# Patient Record
Sex: MALE | Race: WHITE | NOT HISPANIC OR LATINO | URBAN - METROPOLITAN AREA
[De-identification: names, ages, dates, MRNs, and addresses within clinical notes are randomized per-mention and may not be internally consistent; named-entity substitution may affect disease eponyms.]

---

## 2017-04-06 PROBLEM — Z00.00 ENCOUNTER FOR PREVENTIVE HEALTH EXAMINATION: Status: ACTIVE | Noted: 2017-04-06

## 2017-05-16 ENCOUNTER — EMERGENCY (EMERGENCY)
Facility: HOSPITAL | Age: 52
LOS: 0 days | Discharge: HOME | End: 2017-05-16
Attending: EMERGENCY MEDICINE | Admitting: EMERGENCY MEDICINE
Payer: MEDICARE

## 2017-05-16 VITALS
HEART RATE: 74 BPM | TEMPERATURE: 99 F | OXYGEN SATURATION: 100 % | DIASTOLIC BLOOD PRESSURE: 90 MMHG | RESPIRATION RATE: 18 BRPM | SYSTOLIC BLOOD PRESSURE: 130 MMHG

## 2017-05-16 VITALS — WEIGHT: 160.06 LBS | HEIGHT: 63 IN

## 2017-05-16 DIAGNOSIS — R22.1 LOCALIZED SWELLING, MASS AND LUMP, NECK: ICD-10-CM

## 2017-05-16 DIAGNOSIS — Q82.2 CONGENITAL CUTANEOUS MASTOCYTOSIS: ICD-10-CM

## 2017-05-16 DIAGNOSIS — R06.02 SHORTNESS OF BREATH: ICD-10-CM

## 2017-05-16 LAB
ALBUMIN SERPL ELPH-MCNC: 4.4 G/DL — SIGNIFICANT CHANGE UP (ref 3.3–5)
ALP SERPL-CCNC: 68 U/L — SIGNIFICANT CHANGE UP (ref 40–120)
ALT FLD-CCNC: 26 U/L — SIGNIFICANT CHANGE UP (ref 12–78)
ANION GAP SERPL CALC-SCNC: 9 MMOL/L — SIGNIFICANT CHANGE UP (ref 5–17)
APPEARANCE UR: CLEAR — SIGNIFICANT CHANGE UP
AST SERPL-CCNC: 22 U/L — SIGNIFICANT CHANGE UP (ref 15–37)
BACTERIA # UR AUTO: (no result)
BASOPHILS # BLD AUTO: 0.2 K/UL — SIGNIFICANT CHANGE UP (ref 0–0.2)
BASOPHILS NFR BLD AUTO: 1.7 % — SIGNIFICANT CHANGE UP (ref 0–2)
BILIRUB SERPL-MCNC: 0.3 MG/DL — SIGNIFICANT CHANGE UP (ref 0.2–1.2)
BILIRUB UR-MCNC: NEGATIVE — SIGNIFICANT CHANGE UP
BUN SERPL-MCNC: 11 MG/DL — SIGNIFICANT CHANGE UP (ref 7–23)
CALCIUM SERPL-MCNC: 9.3 MG/DL — SIGNIFICANT CHANGE UP (ref 8.5–10.1)
CHLORIDE SERPL-SCNC: 105 MMOL/L — SIGNIFICANT CHANGE UP (ref 96–108)
CO2 SERPL-SCNC: 26 MMOL/L — SIGNIFICANT CHANGE UP (ref 22–31)
COLOR SPEC: YELLOW — SIGNIFICANT CHANGE UP
CREAT SERPL-MCNC: 0.9 MG/DL — SIGNIFICANT CHANGE UP (ref 0.5–1.3)
DIFF PNL FLD: (no result)
EOSINOPHIL # BLD AUTO: 0.3 K/UL — SIGNIFICANT CHANGE UP (ref 0–0.5)
EOSINOPHIL NFR BLD AUTO: 3.2 % — SIGNIFICANT CHANGE UP (ref 0–6)
GLUCOSE SERPL-MCNC: 79 MG/DL — SIGNIFICANT CHANGE UP (ref 70–99)
GLUCOSE UR QL: NEGATIVE MG/DL — SIGNIFICANT CHANGE UP
HCT VFR BLD CALC: 41.3 % — SIGNIFICANT CHANGE UP (ref 39–50)
HGB BLD-MCNC: 14.1 G/DL — SIGNIFICANT CHANGE UP (ref 13–17)
KETONES UR-MCNC: NEGATIVE — SIGNIFICANT CHANGE UP
LEUKOCYTE ESTERASE UR-ACNC: (no result)
LYMPHOCYTES # BLD AUTO: 1.3 K/UL — SIGNIFICANT CHANGE UP (ref 1–3.3)
LYMPHOCYTES # BLD AUTO: 14 % — SIGNIFICANT CHANGE UP (ref 13–44)
MCHC RBC-ENTMCNC: 27.8 PG — SIGNIFICANT CHANGE UP (ref 27–34)
MCHC RBC-ENTMCNC: 34.1 GM/DL — SIGNIFICANT CHANGE UP (ref 32–36)
MCV RBC AUTO: 81.6 FL — SIGNIFICANT CHANGE UP (ref 80–100)
MONOCYTES # BLD AUTO: 0.6 K/UL — SIGNIFICANT CHANGE UP (ref 0–0.9)
MONOCYTES NFR BLD AUTO: 6.5 % — SIGNIFICANT CHANGE UP (ref 2–14)
NEUTROPHILS # BLD AUTO: 6.8 K/UL — SIGNIFICANT CHANGE UP (ref 1.8–7.4)
NEUTROPHILS NFR BLD AUTO: 74.6 % — SIGNIFICANT CHANGE UP (ref 43–77)
NITRITE UR-MCNC: NEGATIVE — SIGNIFICANT CHANGE UP
NT-PROBNP SERPL-SCNC: 35 PG/ML — SIGNIFICANT CHANGE UP (ref 0–125)
PH UR: 7 — SIGNIFICANT CHANGE UP (ref 5–8)
PLATELET # BLD AUTO: 284 K/UL — SIGNIFICANT CHANGE UP (ref 150–400)
POTASSIUM SERPL-MCNC: 4.2 MMOL/L — SIGNIFICANT CHANGE UP (ref 3.5–5.3)
POTASSIUM SERPL-SCNC: 4.2 MMOL/L — SIGNIFICANT CHANGE UP (ref 3.5–5.3)
PROT SERPL-MCNC: 7.8 GM/DL — SIGNIFICANT CHANGE UP (ref 6–8.3)
PROT UR-MCNC: NEGATIVE MG/DL — SIGNIFICANT CHANGE UP
RBC # BLD: 5.07 M/UL — SIGNIFICANT CHANGE UP (ref 4.2–5.8)
RBC # FLD: 11.8 % — SIGNIFICANT CHANGE UP (ref 10.3–14.5)
RBC CASTS # UR COMP ASSIST: (no result) /HPF (ref 0–4)
SODIUM SERPL-SCNC: 140 MMOL/L — SIGNIFICANT CHANGE UP (ref 135–145)
SP GR SPEC: 1.01 — SIGNIFICANT CHANGE UP (ref 1.01–1.02)
UROBILINOGEN FLD QL: NEGATIVE MG/DL — SIGNIFICANT CHANGE UP
WBC # BLD: 9.1 K/UL — SIGNIFICANT CHANGE UP (ref 3.8–10.5)
WBC # FLD AUTO: 9.1 K/UL — SIGNIFICANT CHANGE UP (ref 3.8–10.5)
WBC UR QL: SIGNIFICANT CHANGE UP

## 2017-05-16 PROCEDURE — 93010 ELECTROCARDIOGRAM REPORT: CPT

## 2017-05-16 PROCEDURE — 99284 EMERGENCY DEPT VISIT MOD MDM: CPT

## 2017-05-16 RX ORDER — FAMOTIDINE 10 MG/ML
1 INJECTION INTRAVENOUS
Qty: 10 | Refills: 0
Start: 2017-05-16 | End: 2017-05-21

## 2017-05-16 RX ORDER — FAMOTIDINE 10 MG/ML
20 INJECTION INTRAVENOUS ONCE
Qty: 0 | Refills: 0 | Status: COMPLETED | OUTPATIENT
Start: 2017-05-16 | End: 2017-05-16

## 2017-05-16 RX ORDER — DIPHENHYDRAMINE HCL 50 MG
1 CAPSULE ORAL
Qty: 21 | Refills: 0
Start: 2017-05-16 | End: 2017-05-23

## 2017-05-16 RX ORDER — DIPHENHYDRAMINE HCL 50 MG
50 CAPSULE ORAL ONCE
Qty: 0 | Refills: 0 | Status: COMPLETED | OUTPATIENT
Start: 2017-05-16 | End: 2017-05-16

## 2017-05-16 RX ADMIN — FAMOTIDINE 20 MILLIGRAM(S): 10 INJECTION INTRAVENOUS at 17:23

## 2017-05-16 RX ADMIN — Medication 50 MILLIGRAM(S): at 17:02

## 2017-05-16 RX ADMIN — Medication 125 MILLIGRAM(S): at 17:02

## 2017-05-16 NOTE — ED STATDOCS - CARE PLAN
Principal Discharge DX:	Neck swelling  Secondary Diagnosis:	Shortness of breath  Secondary Diagnosis:	Mastocytosis

## 2017-05-16 NOTE — ED STATDOCS - OBJECTIVE STATEMENT
50 yo M h/o mastocytosis presents for SOB x 2 days. Pt states his throat is closing. +Chest congestion, coughing up fluid in his lungs. Afebrile. He went to urgent care today and was prescribed a medication that didn't help. Pt uses epipen injections for emergencies, last used 1.5 years ago. PCP Dr. Pennington. No recent travel.

## 2017-05-16 NOTE — ED STATDOCS - ATTENDING CONTRIBUTION TO CARE
I, Jose Eastman MD,  performed the initial face to face bedside interview with this patient regarding history of present illness, review of symptoms and relevant past medical, social and family history.  I completed an independent physical examination.  I was the initial provider who evaluated this patient. I have signed out the follow up of any pending tests (i.e. labs, radiological studies) to the ACP.  I have communicated the patient’s plan of care and disposition with the ACP.  The history, relevant review of systems, past medical and surgical history, medical decision making, and physical examination was documented by the scribe in my presence and I attest to the accuracy of the documentation.

## 2017-05-16 NOTE — ED STATDOCS - PROGRESS NOTE DETAILS
52 yo male with a PMH of mastocytosis presents with sob x 2 days. States he gets this intermittent episodes that causes him to have fluid buildup in the lungs, coughing up fluid and swelling of his neck but this time it lasted longer than normal. Took benadryl and claritan without relief. Denies cp, aback pain, n/v/d, f/c/sweating. Will give meds, IVF and reeval -Noble moreno PA-C Reevaluated patient at bedside.  Patient feeling much improved and swelling has improved.  Discussed the results in ED and copies of all reports given.   An opportunity to ask questions was given.  Discussed the importance of prompt, close medical follow-up.  Patient will return with any changes, concerns or persistent / worsening symptoms.  Understanding of all instructions verbalized. Reevaluated patient at bedside.  Patient feeling much improved and swelling has improved. Wants to go home. Resting comfortably on RW chairs. Discussed the results in ED and copies of all reports given.   An opportunity to ask questions was given.  Discussed the importance of prompt, close medical follow-up.  Patient will return with any changes, concerns or persistent / worsening symptoms.  Understanding of all instructions verbalized.

## 2017-05-17 LAB
CULTURE RESULTS: NO GROWTH — SIGNIFICANT CHANGE UP
SPECIMEN SOURCE: SIGNIFICANT CHANGE UP

## 2017-10-28 ENCOUNTER — EMERGENCY (EMERGENCY)
Facility: HOSPITAL | Age: 52
LOS: 0 days | Discharge: HOME | End: 2017-10-28
Attending: EMERGENCY MEDICINE | Admitting: EMERGENCY MEDICINE
Payer: MEDICARE

## 2017-10-28 VITALS
SYSTOLIC BLOOD PRESSURE: 131 MMHG | DIASTOLIC BLOOD PRESSURE: 75 MMHG | HEART RATE: 84 BPM | TEMPERATURE: 98 F | OXYGEN SATURATION: 100 % | RESPIRATION RATE: 17 BRPM

## 2017-10-28 VITALS — WEIGHT: 179.9 LBS | HEIGHT: 65 IN

## 2017-10-28 DIAGNOSIS — R22.0 LOCALIZED SWELLING, MASS AND LUMP, HEAD: ICD-10-CM
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
## 2017-10-28 DIAGNOSIS — T78.3XXA ANGIONEUROTIC EDEMA, INITIAL ENCOUNTER: ICD-10-CM
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
## 2017-10-28 PROCEDURE — 93010 ELECTROCARDIOGRAM REPORT: CPT

## 2017-10-28 PROCEDURE — 99285 EMERGENCY DEPT VISIT HI MDM: CPT

## 2017-10-28 RX ORDER — SODIUM CHLORIDE 9 MG/ML
1000 INJECTION INTRAMUSCULAR; INTRAVENOUS; SUBCUTANEOUS ONCE
Qty: 0 | Refills: 0 | Status: COMPLETED | OUTPATIENT
Start: 2017-10-28 | End: 2017-10-28

## 2017-10-28 RX ORDER — FAMOTIDINE 10 MG/ML
20 INJECTION INTRAVENOUS ONCE
Qty: 0 | Refills: 0 | Status: COMPLETED | OUTPATIENT
Start: 2017-10-28 | End: 2017-10-28

## 2017-10-28 RX ORDER — ACETAMINOPHEN 500 MG
650 TABLET ORAL ONCE
Qty: 0 | Refills: 0 | Status: COMPLETED | OUTPATIENT
Start: 2017-10-28 | End: 2017-10-28

## 2017-10-28 RX ORDER — HYDROXYZINE HCL 10 MG
1 TABLET ORAL
Qty: 9 | Refills: 0
Start: 2017-10-28 | End: 2017-10-31

## 2017-10-28 RX ORDER — EPINEPHRINE 0.3 MG/.3ML
0.3 INJECTION INTRAMUSCULAR; SUBCUTANEOUS ONCE
Qty: 0 | Refills: 0 | Status: COMPLETED | OUTPATIENT
Start: 2017-10-28 | End: 2017-10-28

## 2017-10-28 RX ADMIN — Medication 125 MILLIGRAM(S): at 17:32

## 2017-10-28 RX ADMIN — FAMOTIDINE 20 MILLIGRAM(S): 10 INJECTION INTRAVENOUS at 17:32

## 2017-10-28 RX ADMIN — SODIUM CHLORIDE 1000 MILLILITER(S): 9 INJECTION INTRAMUSCULAR; INTRAVENOUS; SUBCUTANEOUS at 17:30

## 2017-10-28 RX ADMIN — EPINEPHRINE 0.3 MILLIGRAM(S): 0.3 INJECTION INTRAMUSCULAR; SUBCUTANEOUS at 17:32

## 2017-10-28 RX ADMIN — Medication 650 MILLIGRAM(S): at 18:51

## 2017-10-28 NOTE — ED PROVIDER NOTE - PROGRESS NOTE DETAILS
Reassessment shows resolution of post laryngeal swelling will send on steroids and Atarax follow up with his PCP. Reassessment shows resolution of post laryngeal swelling will send on steroids and Atarax follow up with his PCP.  Pt reports no further symptoms.

## 2017-10-28 NOTE — ED PROVIDER NOTE - MUSCULOSKELETAL, MLM
Spine appears normal, range of motion is not limited, no muscle or joint tenderness +left posterior pharynx swelling Spine appears normal, range of motion is not limited, no muscle or joint tenderness

## 2017-10-28 NOTE — ED PROVIDER NOTE - OBJECTIVE STATEMENT
53 y/o male hx of mastocytosis, allergy to horse hair presents to the ED c/o sudden onset of left posterior pharynx swelling about half hour ago, took ibuprofen, took Benadryl. Pt had cheese and lemonade before coming to the ED.    Xanax yesterday-anxiety   nonsmoker.   Denies any new medications. Does not see an Allergist.

## 2017-10-28 NOTE — ED ADULT NURSE REASSESSMENT NOTE - NS ED NURSE REASSESS COMMENT FT1
Care transferred from Mignon Escudero RN to myself. Pt resting comfortable, airway patent, normal breathing pattern with no difficulty. Skin is smooth, dry and intact. Pt states "my throat feels scratchy", speech is clear. AO x 3 oriented to baseline.  at the bedside, DC pending

## 2017-10-28 NOTE — ED PROVIDER NOTE - ENMT, MLM
Airway patent, Nasal mucosa clear. Mouth with normal mucosa. Throat has no vesicles, no oropharyngeal exudates and uvula is midline. Airway patent, Nasal mucosa clear. Mouth with normal mucosa. Throat has no vesicles, no oropharyngeal exudates and uvula is midline.  + left posterior pharyngeal swelling. No drooling or stridor. No tongue or lip swelling.

## 2017-10-28 NOTE — ED PROVIDER NOTE - NS ED SCRIBE STATEMENT
The writer sent a web page information to the [facility] re new sanguineous drain from pharyngostomy to low intermittent suctioning. The patient is refusing irrigation. The patient is concerned and has turned off the intermittent suctioning. The MD will see the patient.    Attending

## 2018-05-18 NOTE — ED STATDOCS - NS ED MD SCRIBE ATTENDING SCRIBE SECTIONS
17-May-2018 23:33
RESULTS/HISTORY OF PRESENT ILLNESS/PAST MEDICAL/SURGICAL/SOCIAL HISTORY/REVIEW OF SYSTEMS/PROGRESS NOTE/PHYSICAL EXAM/DISPOSITION

## 2019-10-12 ENCOUNTER — EMERGENCY (EMERGENCY)
Facility: HOSPITAL | Age: 54
LOS: 0 days | Discharge: HOME | End: 2019-10-12
Attending: EMERGENCY MEDICINE
Payer: MEDICARE

## 2019-10-12 VITALS
OXYGEN SATURATION: 100 % | SYSTOLIC BLOOD PRESSURE: 139 MMHG | RESPIRATION RATE: 18 BRPM | DIASTOLIC BLOOD PRESSURE: 91 MMHG | TEMPERATURE: 98 F | HEART RATE: 82 BPM

## 2019-10-12 VITALS — HEIGHT: 65 IN | WEIGHT: 169.98 LBS

## 2019-10-12 DIAGNOSIS — L03.116 CELLULITIS OF LEFT LOWER LIMB: ICD-10-CM

## 2019-10-12 DIAGNOSIS — L73.9 FOLLICULAR DISORDER, UNSPECIFIED: ICD-10-CM

## 2019-10-12 PROCEDURE — 99283 EMERGENCY DEPT VISIT LOW MDM: CPT

## 2019-10-12 PROCEDURE — 96372 THER/PROPH/DIAG INJ SC/IM: CPT

## 2019-10-12 PROCEDURE — 99283 EMERGENCY DEPT VISIT LOW MDM: CPT | Mod: 25

## 2019-10-12 RX ORDER — CEFTRIAXONE 500 MG/1
250 INJECTION, POWDER, FOR SOLUTION INTRAMUSCULAR; INTRAVENOUS ONCE
Refills: 0 | Status: COMPLETED | OUTPATIENT
Start: 2019-10-12 | End: 2019-10-12

## 2019-10-12 RX ADMIN — CEFTRIAXONE 250 MILLIGRAM(S): 500 INJECTION, POWDER, FOR SOLUTION INTRAMUSCULAR; INTRAVENOUS at 20:08

## 2019-10-12 NOTE — ED ADULT TRIAGE NOTE - CHIEF COMPLAINT QUOTE
Infection in left knee for 3 days. Went to urgent care and was given antibiotic with no improvement. symptoms worsened. patient was given a different antibiotic and had adverse reaction. swelling has worsened. unsure of the source of infection.

## 2019-10-12 NOTE — ED ADULT NURSE NOTE - NSIMPLEMENTINTERV_GEN_ALL_ED
Implemented All Universal Safety Interventions:  [place] to call system. Call bell, personal items and telephone within reach. Instruct patient to call for assistance. Room bathroom lighting operational. Non-slip footwear when patient is off stretcher. Physically safe environment: no spills, clutter or unnecessary equipment. Stretcher in lowest position, wheels locked, appropriate side rails in place.

## 2019-10-12 NOTE — ED STATDOCS - SKIN, MLM
skin normal color for race, warm, dry and intact. skin normal color for race, warm, dry and intact. left knee over tibial tubercle with tiny pustule without significant surrounding erythema, no erudition, no fluctuance, no streaking

## 2019-10-12 NOTE — ED STATDOCS - CLINICAL SUMMARY MEDICAL DECISION MAKING FREE TEXT BOX
PT with mild LE pustule, superficial infection, threw up doxy given by .  worried infection is spreading, however mild here to me today.  pt and family requesting "injection of antibiotics" to ensure it does not get worse.  will give ceftriaxone 250mg IM and should cont doxy and follow up with PMD.  return precautions discussed at length.

## 2019-10-12 NOTE — ED STATDOCS - PATIENT PORTAL LINK FT
You can access the FollowMyHealth Patient Portal offered by [facility] by registering at the following website: [email or web address]. By joining [facility]’s FollowMyHealth portal, you will also be able to view your health information using other applications (apps) compatible with our system.

## 2019-10-12 NOTE — ED STATDOCS - CARE PLAN
Principal Discharge DX:	Folliculitis Principal Discharge DX:	Folliculitis  Secondary Diagnosis:	Cellulitis of left lower extremity

## 2019-10-12 NOTE — ED ADULT NURSE NOTE - OBJECTIVE STATEMENT
53 y/o M presents c/o left knee pain. Pt treated outpatient with antibiotics. Pt returned for increased discomfort.

## 2019-10-13 PROBLEM — Q82.2 CONGENITAL CUTANEOUS MASTOCYTOSIS: Chronic | Status: ACTIVE | Noted: 2017-05-17

## 2021-09-18 ENCOUNTER — INPATIENT (INPATIENT)
Facility: HOSPITAL | Age: 56
LOS: 12 days | Discharge: HOME | DRG: 280 | End: 2021-10-01
Attending: FAMILY MEDICINE | Admitting: STUDENT IN AN ORGANIZED HEALTH CARE EDUCATION/TRAINING PROGRAM
Payer: MEDICARE

## 2021-09-18 VITALS
WEIGHT: 160.06 LBS | DIASTOLIC BLOOD PRESSURE: 48 MMHG | TEMPERATURE: 98 F | RESPIRATION RATE: 17 BRPM | HEIGHT: 65 IN | OXYGEN SATURATION: 97 % | HEART RATE: 61 BPM | SYSTOLIC BLOOD PRESSURE: 109 MMHG

## 2021-09-18 DIAGNOSIS — K85.20 ALCOHOL INDUCED ACUTE PANCREATITIS WITHOUT NECROSIS OR INFECTION: ICD-10-CM

## 2021-09-18 LAB
ADD ON TEST-SPECIMEN IN LAB: SIGNIFICANT CHANGE UP
ADD ON TEST-SPECIMEN IN LAB: SIGNIFICANT CHANGE UP
ALBUMIN SERPL ELPH-MCNC: 3.7 G/DL — SIGNIFICANT CHANGE UP (ref 3.3–5)
ALP SERPL-CCNC: 104 U/L — SIGNIFICANT CHANGE UP (ref 40–120)
ALT FLD-CCNC: 42 U/L — SIGNIFICANT CHANGE UP (ref 12–78)
ANION GAP SERPL CALC-SCNC: 10 MMOL/L — SIGNIFICANT CHANGE UP (ref 5–17)
APTT BLD: 29.1 SEC — SIGNIFICANT CHANGE UP (ref 27.5–35.5)
AST SERPL-CCNC: 60 U/L — HIGH (ref 15–37)
BASOPHILS # BLD AUTO: 0.02 K/UL — SIGNIFICANT CHANGE UP (ref 0–0.2)
BASOPHILS NFR BLD AUTO: 0.2 % — SIGNIFICANT CHANGE UP (ref 0–2)
BILIRUB SERPL-MCNC: 0.5 MG/DL — SIGNIFICANT CHANGE UP (ref 0.2–1.2)
BUN SERPL-MCNC: 8 MG/DL — SIGNIFICANT CHANGE UP (ref 7–23)
CALCIUM SERPL-MCNC: 8.8 MG/DL — SIGNIFICANT CHANGE UP (ref 8.5–10.1)
CHLORIDE SERPL-SCNC: 97 MMOL/L — SIGNIFICANT CHANGE UP (ref 96–108)
CO2 SERPL-SCNC: 24 MMOL/L — SIGNIFICANT CHANGE UP (ref 22–31)
CREAT SERPL-MCNC: 0.74 MG/DL — SIGNIFICANT CHANGE UP (ref 0.5–1.3)
EOSINOPHIL # BLD AUTO: 0 K/UL — SIGNIFICANT CHANGE UP (ref 0–0.5)
EOSINOPHIL NFR BLD AUTO: 0 % — SIGNIFICANT CHANGE UP (ref 0–6)
ETHANOL SERPL-MCNC: <10 MG/DL — SIGNIFICANT CHANGE UP (ref 0–10)
GLUCOSE SERPL-MCNC: 111 MG/DL — HIGH (ref 70–99)
HCT VFR BLD CALC: 40.2 % — SIGNIFICANT CHANGE UP (ref 39–50)
HGB BLD-MCNC: 13.5 G/DL — SIGNIFICANT CHANGE UP (ref 13–17)
IMM GRANULOCYTES NFR BLD AUTO: 0.5 % — SIGNIFICANT CHANGE UP (ref 0–1.5)
INR BLD: 1.14 RATIO — SIGNIFICANT CHANGE UP (ref 0.88–1.16)
LIDOCAIN IGE QN: 3319 U/L — HIGH (ref 73–393)
LYMPHOCYTES # BLD AUTO: 0.45 K/UL — LOW (ref 1–3.3)
LYMPHOCYTES # BLD AUTO: 5.4 % — LOW (ref 13–44)
MCHC RBC-ENTMCNC: 27.3 PG — SIGNIFICANT CHANGE UP (ref 27–34)
MCHC RBC-ENTMCNC: 33.6 GM/DL — SIGNIFICANT CHANGE UP (ref 32–36)
MCV RBC AUTO: 81.2 FL — SIGNIFICANT CHANGE UP (ref 80–100)
MONOCYTES # BLD AUTO: 0.36 K/UL — SIGNIFICANT CHANGE UP (ref 0–0.9)
MONOCYTES NFR BLD AUTO: 4.3 % — SIGNIFICANT CHANGE UP (ref 2–14)
NEUTROPHILS # BLD AUTO: 7.48 K/UL — HIGH (ref 1.8–7.4)
NEUTROPHILS NFR BLD AUTO: 89.6 % — HIGH (ref 43–77)
PLATELET # BLD AUTO: 214 K/UL — SIGNIFICANT CHANGE UP (ref 150–400)
POTASSIUM SERPL-MCNC: 4.3 MMOL/L — SIGNIFICANT CHANGE UP (ref 3.5–5.3)
POTASSIUM SERPL-SCNC: 4.3 MMOL/L — SIGNIFICANT CHANGE UP (ref 3.5–5.3)
PROT SERPL-MCNC: 7.8 GM/DL — SIGNIFICANT CHANGE UP (ref 6–8.3)
PROTHROM AB SERPL-ACNC: 13.2 SEC — SIGNIFICANT CHANGE UP (ref 10.6–13.6)
RBC # BLD: 4.95 M/UL — SIGNIFICANT CHANGE UP (ref 4.2–5.8)
RBC # FLD: 15.3 % — HIGH (ref 10.3–14.5)
SARS-COV-2 RNA SPEC QL NAA+PROBE: SIGNIFICANT CHANGE UP
SODIUM SERPL-SCNC: 131 MMOL/L — LOW (ref 135–145)
TROPONIN I SERPL-MCNC: 15.4 NG/ML — HIGH (ref 0.01–0.04)
TROPONIN I SERPL-MCNC: 4.61 NG/ML — HIGH (ref 0.01–0.04)
WBC # BLD: 8.35 K/UL — SIGNIFICANT CHANGE UP (ref 3.8–10.5)
WBC # FLD AUTO: 8.35 K/UL — SIGNIFICANT CHANGE UP (ref 3.8–10.5)

## 2021-09-18 PROCEDURE — 85025 COMPLETE CBC W/AUTO DIFF WBC: CPT

## 2021-09-18 PROCEDURE — 93005 ELECTROCARDIOGRAM TRACING: CPT

## 2021-09-18 PROCEDURE — 80076 HEPATIC FUNCTION PANEL: CPT

## 2021-09-18 PROCEDURE — 86022 PLATELET ANTIBODIES: CPT

## 2021-09-18 PROCEDURE — 86769 SARS-COV-2 COVID-19 ANTIBODY: CPT

## 2021-09-18 PROCEDURE — 85027 COMPLETE CBC AUTOMATED: CPT

## 2021-09-18 PROCEDURE — 80307 DRUG TEST PRSMV CHEM ANLYZR: CPT

## 2021-09-18 PROCEDURE — U0005: CPT

## 2021-09-18 PROCEDURE — 87086 URINE CULTURE/COLONY COUNT: CPT

## 2021-09-18 PROCEDURE — 85730 THROMBOPLASTIN TIME PARTIAL: CPT

## 2021-09-18 PROCEDURE — C1887: CPT

## 2021-09-18 PROCEDURE — 81001 URINALYSIS AUTO W/SCOPE: CPT

## 2021-09-18 PROCEDURE — 82962 GLUCOSE BLOOD TEST: CPT

## 2021-09-18 PROCEDURE — 97162 PT EVAL MOD COMPLEX 30 MIN: CPT | Mod: GP

## 2021-09-18 PROCEDURE — 93306 TTE W/DOPPLER COMPLETE: CPT

## 2021-09-18 PROCEDURE — 84484 ASSAY OF TROPONIN QUANT: CPT

## 2021-09-18 PROCEDURE — 36415 COLL VENOUS BLD VENIPUNCTURE: CPT

## 2021-09-18 PROCEDURE — 84100 ASSAY OF PHOSPHORUS: CPT

## 2021-09-18 PROCEDURE — 97116 GAIT TRAINING THERAPY: CPT | Mod: GP

## 2021-09-18 PROCEDURE — 74177 CT ABD & PELVIS W/CONTRAST: CPT

## 2021-09-18 PROCEDURE — 74177 CT ABD & PELVIS W/CONTRAST: CPT | Mod: 26,ME

## 2021-09-18 PROCEDURE — 99291 CRITICAL CARE FIRST HOUR: CPT

## 2021-09-18 PROCEDURE — 99223 1ST HOSP IP/OBS HIGH 75: CPT

## 2021-09-18 PROCEDURE — 86803 HEPATITIS C AB TEST: CPT

## 2021-09-18 PROCEDURE — G1004: CPT

## 2021-09-18 PROCEDURE — 93010 ELECTROCARDIOGRAM REPORT: CPT

## 2021-09-18 PROCEDURE — U0003: CPT

## 2021-09-18 PROCEDURE — 83735 ASSAY OF MAGNESIUM: CPT

## 2021-09-18 PROCEDURE — 83690 ASSAY OF LIPASE: CPT

## 2021-09-18 PROCEDURE — 80053 COMPREHEN METABOLIC PANEL: CPT

## 2021-09-18 PROCEDURE — 83036 HEMOGLOBIN GLYCOSYLATED A1C: CPT

## 2021-09-18 PROCEDURE — 84443 ASSAY THYROID STIM HORMONE: CPT

## 2021-09-18 PROCEDURE — 80048 BASIC METABOLIC PNL TOTAL CA: CPT

## 2021-09-18 PROCEDURE — 80061 LIPID PANEL: CPT

## 2021-09-18 PROCEDURE — C1894: CPT

## 2021-09-18 PROCEDURE — 71045 X-RAY EXAM CHEST 1 VIEW: CPT | Mod: 26

## 2021-09-18 PROCEDURE — C1769: CPT

## 2021-09-18 PROCEDURE — 85049 AUTOMATED PLATELET COUNT: CPT

## 2021-09-18 PROCEDURE — 97530 THERAPEUTIC ACTIVITIES: CPT | Mod: GP

## 2021-09-18 PROCEDURE — 93454 CORONARY ARTERY ANGIO S&I: CPT

## 2021-09-18 RX ORDER — CLOPIDOGREL BISULFATE 75 MG/1
75 TABLET, FILM COATED ORAL ONCE
Refills: 0 | Status: COMPLETED | OUTPATIENT
Start: 2021-09-18 | End: 2021-09-18

## 2021-09-18 RX ORDER — CLOPIDOGREL BISULFATE 75 MG/1
300 TABLET, FILM COATED ORAL ONCE
Refills: 0 | Status: COMPLETED | OUTPATIENT
Start: 2021-09-18 | End: 2021-09-18

## 2021-09-18 RX ORDER — HEPARIN SODIUM 5000 [USP'U]/ML
INJECTION INTRAVENOUS; SUBCUTANEOUS
Qty: 25000 | Refills: 0 | Status: DISCONTINUED | OUTPATIENT
Start: 2021-09-18 | End: 2021-09-20

## 2021-09-18 RX ORDER — ASPIRIN/CALCIUM CARB/MAGNESIUM 324 MG
324 TABLET ORAL ONCE
Refills: 0 | Status: COMPLETED | OUTPATIENT
Start: 2021-09-18 | End: 2021-09-18

## 2021-09-18 RX ORDER — LISINOPRIL 2.5 MG/1
5 TABLET ORAL DAILY
Refills: 0 | Status: DISCONTINUED | OUTPATIENT
Start: 2021-09-18 | End: 2021-09-20

## 2021-09-18 RX ORDER — HEPARIN SODIUM 5000 [USP'U]/ML
4100 INJECTION INTRAVENOUS; SUBCUTANEOUS ONCE
Refills: 0 | Status: COMPLETED | OUTPATIENT
Start: 2021-09-18 | End: 2021-09-18

## 2021-09-18 RX ORDER — ATORVASTATIN CALCIUM 80 MG/1
80 TABLET, FILM COATED ORAL AT BEDTIME
Refills: 0 | Status: DISCONTINUED | OUTPATIENT
Start: 2021-09-18 | End: 2021-10-01

## 2021-09-18 RX ORDER — MORPHINE SULFATE 50 MG/1
4 CAPSULE, EXTENDED RELEASE ORAL EVERY 4 HOURS
Refills: 0 | Status: DISCONTINUED | OUTPATIENT
Start: 2021-09-18 | End: 2021-09-25

## 2021-09-18 RX ORDER — ASPIRIN/CALCIUM CARB/MAGNESIUM 324 MG
81 TABLET ORAL DAILY
Refills: 0 | Status: DISCONTINUED | OUTPATIENT
Start: 2021-09-18 | End: 2021-10-01

## 2021-09-18 RX ORDER — METOPROLOL TARTRATE 50 MG
25 TABLET ORAL
Refills: 0 | Status: DISCONTINUED | OUTPATIENT
Start: 2021-09-18 | End: 2021-09-19

## 2021-09-18 RX ORDER — FAMOTIDINE 10 MG/ML
20 INJECTION INTRAVENOUS ONCE
Refills: 0 | Status: COMPLETED | OUTPATIENT
Start: 2021-09-18 | End: 2021-09-18

## 2021-09-18 RX ORDER — CLOPIDOGREL BISULFATE 75 MG/1
75 TABLET, FILM COATED ORAL DAILY
Refills: 0 | Status: DISCONTINUED | OUTPATIENT
Start: 2021-09-19 | End: 2021-09-19

## 2021-09-18 RX ORDER — ACETAMINOPHEN 500 MG
650 TABLET ORAL EVERY 6 HOURS
Refills: 0 | Status: DISCONTINUED | OUTPATIENT
Start: 2021-09-18 | End: 2021-10-01

## 2021-09-18 RX ORDER — SODIUM CHLORIDE 9 MG/ML
1000 INJECTION INTRAMUSCULAR; INTRAVENOUS; SUBCUTANEOUS ONCE
Refills: 0 | Status: COMPLETED | OUTPATIENT
Start: 2021-09-18 | End: 2021-09-18

## 2021-09-18 RX ORDER — ONDANSETRON 8 MG/1
4 TABLET, FILM COATED ORAL ONCE
Refills: 0 | Status: COMPLETED | OUTPATIENT
Start: 2021-09-18 | End: 2021-09-18

## 2021-09-18 RX ORDER — HEPARIN SODIUM 5000 [USP'U]/ML
4100 INJECTION INTRAVENOUS; SUBCUTANEOUS EVERY 6 HOURS
Refills: 0 | Status: DISCONTINUED | OUTPATIENT
Start: 2021-09-18 | End: 2021-09-20

## 2021-09-18 RX ORDER — SODIUM CHLORIDE 9 MG/ML
1000 INJECTION INTRAMUSCULAR; INTRAVENOUS; SUBCUTANEOUS
Refills: 0 | Status: DISCONTINUED | OUTPATIENT
Start: 2021-09-18 | End: 2021-09-25

## 2021-09-18 RX ORDER — MORPHINE SULFATE 50 MG/1
4 CAPSULE, EXTENDED RELEASE ORAL ONCE
Refills: 0 | Status: DISCONTINUED | OUTPATIENT
Start: 2021-09-18 | End: 2021-09-18

## 2021-09-18 RX ORDER — FOLIC ACID 0.8 MG
1 TABLET ORAL DAILY
Refills: 0 | Status: DISCONTINUED | OUTPATIENT
Start: 2021-09-18 | End: 2021-10-01

## 2021-09-18 RX ORDER — ONDANSETRON 8 MG/1
4 TABLET, FILM COATED ORAL EVERY 6 HOURS
Refills: 0 | Status: DISCONTINUED | OUTPATIENT
Start: 2021-09-18 | End: 2021-10-01

## 2021-09-18 RX ORDER — AZELASTINE 137 UG/1
2 SPRAY, METERED NASAL
Qty: 0 | Refills: 0 | DISCHARGE

## 2021-09-18 RX ORDER — LEVOCETIRIZINE DIHYDROCHLORIDE 0.5 MG/ML
1 SOLUTION ORAL
Qty: 0 | Refills: 0 | DISCHARGE

## 2021-09-18 RX ORDER — THIAMINE MONONITRATE (VIT B1) 100 MG
100 TABLET ORAL DAILY
Refills: 0 | Status: COMPLETED | OUTPATIENT
Start: 2021-09-18 | End: 2021-09-21

## 2021-09-18 RX ADMIN — CLOPIDOGREL BISULFATE 75 MILLIGRAM(S): 75 TABLET, FILM COATED ORAL at 21:26

## 2021-09-18 RX ADMIN — MORPHINE SULFATE 4 MILLIGRAM(S): 50 CAPSULE, EXTENDED RELEASE ORAL at 18:46

## 2021-09-18 RX ADMIN — Medication 2 MILLIGRAM(S): at 21:24

## 2021-09-18 RX ADMIN — Medication 324 MILLIGRAM(S): at 21:24

## 2021-09-18 RX ADMIN — FAMOTIDINE 20 MILLIGRAM(S): 10 INJECTION INTRAVENOUS at 18:46

## 2021-09-18 RX ADMIN — ONDANSETRON 4 MILLIGRAM(S): 8 TABLET, FILM COATED ORAL at 18:46

## 2021-09-18 RX ADMIN — MORPHINE SULFATE 4 MILLIGRAM(S): 50 CAPSULE, EXTENDED RELEASE ORAL at 21:25

## 2021-09-18 RX ADMIN — SODIUM CHLORIDE 1000 MILLILITER(S): 9 INJECTION INTRAMUSCULAR; INTRAVENOUS; SUBCUTANEOUS at 18:46

## 2021-09-18 RX ADMIN — ONDANSETRON 4 MILLIGRAM(S): 8 TABLET, FILM COATED ORAL at 21:24

## 2021-09-18 RX ADMIN — HEPARIN SODIUM 800 UNIT(S)/HR: 5000 INJECTION INTRAVENOUS; SUBCUTANEOUS at 21:20

## 2021-09-18 RX ADMIN — HEPARIN SODIUM 4100 UNIT(S): 5000 INJECTION INTRAVENOUS; SUBCUTANEOUS at 21:23

## 2021-09-18 NOTE — ED PROVIDER NOTE - CARE PLAN
Principal Discharge DX:	Alcoholic pancreatitis  Secondary Diagnosis:	NSTEMI (non-ST elevation myocardial infarction)   1

## 2021-09-18 NOTE — ED ADULT NURSE NOTE - OBJECTIVE STATEMENT
Pt c/o ABD pain x3 days worsening in intensity. +NV denies diarrhea. Denies chest pain, SOB, fever/chills. HX Pancreatitis

## 2021-09-18 NOTE — ED PROVIDER NOTE - NS ED ROS FT
Review of Systems:  	•	CONSTITUTIONAL: no fever  	•	SKIN: no rash  	•	RESPIRATORY: no shortness of breath  	•	CARDIAC: no chest pain  	•	GI:  +abdominal pain, +nausea, +vomiting, +diarrhea  	•	GENITO-URINARY:  no dysuria  	•	MUSCULOSKELETAL:  no back pain  	•	NEUROLOGIC: no weakness  	•	ALLERGY: no rhinorrhea  	•	PSYSCHIATRIC: appropriate concern about symptoms

## 2021-09-18 NOTE — ED PROVIDER NOTE - NS ED MD EM SELECTION
CLINICAL PHARMACY NOTE: MEDS TO [address] Select Patient?: No  Total # of Prescriptions Filled: 2   The following medications were delivered to the patient:  · Abilify 10 mg   · Wellbutrin er xl 150 mg    Total # of Interventions Completed: 2  Time Spent (min): 30    Additional Documentation: 30677 Critical Care - 30 to 74 minutes

## 2021-09-18 NOTE — ED PROVIDER NOTE - PROGRESS NOTE DETAILS
Kiara Dean   Discussed with Dr. English Kiara LIRA for Dr. Dean   Patient's troponin is elevated, however pt without any chest pain. EKG without any acute ischemic changes. Will page cardiology: Dr. English as symptoms likely due to demand ischemia. Kiara Dean   Discussed with Dr. Lemon, who recommends starting pt on low-dose Plavix and Heparin drip. laisha: Discussed need to admit with patient & discussed risk and benefits.  Patient agreed to admission.  Discussed case w/ admitting doctor - agreed to admit to their service. will place bridge orders. Accepting physician said:   DO NOT MOVE prior to inpatient team evaluation Kiara LIRA for Dr. Dean   Patient's troponin is elevated, however pt without any chest pain. EKG without any acute ischemic changes. Will page cardiology: Dr. English as symptoms possibly due to demand ischemia vs alcohol cardiomyopathy. pt denies any cocaine use

## 2021-09-18 NOTE — ED PROVIDER NOTE - CLINICAL SUMMARY MEDICAL DECISION MAKING FREE TEXT BOX
Upper abdominal pian with nausea, vomiting. Likely due to alcoholic pancreatitis. Upper abdominal pain with nausea, vomiting. Likely due to alcoholic pancreatitis.

## 2021-09-18 NOTE — H&P ADULT - ASSESSMENT
A/P:    1.  NSTEMI  -no active chest pain now  -No ST elevation  -start on heparin drip  -started on Aspirin, Plavix, Lipitor, Lisinopril, Metoprolol  -follow troponin and follow clinically  -follow echo  -follow cardiology consult    2.  Acute Pancreatitis   -will keep on liquid diet  -on pain meds and nausea medication  -follow CMP  -follow lipid profile  -advised to quit drinking alcohol    3.  Alcohol dependence  Alcohol withdrawl  -keep on CIWA protocol  -Ativan taper  -on folic acid, MVI, Thiamine  -fall and seizure precaution    4.  Heparin for DVT ppx    5.  Code status: Patient is in full code status.  A/P:    1.  NSTEMI  -no active chest pain now  -No ST elevation  -start on heparin drip  -started on Aspirin, Plavix, Lipitor, Lisinopril  -Metoprolol will be started if the Urine dryg screen is Neg for Cocaine   -follow troponin and follow clinically  -follow echo  -follow cardiology consult    2.  Acute Pancreatitis   -will keep NPO  -on pain meds and nausea medication  -follow CMP  -follow lipid profile  -advised to quit drinking alcohol    3.  Alcohol dependence  Alcohol withdrawl  -keep on CIWA protocol  -Ativan taper  -on folic acid, MVI, Thiamine  -fall and seizure precaution    4.  Heparin for DVT ppx    5.  Code status: Patient is in full code status.

## 2021-09-18 NOTE — H&P ADULT - HISTORY OF PRESENT ILLNESS
54 yo Male presented with upper abdominal pain, gradually worsening over the past week. Patient states he has also has associated nausea, vomiting and occasional loose stools. Abdominal pain is mainly in the epigastric area, no radiation of the pain. States symptoms seem similar to prior alcoholic pancreatitis years ago. No past abdominal surgical Hx. He is a +Chronic Drinker, had 3-4 drinks today. Occasional+Marijuana use also. Denies any chest pain, sob, dizziness or headache.

## 2021-09-18 NOTE — H&P ADULT - NSHPPHYSICALEXAM_GEN_ALL_CORE
Vital Signs Last 24 Hrs  T(C): 36.7 (18 Sep 2021 21:15), Max: 36.7 (18 Sep 2021 17:53)  T(F): 98.1 (18 Sep 2021 21:15), Max: 98.1 (18 Sep 2021 17:53)  HR: 70 (18 Sep 2021 21:15) (61 - 70)  BP: 154/81 (18 Sep 2021 21:15) (109/48 - 154/81)  RR: 17 (18 Sep 2021 21:15) (17 - 17)  SpO2: 97% (18 Sep 2021 21:15) (97% - 97%)

## 2021-09-18 NOTE — ED PROVIDER NOTE - OBJECTIVE STATEMENT
Pertinent HPI/PMH/PSH/FHx/SHx and Review of Systems contained within  HPI:  Patient p/w CC abdominal pain, gradually worsening over the past week. Pt states he has also had nausea, vomiting and diarrhea. States he still feels nauseas now. Symptoms seem similar to pancreatitis from years ago. No past abdominal surgical Hx. +Smoker (cigars). +Chronic Drinker, had 2 drinks today. +Marijuana use. PCP: Dr. Iam Pennington  PMH/PSH relevant for: Mastocytosis, Pancreatitis, Depression on ant, opioid dependence on ?Suboxone  ROS negative for: fever, Chest pain, SOB, dysuria    FamilyHx and SocialHx not otherwise contributory Pertinent HPI/PMH/PSH/FHx/SHx and Review of Systems contained within  HPI:  Patient p/w CC upper abdominal pain, gradually worsening over the past week. Pt states he has also had nausea, vomiting and diarrhea. States he still feels nauseas now. States symptoms seem similar to pancreatitis from years ago. No past abdominal surgical Hx. +Smoker (cigars). +Chronic Drinker, had 2 drinks today. +Marijuana use. PCP: Dr. Iam Pennington  PMH/PSH relevant for: Mastocytosis, Pancreatitis, Depression on ant, opioid dependence on ?Suboxone  ROS negative for: fever, Chest pain, SOB, dysuria    FamilyHx and SocialHx not otherwise contributory Pertinent HPI/PMH/PSH/FHx/SHx and Review of Systems contained within  HPI: Patient p/w CC upper abdominal pain, gradually worsening over the past week. Pt states he has also has associated nausea, vomiting and diarrhea. Pt states he still feels nauseas now. States symptoms seem similar to prior alcoholic pancreatitis years ago. No past abdominal surgical Hx. +Smoker (cigars). +Chronic Drinker, had 2 drinks today. +Marijuana use. PCP: Dr. Iam Pennington. Denies Hx of cocaine use. Denies chest pain, SOB.  PMH/PSH relevant for: Mastocytosis, Pancreatitis, Depression on ant, opioid dependence on ?Suboxone. Endorses smoking marijuana a few days ago.  ROS negative for: fever, Chest pain, SOB, dysuria    FamilyHx and SocialHx not otherwise contributory

## 2021-09-18 NOTE — H&P ADULT - NSICDXFAMHXNEG_GEN_ALL
asthma/atrial fibrillation/brain aneurysm/congestive heart failure/COPD/coronary disease/diabetes/hypertension

## 2021-09-18 NOTE — ED PROVIDER NOTE - PHYSICAL EXAMINATION
*GEN: Appears intoxicated.  *HEAD: Normocephalic, Atraumatic  *EYES/NOSE: b/l Pupils symmetric & Reactive to ligth, EOMI b/l  *THROAT: airway patent, moist mucous membranes  *NECK: Neck supple  *PULMONARY: No Respiratory distress, symmetric b/l chest rise  *CARDIAC: s1s2, regular rhythm   *ABDOMEN: +Upper abdominal TTP, soft, no guarding, no rebound, no masses   *BACK: no CVA tenderness, No midline vertebral tenderness to palpation   *EXTREMITIES: symmetric pulses, 2+ DP & radial pulses, no cyanosis, no edema   *SKIN: no rash, no bruising   *NEUROLOGIC: alert,  full active & passive ROM in all 4 extremities,   *PSYCH: appropriate concern about symptoms, pleasant

## 2021-09-19 LAB
A1C WITH ESTIMATED AVERAGE GLUCOSE RESULT: 6.2 % — HIGH (ref 4–5.6)
ADD ON TEST-SPECIMEN IN LAB: SIGNIFICANT CHANGE UP
ADD ON TEST-SPECIMEN IN LAB: SIGNIFICANT CHANGE UP
ALBUMIN SERPL ELPH-MCNC: 3.2 G/DL — LOW (ref 3.3–5)
ALP SERPL-CCNC: 97 U/L — SIGNIFICANT CHANGE UP (ref 40–120)
ALT FLD-CCNC: 49 U/L — SIGNIFICANT CHANGE UP (ref 12–78)
ANION GAP SERPL CALC-SCNC: 10 MMOL/L — SIGNIFICANT CHANGE UP (ref 5–17)
APPEARANCE UR: CLEAR — SIGNIFICANT CHANGE UP
APTT BLD: 33.7 SEC — SIGNIFICANT CHANGE UP (ref 27.5–35.5)
APTT BLD: 52.7 SEC — HIGH (ref 27.5–35.5)
APTT BLD: 68.3 SEC — HIGH (ref 27.5–35.5)
AST SERPL-CCNC: 205 U/L — HIGH (ref 15–37)
BILIRUB SERPL-MCNC: 0.6 MG/DL — SIGNIFICANT CHANGE UP (ref 0.2–1.2)
BILIRUB UR-MCNC: NEGATIVE — SIGNIFICANT CHANGE UP
BUN SERPL-MCNC: 9 MG/DL — SIGNIFICANT CHANGE UP (ref 7–23)
CALCIUM SERPL-MCNC: 8.4 MG/DL — LOW (ref 8.5–10.1)
CHLORIDE SERPL-SCNC: 98 MMOL/L — SIGNIFICANT CHANGE UP (ref 96–108)
CHOLEST SERPL-MCNC: 126 MG/DL — SIGNIFICANT CHANGE UP
CO2 SERPL-SCNC: 23 MMOL/L — SIGNIFICANT CHANGE UP (ref 22–31)
COLOR SPEC: YELLOW — SIGNIFICANT CHANGE UP
COVID-19 SPIKE DOMAIN AB INTERP: POSITIVE
COVID-19 SPIKE DOMAIN ANTIBODY RESULT: >250 U/ML — HIGH
CREAT SERPL-MCNC: 0.68 MG/DL — SIGNIFICANT CHANGE UP (ref 0.5–1.3)
DIFF PNL FLD: ABNORMAL
ESTIMATED AVERAGE GLUCOSE: 131 MG/DL — HIGH (ref 68–114)
GLUCOSE SERPL-MCNC: 80 MG/DL — SIGNIFICANT CHANGE UP (ref 70–99)
GLUCOSE UR QL: NEGATIVE MG/DL — SIGNIFICANT CHANGE UP
HCT VFR BLD CALC: 40.9 % — SIGNIFICANT CHANGE UP (ref 39–50)
HCT VFR BLD CALC: 42 % — SIGNIFICANT CHANGE UP (ref 39–50)
HCV AB S/CO SERPL IA: 0.13 S/CO — SIGNIFICANT CHANGE UP (ref 0–0.99)
HCV AB SERPL-IMP: SIGNIFICANT CHANGE UP
HDLC SERPL-MCNC: 54 MG/DL — SIGNIFICANT CHANGE UP
HGB BLD-MCNC: 13.5 G/DL — SIGNIFICANT CHANGE UP (ref 13–17)
HGB BLD-MCNC: 13.8 G/DL — SIGNIFICANT CHANGE UP (ref 13–17)
KETONES UR-MCNC: ABNORMAL
LEUKOCYTE ESTERASE UR-ACNC: NEGATIVE — SIGNIFICANT CHANGE UP
LIPID PNL WITH DIRECT LDL SERPL: 57 MG/DL — SIGNIFICANT CHANGE UP
MAGNESIUM SERPL-MCNC: 2.2 MG/DL — SIGNIFICANT CHANGE UP (ref 1.6–2.6)
MAGNESIUM SERPL-MCNC: 2.3 MG/DL — SIGNIFICANT CHANGE UP (ref 1.6–2.6)
MCHC RBC-ENTMCNC: 26.9 PG — LOW (ref 27–34)
MCHC RBC-ENTMCNC: 26.9 PG — LOW (ref 27–34)
MCHC RBC-ENTMCNC: 32.9 GM/DL — SIGNIFICANT CHANGE UP (ref 32–36)
MCHC RBC-ENTMCNC: 33 GM/DL — SIGNIFICANT CHANGE UP (ref 32–36)
MCV RBC AUTO: 81.5 FL — SIGNIFICANT CHANGE UP (ref 80–100)
MCV RBC AUTO: 81.9 FL — SIGNIFICANT CHANGE UP (ref 80–100)
NITRITE UR-MCNC: NEGATIVE — SIGNIFICANT CHANGE UP
NON HDL CHOLESTEROL: 72 MG/DL — SIGNIFICANT CHANGE UP
PCP SPEC-MCNC: SIGNIFICANT CHANGE UP
PH UR: 6 — SIGNIFICANT CHANGE UP (ref 5–8)
PHOSPHATE SERPL-MCNC: 3.7 MG/DL — SIGNIFICANT CHANGE UP (ref 2.5–4.5)
PLATELET # BLD AUTO: 175 K/UL — SIGNIFICANT CHANGE UP (ref 150–400)
PLATELET # BLD AUTO: 176 K/UL — SIGNIFICANT CHANGE UP (ref 150–400)
POTASSIUM SERPL-MCNC: 3.8 MMOL/L — SIGNIFICANT CHANGE UP (ref 3.5–5.3)
POTASSIUM SERPL-SCNC: 3.8 MMOL/L — SIGNIFICANT CHANGE UP (ref 3.5–5.3)
PROT SERPL-MCNC: 7 GM/DL — SIGNIFICANT CHANGE UP (ref 6–8.3)
PROT UR-MCNC: 30 MG/DL
RBC # BLD: 5.02 M/UL — SIGNIFICANT CHANGE UP (ref 4.2–5.8)
RBC # BLD: 5.13 M/UL — SIGNIFICANT CHANGE UP (ref 4.2–5.8)
RBC # FLD: 15.2 % — HIGH (ref 10.3–14.5)
RBC # FLD: 15.3 % — HIGH (ref 10.3–14.5)
SARS-COV-2 IGG+IGM SERPL QL IA: >250 U/ML — HIGH
SARS-COV-2 IGG+IGM SERPL QL IA: POSITIVE
SODIUM SERPL-SCNC: 131 MMOL/L — LOW (ref 135–145)
SP GR SPEC: 1.01 — SIGNIFICANT CHANGE UP (ref 1.01–1.02)
TRIGL SERPL-MCNC: 75 MG/DL — SIGNIFICANT CHANGE UP
TROPONIN I SERPL-MCNC: 36.7 NG/ML — HIGH (ref 0.01–0.04)
TROPONIN I SERPL-MCNC: 39.7 NG/ML — HIGH (ref 0.01–0.04)
TROPONIN I SERPL-MCNC: 43.9 NG/ML — HIGH (ref 0.01–0.04)
TSH SERPL-MCNC: 0.76 UU/ML — SIGNIFICANT CHANGE UP (ref 0.34–4.82)
UROBILINOGEN FLD QL: NEGATIVE MG/DL — SIGNIFICANT CHANGE UP
WBC # BLD: 14.36 K/UL — HIGH (ref 3.8–10.5)
WBC # BLD: 14.4 K/UL — HIGH (ref 3.8–10.5)
WBC # FLD AUTO: 14.36 K/UL — HIGH (ref 3.8–10.5)
WBC # FLD AUTO: 14.4 K/UL — HIGH (ref 3.8–10.5)

## 2021-09-19 PROCEDURE — 93010 ELECTROCARDIOGRAM REPORT: CPT

## 2021-09-19 PROCEDURE — 99233 SBSQ HOSP IP/OBS HIGH 50: CPT

## 2021-09-19 PROCEDURE — 93306 TTE W/DOPPLER COMPLETE: CPT | Mod: 26

## 2021-09-19 RX ORDER — CLOPIDOGREL BISULFATE 75 MG/1
75 TABLET, FILM COATED ORAL DAILY
Refills: 0 | Status: DISCONTINUED | OUTPATIENT
Start: 2021-09-20 | End: 2021-10-01

## 2021-09-19 RX ORDER — CLOPIDOGREL BISULFATE 75 MG/1
300 TABLET, FILM COATED ORAL ONCE
Refills: 0 | Status: COMPLETED | OUTPATIENT
Start: 2021-09-19 | End: 2021-09-19

## 2021-09-19 RX ADMIN — ONDANSETRON 4 MILLIGRAM(S): 8 TABLET, FILM COATED ORAL at 10:42

## 2021-09-19 RX ADMIN — HEPARIN SODIUM 1000 UNIT(S)/HR: 5000 INJECTION INTRAVENOUS; SUBCUTANEOUS at 12:35

## 2021-09-19 RX ADMIN — MORPHINE SULFATE 4 MILLIGRAM(S): 50 CAPSULE, EXTENDED RELEASE ORAL at 18:39

## 2021-09-19 RX ADMIN — Medication 2 MILLIGRAM(S): at 01:54

## 2021-09-19 RX ADMIN — Medication 2 MILLIGRAM(S): at 13:00

## 2021-09-19 RX ADMIN — ATORVASTATIN CALCIUM 80 MILLIGRAM(S): 80 TABLET, FILM COATED ORAL at 23:19

## 2021-09-19 RX ADMIN — Medication 1 MILLIGRAM(S): at 10:17

## 2021-09-19 RX ADMIN — HEPARIN SODIUM 4100 UNIT(S): 5000 INJECTION INTRAVENOUS; SUBCUTANEOUS at 12:44

## 2021-09-19 RX ADMIN — CLOPIDOGREL BISULFATE 300 MILLIGRAM(S): 75 TABLET, FILM COATED ORAL at 08:35

## 2021-09-19 RX ADMIN — Medication 1 TABLET(S): at 10:42

## 2021-09-19 RX ADMIN — HEPARIN SODIUM 1000 UNIT(S)/HR: 5000 INJECTION INTRAVENOUS; SUBCUTANEOUS at 19:17

## 2021-09-19 RX ADMIN — Medication 81 MILLIGRAM(S): at 10:17

## 2021-09-19 RX ADMIN — MORPHINE SULFATE 4 MILLIGRAM(S): 50 CAPSULE, EXTENDED RELEASE ORAL at 19:19

## 2021-09-19 RX ADMIN — SODIUM CHLORIDE 100 MILLILITER(S): 9 INJECTION INTRAMUSCULAR; INTRAVENOUS; SUBCUTANEOUS at 23:20

## 2021-09-19 RX ADMIN — SODIUM CHLORIDE 100 MILLILITER(S): 9 INJECTION INTRAMUSCULAR; INTRAVENOUS; SUBCUTANEOUS at 12:59

## 2021-09-19 RX ADMIN — Medication 2 MILLIGRAM(S): at 10:42

## 2021-09-19 RX ADMIN — Medication 1.5 MILLIGRAM(S): at 23:20

## 2021-09-19 RX ADMIN — Medication 2 MILLIGRAM(S): at 17:01

## 2021-09-19 RX ADMIN — HEPARIN SODIUM 800 UNIT(S)/HR: 5000 INJECTION INTRAVENOUS; SUBCUTANEOUS at 04:48

## 2021-09-19 RX ADMIN — Medication 100 MILLIGRAM(S): at 10:42

## 2021-09-19 RX ADMIN — SODIUM CHLORIDE 100 MILLILITER(S): 9 INJECTION INTRAMUSCULAR; INTRAVENOUS; SUBCUTANEOUS at 01:08

## 2021-09-19 RX ADMIN — CLOPIDOGREL BISULFATE 300 MILLIGRAM(S): 75 TABLET, FILM COATED ORAL at 01:08

## 2021-09-19 RX ADMIN — LISINOPRIL 5 MILLIGRAM(S): 2.5 TABLET ORAL at 10:18

## 2021-09-19 NOTE — CONSULT NOTE ADULT - SUBJECTIVE AND OBJECTIVE BOX
Patient is a 55y old  Male who presents with a chief complaint of Abdominal pain.       HPI:  54 yo Male presented with upper abdominal pain, gradually worsening over the past week. Patient states he has also has associated nausea, vomiting and occasional loose stools. Abdominal pain is mainly in the epigastric area, no radiation of the pain. States symptoms seem similar to prior alcoholic pancreatitis years ago. No past abdominal surgical Hx. He is a +Chronic Drinker, had 3-4 drinks today. Occasional+Marijuana use also. Denies any chest pain, sob, dizziness or headache.     Pt seen this am.  He denies any CP or SOB.         PAST MEDICAL & SURGICAL HISTORY:  Mastocytosis    Pancreatitis    Opioid dependence    No significant past surgical history        MEDICATIONS  (STANDING):  aspirin enteric coated 81 milliGRAM(s) Oral daily  atorvastatin 80 milliGRAM(s) Oral at bedtime  clopidogrel Tablet 75 milliGRAM(s) Oral daily  folic acid 1 milliGRAM(s) Oral daily  heparin  Infusion.  Unit(s)/Hr (8 mL/Hr) IV Continuous <Continuous>  lisinopril 5 milliGRAM(s) Oral daily  LORazepam     Tablet   Oral   LORazepam     Tablet 2 milliGRAM(s) Oral every 4 hours  LORazepam     Tablet 1.5 milliGRAM(s) Oral every 4 hours  multivitamin 1 Tablet(s) Oral daily  sodium chloride 0.9%. 1000 milliLiter(s) (100 mL/Hr) IV Continuous <Continuous>  thiamine 100 milliGRAM(s) Oral daily    MEDICATIONS  (PRN):  acetaminophen   Tablet .. 650 milliGRAM(s) Oral every 6 hours PRN Mild Pain (1 - 3)  heparin   Injectable 4100 Unit(s) IV Push every 6 hours PRN For aPTT less than 40  LORazepam   Injectable 2 milliGRAM(s) IV Push every 1 hour PRN Symptom-triggered: each CIWA -Ar score 8 or GREATER  morphine  - Injectable 4 milliGRAM(s) IV Push every 4 hours PRN Severe Pain (7 - 10)  ondansetron Injectable 4 milliGRAM(s) IV Push every 6 hours PRN Nausea and/or Vomiting      FAMILY HISTORY:  No pertinent family history in first degree relatives        SOCIAL HISTORY:    REVIEW OF SYSTEMS:  CONSTITUTIONAL:    No fatigue, malaise, lethargy.  No fever or chills.     RESPIRATORY:  No cough.  No wheeze.  No hemoptysis.  No shortness of breath.  CARDIOVASCULAR:  No chest pains.  No palpitations. No shortness of breath, No orthopnea or PND.  GASTROINTESTINAL:  c/o abdominal pain.  No nausea or vomiting.    GENITOURINARY:    No hematuria.    MUSCULOSKELETAL:  No musculoskeletal pain.  No joint swelling.  No arthritis.  NEUROLOGICAL:  No tingling or numbness or weakness.  PSYCHIATRIC:  No confusion  SKIN:  No rashes.          Vital Signs Last 24 Hrs  T(C): 36.7 (19 Sep 2021 06:40), Max: 36.7 (18 Sep 2021 17:53)  T(F): 98.1 (19 Sep 2021 06:40), Max: 98.1 (18 Sep 2021 17:53)  HR: 72 (19 Sep 2021 06:40) (61 - 80)  BP: 137/85 (19 Sep 2021 06:40) (109/48 - 154/81)  BP(mean): --  RR: 17 (19 Sep 2021 06:40) (17 - 17)  SpO2: 98% (19 Sep 2021 06:40) (97% - 98%)    PHYSICAL EXAM-    Constitutional: The patient appears to be normal, well developed, well nourished and alert and oriented to time, place and person. The patient does not appear acutely ill.     Head: Head is normocephalic and atraumatic.      Neck: No JVD.     Cardiovascular: Regular rate and rhythm without S3, S4. No murmurs or rubs are appreciated.      Respiratory: Breathsounds are normal. No rales. No wheezing.    Abdomen: Soft, nontender, nondistended with positive bowel sounds.      Extremity: No tenderness. No  pitting edema     Neurologic: The patient is alert and oriented.      Skin: No rash, no obvious lesions noted.      Psychiatric: The patient appears to be emotionally stable.      INTERPRETATION OF TELEMETRY:  SR, NSVT- one with 14 beats,.     ECG: Sinus rythm , normal axis, no ST T changes.     I&O's Detail      LABS:                        13.8   14.36 )-----------( 176      ( 19 Sep 2021 06:50 )             42.0     09-18    131<L>  |  97  |  8   ----------------------------<  111<H>  4.3   |  24  |  0.74    Ca    8.8      18 Sep 2021 18:27  Phos  3.7       Mg     2.2     18    TPro  7.8  /  Alb  3.7  /  TBili  0.5  /  DBili  x   /  AST  60<H>  /  ALT  42  /  AlkPhos  104  -18    CARDIAC MARKERS ( 19 Sep 2021 01:57 )  39.700 ng/mL / x     / x     / x     / x      CARDIAC MARKERS ( 18 Sep 2021 22:23 )  15.400 ng/mL / x     / x     / x     / x      CARDIAC MARKERS ( 18 Sep 2021 18:27 )  4.610 ng/mL / x     / 256 U/L / x     / x          PT/INR - ( 18 Sep 2021 18:27 )   PT: 13.2 sec;   INR: 1.14 ratio         PTT - ( 19 Sep 2021 03:34 )  PTT:52.7 sec  Urinalysis Basic - ( 19 Sep 2021 04:20 )    Color: Yellow / Appearance: Clear / S.010 / pH: x  Gluc: x / Ketone: Large  / Bili: Negative / Urobili: Negative mg/dL   Blood: x / Protein: 30 mg/dL / Nitrite: Negative   Leuk Esterase: Negative / RBC: 3-5 /HPF / WBC 0-2   Sq Epi: x / Non Sq Epi: x / Bacteria: Occasional      I&O's Summary    BNPSerum Pro-Brain Natriuretic Peptide: 132 pg/mL ( @ 18:27)    RADIOLOGY & ADDITIONAL STUDIES:  < from: CT Abdomen and Pelvis w/ IV Cont (21 @ 19:49) >  EXAM:  CT ABDOMEN AND PELVIS IC                            PROCEDURE DATE:  2021          INTERPRETATION:  CLINICAL INFORMATION: Upper abdominal pain. History of pancreatitis.    COMPARISON: CT abdomen/pelvis of 2015.    CONTRAST/COMPLICATIONS:  IV Contrast: Omnipaque 350  90 cc administered   10 cc discarded  Oral Contrast: Water  Complications: None reported at time of study completion    PROCEDURE:  CT of the Abdomen and Pelvis was performed.  Arterial and Portal Venous phases were acquired.  Sagittal and coronal reformats were performed.    FINDINGS:  LOWER CHEST: Within normal limits.    LIVER: Steatosis.  BILE DUCTS: Normal caliber.  GALLBLADDER: Within normal limits.  SPLEEN: Within normal limits.  PANCREAS: Peripancreaticinflammation. Grossly homogeneous parenchymal enhancement. No discrete pancreatic ductal dilatation. No drainable fluid collection.  ADRENALS: Within normal limits.  KIDNEYS/URETERS: Punctate nonobstructing right interpole renal calculus.    BLADDER:Within normal limits.  REPRODUCTIVE ORGANS: Prostate is mildly enlarged.    BOWEL: No bowel obstruction. Appendix is normal.  PERITONEUM: No ascites.  VESSELS: Minimal atherosclerotic calcifications of the abdominal aorta.. Patent portal, splenic, and superior mesenteric veins.  RETROPERITONEUM/LYMPH NODES: No lymphadenopathy.  ABDOMINAL WALL: Tiny fat-containing right inguinal hernia.  BONES: Degenerative changes.    IMPRESSION:  Acute interstitial edematous pancreatitis.    Hepatic steatosis.        --- End of Report ---            VIRAJ GREGORY MD; Attending Radiologist  This document has been electronically signed. Sep 18 2021  8:03PM    < end of copied text >

## 2021-09-19 NOTE — ED ADULT NURSE REASSESSMENT NOTE - NS ED NURSE REASSESS COMMENT FT1
recvd pt from night RN sleeping in bed with rails up, cardiac monitoring in place. Heparing infusing at 8ml/hr as ordered. Repeat PTT due at 1019. Call bell within reach.

## 2021-09-19 NOTE — CONSULT NOTE ADULT - ASSESSMENT
NSTEMI- will give plavix 300 mg today loading.  Yesterday received 75 mg.  continue ASA , heparin drip.  hold BB  Cocaine positive.  will plan on [facility] once acute pancreatitis resolved.  WIll check echo today.  Admit to CICU    NSVT- in the setting of NSEMI.  EP consult recommended.  Goal k of 4 and mag of 2.     Acute pancreatitis- Management per primary team.     Tobacco and cocaine abuse- Advised strict cessation.  Explained the risks of smoking to the patient at length including but not limited to massive MI,CVA and death.  Patient expressed full understanding of this.     alcohol abuse- Management per primary team.   Monitor for DT closely.     Other medical issues- Management per primary team.   Thank you for allowing me to participate in the care of this patient. Please feel free to contact me with any questions.

## 2021-09-19 NOTE — PROGRESS NOTE ADULT - SUBJECTIVE AND OBJECTIVE BOX
Reason for Admission: Abdominal pain  History of Present Illness:   56 yo Male presented with upper abdominal pain, gradually worsening over the past week. Patient states he has also has associated nausea, vomiting and occasional loose stools. Abdominal pain is mainly in the epigastric area, no radiation of the pain. States symptoms seem similar to prior alcoholic pancreatitis years ago. No past abdominal surgical Hx. He is a +Chronic Drinker, had 3-4 drinks today. Occasional+Marijuana use also. Denies any chest pain, sob, dizziness or headache.     REVIEW OF SYSTEMS:  General: NAD, hemodynamically stable, (-)  fever, (-) chills, (-) weakness  HEENT:  Eyes:  No visual loss, blurred vision, double vision or yellow sclerae. Ears, Nose, Throat:  No hearing loss, sneezing, congestion, runny nose or sore throat.  SKIN:  No rash or itching.  CARDIOVASCULAR:  No chest pain, chest pressure or chest discomfort. No palpitations or edema.  RESPIRATORY:  No shortness of breath, cough or sputum.  GASTROINTESTINAL:  No anorexia, nausea, vomiting or diarrhea. No abdominal pain or blood.  NEUROLOGICAL:  No headache, dizziness, syncope, paralysis, ataxia, numbness or tingling in the extremities. No change in bowel or bladder control.  MUSCULOSKELETAL:  No muscle, back pain, joint pain or stiffness.  HEMATOLOGIC:  No anemia, bleeding or bruising.  LYMPHATICS:  No enlarged nodes. No history of splenectomy.  ENDOCRINOLOGIC:  No reports of sweating, cold or heat intolerance. No polyuria or polydipsia.  ALLERGIES:  No history of asthma, hives, eczema or rhinitis.    Physical Exam:   GENERAL APPEARANCE:  NAD, hemodynamically stable  T(C): 36.7 (21 @ 08:34), Max: 37.2 (21 @ 07:41)  HR: 83 (21 @ 08:34) (61 - 87)  BP: 134/89 (21 @ 08:34) (109/48 - 154/81)  RR: 15 (21 @ 08:34) (15 - 19)  SpO2: 97% (21 @ 08:34) (97% - 98%)  Wt(kg): --  HEENT:  Head is normocephalic    Skin:  Warm and dry without any rash   NECK:  Supple without lymphadenopathy.   HEART:  Regular rate and rhythm. normal S1 and S2, No M/R/G  LUNGS:  Good ins/exp effort, no W/R/R/C  ABDOMEN:  Soft, nontender, nondistended with good bowel sounds heard  EXTREMITIES:  Without cyanosis, clubbing or edema.   NEUROLOGICAL:  Gross nonfocal       CBC Full  -  ( 19 Sep 2021 06:50 )  WBC Count : 14.36 K/uL  RBC Count : 5.13 M/uL  Hemoglobin : 13.8 g/dL  Hematocrit : 42.0 %  Platelet Count - Automated : 176 K/uL  Mean Cell Volume : 81.9 fl  Mean Cell Hemoglobin : 26.9 pg  Mean Cell Hemoglobin Concentration : 32.9 gm/dL  Auto Neutrophil # : x  Auto Lymphocyte # : x  Auto Monocyte # : x  Auto Eosinophil # : x  Auto Basophil # : x  Auto Neutrophil % : x  Auto Lymphocyte % : x  Auto Monocyte % : x  Auto Eosinophil % : x  Auto Basophil % : x    PT/INR - ( 18 Sep 2021 18:27 )   PT: 13.2 sec;   INR: 1.14 ratio         PTT - ( 19 Sep 2021 03:34 )  PTT:52.7 sec  Urinalysis Basic - ( 19 Sep 2021 04:20 )    Color: Yellow / Appearance: Clear / S.010 / pH: x  Gluc: x / Ketone: Large  / Bili: Negative / Urobili: Negative mg/dL   Blood: x / Protein: 30 mg/dL / Nitrite: Negative   Leuk Esterase: Negative / RBC: 3-5 /HPF / WBC 0-2   Sq Epi: x / Non Sq Epi: x / Bacteria: Occasional          131<L>  |  98  |  9   ----------------------------<  80  3.8   |  23  |  0.68    Ca    8.4<L>      19 Sep 2021 06:50  Phos  3.7       Mg     2.3         TPro  7.0  /  Alb  3.2<L>  /  TBili  0.6  /  DBili  x   /  AST  205<H>  /  ALT  49  /  AlkPhos  97        # NSTEMI  # NSVT  - Plavix loading 300 mg po x 1, then continue plavix 75 mg po qdaily  - DAPT / STATIN /  Heparin drip  - BB held in the setting of cocaine use  - troponins trending up -  will continue to trend  - [facility] as per cardiology    # Acute Pancreatitis   - CT scan: Acute interstitial edematous pancreatitis, Hepatic steatosis.  - on pain meds and nausea medication  - follow CMP  - follow lipid profile  - advised to quit drinking alcohol    # Alcohol dependence  # Opiod /  benzo /  THC dependance  - Alcohol withdrawl  - keep on CIWA protocol  - Ativan taper  - on folic acid, MVI, Thiamine  - fall and seizure precaution    # Heparin for DVT ppx    # Code status: Patient is in full code status.    Reason for Admission: Abdominal pain  History of Present Illness:   56 yo Male presented with upper abdominal pain, gradually worsening over the past week. Patient states he has also has associated nausea, vomiting and occasional loose stools. Abdominal pain is mainly in the epigastric area, no radiation of the pain. States symptoms seem similar to prior alcoholic pancreatitis years ago. No past abdominal surgical Hx. He is a +Chronic Drinker, had 3-4 drinks today. Occasional+Marijuana use also. Denies any chest pain, sob, dizziness or headache.     Patient seen and evaluated. very sleepy. Notes of band like pain around the epigastric region, similar to what he had years ago. Patient also notes of chest discomfort -  this was discussed with Dr. virk (plan to obtain EKG /  troponins). Patient hemodynamically stable. Had brief episode of NSVT yesterday -  I suspect this is most likely ischemia related.     REVIEW OF SYSTEMS:  General: NAD, hemodynamically stable   HEENT:  Eyes:  No visual loss, blurred vision, double vision or yellow sclerae. Ears, Nose, Throat:  No hearing loss, sneezing, congestion, runny nose or sore throat.  SKIN:  No rash or itching.  CARDIOVASCULAR:  very mild chest pain, but abdominal pain masks chest pain as per patient. Denies any Jaw pain, shoulder pain or back pain. not SOB /  not diaphoretic.   RESPIRATORY:  No shortness of breath, cough or sputum.  GASTROINTESTINAL:  Abdominal pain  NEUROLOGICAL:  No headache, dizziness, syncope, paralysis, ataxia, numbness or tingling in the extremities. No change in bowel or bladder control.  MUSCULOSKELETAL:  No muscle, back pain, joint pain or stiffness.  HEMATOLOGIC:  No anemia, bleeding or bruising.  LYMPHATICS:  No enlarged nodes. No history of splenectomy.  ENDOCRINOLOGIC:  No reports of sweating, cold or heat intolerance. No polyuria or polydipsia.  ALLERGIES:  No history of asthma, hives, eczema or rhinitis.    Physical Exam:   GENERAL APPEARANCE:  NAD, hemodynamically stable  T(C): 36.7 (21 @ 08:34), Max: 37.2 (21 @ 07:41)  HR: 83 (21 @ 08:34) (61 - 87)  BP: 134/89 (21 @ 08:34) (109/48 - 154/81)  RR: 15 (21 @ 08:34) (15 - )  SpO2: 97% (21 @ 08:34) (97% - 98%)  HEENT:  Head is normocephalic    Skin:  Warm and dry without any rash   NECK:  Supple without lymphadenopathy.   HEART:  Regular rate and rhythm. normal S1 and S2, No M/R/G  LUNGS:  Good ins/exp effort, no W/R/R/C  ABDOMEN:  Soft, nontender, nondistended with good bowel sounds heard  EXTREMITIES:  Without cyanosis, clubbing or edema.   NEUROLOGICAL:  Gross nonfocal     Labs:   CBC Full  -  ( 19 Sep 2021 06:50 )  WBC Count : 14.36 K/uL  RBC Count : 5.13 M/uL  Hemoglobin : 13.8 g/dL  Hematocrit : 42.0 %  Platelet Count - Automated : 176 K/uL  Mean Cell Volume : 81.9 fl  Mean Cell Hemoglobin : 26.9 pg  Mean Cell Hemoglobin Concentration : 32.9 gm/dL  Auto Neutrophil # : x  Auto Lymphocyte # : x  Auto Monocyte # : x  Auto Eosinophil # : x  Auto Basophil # : x  Auto Neutrophil % : x  Auto Lymphocyte % : x  Auto Monocyte % : x  Auto Eosinophil % : x  Auto Basophil % : x    PT/INR - ( 18 Sep 2021 18:27 )   PT: 13.2 sec;   INR: 1.14 ratio         PTT - ( 19 Sep 2021 03:34 )  PTT:52.7 sec  Urinalysis Basic - ( 19 Sep 2021 04:20 )    Color: Yellow / Appearance: Clear / S.010 / pH: x  Gluc: x / Ketone: Large  / Bili: Negative / Urobili: Negative mg/dL   Blood: x / Protein: 30 mg/dL / Nitrite: Negative   Leuk Esterase: Negative / RBC: 3-5 /HPF / WBC 0-2   Sq Epi: x / Non Sq Epi: x / Bacteria: Occasional          131<L>  |  98  |  9   ----------------------------<  80  3.8   |  23  |  0.68    Ca    8.4<L>      19 Sep 2021 06:50  Phos  3.7       Mg     2.3         TPro  7.0  /  Alb  3.2<L>  /  TBili  0.6  /  DBili  x   /  AST  205<H>  /  ALT  49  /  AlkPhos  97  -      # NSTEMI  # NSVT   - patient with mild chest pain -  Dr. Virk informed - troponins / EKG ordered, discussed with nursing  - Plavix loading 300 mg po x 1, then continue Plavix 75 mg po qdaily  - DAPT / STATIN /  Heparin drip  - troponin trending up -  will continue to trend till patient's troponin starts to trend down  - [facility] as per cardiology    # Acute Pancreatitis secondary to alcohol use  - Melvi criteria 0  - CT scan: Acute interstitial edematous pancreatitis, Hepatic steatosis.  - advised to quit drinking alcohol  - IV hydration  - at this time, I am not consulting GI. If the symptoms to worsen with worsening lab work will ask for GI eval    # Alcohol dependence  # Opiod /  benzo /  THC dependance  - Alcohol withdrawl  - keep on CIWA protocol  - Ativan taper  - on folic acid, MVI, Thiamine  - fall and seizure precaution    # Heparin for DVT ppx    # Code status: Patient is in full code status.    Reason for Admission: Abdominal pain  History of Present Illness:   56 yo Male presented with upper abdominal pain, gradually worsening over the past week. Patient states he has also has associated nausea, vomiting and occasional loose stools. Abdominal pain is mainly in the epigastric area, no radiation of the pain. States symptoms seem similar to prior alcoholic pancreatitis years ago. No past abdominal surgical Hx. He is a +Chronic Drinker, had 3-4 drinks today. Occasional+Marijuana use also. Denies any chest pain, sob, dizziness or headache.     Patient seen and evaluated. very sleepy. Notes of band like pain around the epigastric region, similar to what he had years ago. Patient also notes of chest discomfort -  this was discussed with Dr. virk (plan to obtain EKG /  troponins). Patient hemodynamically stable. Had brief episode of NSVT yesterday -  I suspect this is most likely ischemia related. I repeated the EKG in the ED -  which show new T wave inversions in II, III, AVF.     REVIEW OF SYSTEMS:  General: NAD, hemodynamically stable   HEENT:  Eyes:  No visual loss, blurred vision, double vision or yellow sclerae. Ears, Nose, Throat:  No hearing loss, sneezing, congestion, runny nose or sore throat.  SKIN:  No rash or itching.  CARDIOVASCULAR:  very mild chest pain, but abdominal pain masks chest pain as per patient. Denies any Jaw pain, shoulder pain or back pain. not SOB /  not diaphoretic.   RESPIRATORY:  No shortness of breath, cough or sputum.  GASTROINTESTINAL:  Abdominal pain  NEUROLOGICAL:  No headache, dizziness, syncope, paralysis, ataxia, numbness or tingling in the extremities. No change in bowel or bladder control.  MUSCULOSKELETAL:  No muscle, back pain, joint pain or stiffness.  HEMATOLOGIC:  No anemia, bleeding or bruising.  LYMPHATICS:  No enlarged nodes. No history of splenectomy.  ENDOCRINOLOGIC:  No reports of sweating, cold or heat intolerance. No polyuria or polydipsia.  ALLERGIES:  No history of asthma, hives, eczema or rhinitis.    Physical Exam:   GENERAL APPEARANCE:  NAD, hemodynamically stable  T(C): 36.7 (21 @ 08:34), Max: 37.2 (21 @ 07:41)  HR: 83 (21 @ 08:34) (61 - 87)  BP: 134/89 (21 @ 08:34) (109/48 - 154/81)  RR: 15 (21 @ 08:34) (15 - 19)  SpO2: 97% (21 @ 08:34) (97% - 98%)  HEENT:  Head is normocephalic    Skin:  Warm and dry without any rash   NECK:  Supple without lymphadenopathy.   HEART:  Regular rate and rhythm. normal S1 and S2, No M/R/G  LUNGS:  Good ins/exp effort, no W/R/R/C  ABDOMEN:  Soft, nontender, nondistended with good bowel sounds heard  EXTREMITIES:  Without cyanosis, clubbing or edema.   NEUROLOGICAL:  Gross nonfocal     Labs:   CBC Full  -  ( 19 Sep 2021 06:50 )  WBC Count : 14.36 K/uL  RBC Count : 5.13 M/uL  Hemoglobin : 13.8 g/dL  Hematocrit : 42.0 %  Platelet Count - Automated : 176 K/uL  Mean Cell Volume : 81.9 fl  Mean Cell Hemoglobin : 26.9 pg  Mean Cell Hemoglobin Concentration : 32.9 gm/dL  Auto Neutrophil # : x  Auto Lymphocyte # : x  Auto Monocyte # : x  Auto Eosinophil # : x  Auto Basophil # : x  Auto Neutrophil % : x  Auto Lymphocyte % : x  Auto Monocyte % : x  Auto Eosinophil % : x  Auto Basophil % : x    PT/INR - ( 18 Sep 2021 18:27 )   PT: 13.2 sec;   INR: 1.14 ratio         PTT - ( 19 Sep 2021 03:34 )  PTT:52.7 sec  Urinalysis Basic - ( 19 Sep 2021 04:20 )    Color: Yellow / Appearance: Clear / S.010 / pH: x  Gluc: x / Ketone: Large  / Bili: Negative / Urobili: Negative mg/dL   Blood: x / Protein: 30 mg/dL / Nitrite: Negative   Leuk Esterase: Negative / RBC: 3-5 /HPF / WBC 0-2   Sq Epi: x / Non Sq Epi: x / Bacteria: Occasional          131<L>  |  98  |  9   ----------------------------<  80  3.8   |  23  |  0.68    Ca    8.4<L>      19 Sep 2021 06:50  Phos  3.7       Mg     2.3     09-19    TPro  7.0  /  Alb  3.2<L>  /  TBili  0.6  /  DBili  x   /  AST  205<H>  /  ALT  49  /  AlkPhos  97        # NSTEMI  # NSVT   - patient with mild chest pain -  Dr. Virk informed - troponins / EKG ordered, discussed with nursing  - Plavix loading 300 mg po x 1, then continue Plavix 75 mg po qdaily  - DAPT / STATIN /  Heparin drip  - troponin trending up -  will continue to trend till patient's troponin starts to trend down  - [facility] as per cardiology    # Acute Pancreatitis secondary to alcohol use  - Melvi criteria 0  - CT scan: Acute interstitial edematous pancreatitis, Hepatic steatosis.  - advised to quit drinking alcohol  - IV hydration  - at this time, I am not consulting GI. If the symptoms to worsen with worsening lab work will ask for GI eval    # Alcohol dependence  # Opiod /  benzo /  THC dependance  - Alcohol withdrawl  - keep on CIWA protocol  - Ativan taper  - on folic acid, MVI, Thiamine  - fall and seizure precaution    # Heparin for DVT ppx    # Code status: Patient is in full code status.

## 2021-09-20 LAB
ALBUMIN SERPL ELPH-MCNC: 2.7 G/DL — LOW (ref 3.3–5)
ALP SERPL-CCNC: 78 U/L — SIGNIFICANT CHANGE UP (ref 40–120)
ALT FLD-CCNC: 54 U/L — SIGNIFICANT CHANGE UP (ref 12–78)
ANION GAP SERPL CALC-SCNC: 7 MMOL/L — SIGNIFICANT CHANGE UP (ref 5–17)
ANION GAP SERPL CALC-SCNC: 9 MMOL/L — SIGNIFICANT CHANGE UP (ref 5–17)
APTT BLD: 43.3 SEC — HIGH (ref 27.5–35.5)
APTT BLD: 45.6 SEC — HIGH (ref 27.5–35.5)
APTT BLD: 51.2 SEC — HIGH (ref 27.5–35.5)
AST SERPL-CCNC: 86 U/L — HIGH (ref 15–37)
BILIRUB DIRECT SERPL-MCNC: 0.2 MG/DL — SIGNIFICANT CHANGE UP (ref 0–0.2)
BILIRUB INDIRECT FLD-MCNC: 0.4 MG/DL — SIGNIFICANT CHANGE UP (ref 0.2–1)
BILIRUB SERPL-MCNC: 0.6 MG/DL — SIGNIFICANT CHANGE UP (ref 0.2–1.2)
BUN SERPL-MCNC: 10 MG/DL — SIGNIFICANT CHANGE UP (ref 7–23)
BUN SERPL-MCNC: 10 MG/DL — SIGNIFICANT CHANGE UP (ref 7–23)
CALCIUM SERPL-MCNC: 7.9 MG/DL — LOW (ref 8.5–10.1)
CALCIUM SERPL-MCNC: 8 MG/DL — LOW (ref 8.5–10.1)
CHLORIDE SERPL-SCNC: 102 MMOL/L — SIGNIFICANT CHANGE UP (ref 96–108)
CHLORIDE SERPL-SCNC: 105 MMOL/L — SIGNIFICANT CHANGE UP (ref 96–108)
CO2 SERPL-SCNC: 22 MMOL/L — SIGNIFICANT CHANGE UP (ref 22–31)
CO2 SERPL-SCNC: 23 MMOL/L — SIGNIFICANT CHANGE UP (ref 22–31)
CREAT SERPL-MCNC: 0.49 MG/DL — LOW (ref 0.5–1.3)
CREAT SERPL-MCNC: 0.54 MG/DL — SIGNIFICANT CHANGE UP (ref 0.5–1.3)
CULTURE RESULTS: SIGNIFICANT CHANGE UP
GLUCOSE SERPL-MCNC: 72 MG/DL — SIGNIFICANT CHANGE UP (ref 70–99)
GLUCOSE SERPL-MCNC: 83 MG/DL — SIGNIFICANT CHANGE UP (ref 70–99)
HCT VFR BLD CALC: 33.4 % — LOW (ref 39–50)
HCT VFR BLD CALC: 33.4 % — LOW (ref 39–50)
HEPARIN-PF4 AB RESULT: <0.6 U/ML — SIGNIFICANT CHANGE UP (ref 0–0.9)
HGB BLD-MCNC: 11.1 G/DL — LOW (ref 13–17)
HGB BLD-MCNC: 11.1 G/DL — LOW (ref 13–17)
MAGNESIUM SERPL-MCNC: 2.2 MG/DL — SIGNIFICANT CHANGE UP (ref 1.6–2.6)
MCHC RBC-ENTMCNC: 27.1 PG — SIGNIFICANT CHANGE UP (ref 27–34)
MCHC RBC-ENTMCNC: 27.2 PG — SIGNIFICANT CHANGE UP (ref 27–34)
MCHC RBC-ENTMCNC: 33.2 GM/DL — SIGNIFICANT CHANGE UP (ref 32–36)
MCHC RBC-ENTMCNC: 33.2 GM/DL — SIGNIFICANT CHANGE UP (ref 32–36)
MCV RBC AUTO: 81.5 FL — SIGNIFICANT CHANGE UP (ref 80–100)
MCV RBC AUTO: 81.9 FL — SIGNIFICANT CHANGE UP (ref 80–100)
PF4 HEPARIN CMPLX AB SER-ACNC: NEGATIVE — SIGNIFICANT CHANGE UP
PHOSPHATE SERPL-MCNC: 1.7 MG/DL — LOW (ref 2.5–4.5)
PLATELET # BLD AUTO: 112 K/UL — LOW (ref 150–400)
PLATELET # BLD AUTO: 119 K/UL — LOW (ref 150–400)
PLATELET # BLD AUTO: 119 K/UL — LOW (ref 150–400)
POTASSIUM SERPL-MCNC: 3.5 MMOL/L — SIGNIFICANT CHANGE UP (ref 3.5–5.3)
POTASSIUM SERPL-MCNC: 4.2 MMOL/L — SIGNIFICANT CHANGE UP (ref 3.5–5.3)
POTASSIUM SERPL-SCNC: 3.5 MMOL/L — SIGNIFICANT CHANGE UP (ref 3.5–5.3)
POTASSIUM SERPL-SCNC: 4.2 MMOL/L — SIGNIFICANT CHANGE UP (ref 3.5–5.3)
PROT SERPL-MCNC: 6.4 GM/DL — SIGNIFICANT CHANGE UP (ref 6–8.3)
RBC # BLD: 4.08 M/UL — LOW (ref 4.2–5.8)
RBC # BLD: 4.1 M/UL — LOW (ref 4.2–5.8)
RBC # FLD: 14.6 % — HIGH (ref 10.3–14.5)
RBC # FLD: 15.1 % — HIGH (ref 10.3–14.5)
SODIUM SERPL-SCNC: 132 MMOL/L — LOW (ref 135–145)
SODIUM SERPL-SCNC: 136 MMOL/L — SIGNIFICANT CHANGE UP (ref 135–145)
SPECIMEN SOURCE: SIGNIFICANT CHANGE UP
WBC # BLD: 10.8 K/UL — HIGH (ref 3.8–10.5)
WBC # BLD: 9.43 K/UL — SIGNIFICANT CHANGE UP (ref 3.8–10.5)
WBC # FLD AUTO: 10.8 K/UL — HIGH (ref 3.8–10.5)
WBC # FLD AUTO: 9.43 K/UL — SIGNIFICANT CHANGE UP (ref 3.8–10.5)

## 2021-09-20 PROCEDURE — 99233 SBSQ HOSP IP/OBS HIGH 50: CPT

## 2021-09-20 RX ORDER — POTASSIUM CHLORIDE 20 MEQ
40 PACKET (EA) ORAL EVERY 4 HOURS
Refills: 0 | Status: COMPLETED | OUTPATIENT
Start: 2021-09-20 | End: 2021-09-20

## 2021-09-20 RX ORDER — HEPARIN SODIUM 5000 [USP'U]/ML
INJECTION INTRAVENOUS; SUBCUTANEOUS
Qty: 25000 | Refills: 0 | Status: DISCONTINUED | OUTPATIENT
Start: 2021-09-20 | End: 2021-09-24

## 2021-09-20 RX ORDER — HEPARIN SODIUM 5000 [USP'U]/ML
4100 INJECTION INTRAVENOUS; SUBCUTANEOUS ONCE
Refills: 0 | Status: COMPLETED | OUTPATIENT
Start: 2021-09-20 | End: 2021-09-20

## 2021-09-20 RX ORDER — HEPARIN SODIUM 5000 [USP'U]/ML
4100 INJECTION INTRAVENOUS; SUBCUTANEOUS EVERY 6 HOURS
Refills: 0 | Status: DISCONTINUED | OUTPATIENT
Start: 2021-09-20 | End: 2021-09-27

## 2021-09-20 RX ORDER — FONDAPARINUX SODIUM 2.5 MG/.5ML
7.5 INJECTION, SOLUTION SUBCUTANEOUS DAILY
Refills: 0 | Status: DISCONTINUED | OUTPATIENT
Start: 2021-09-20 | End: 2021-09-20

## 2021-09-20 RX ADMIN — HEPARIN SODIUM 1150 UNIT(S)/HR: 5000 INJECTION INTRAVENOUS; SUBCUTANEOUS at 02:29

## 2021-09-20 RX ADMIN — ATORVASTATIN CALCIUM 80 MILLIGRAM(S): 80 TABLET, FILM COATED ORAL at 22:38

## 2021-09-20 RX ADMIN — SODIUM CHLORIDE 100 MILLILITER(S): 9 INJECTION INTRAMUSCULAR; INTRAVENOUS; SUBCUTANEOUS at 20:16

## 2021-09-20 RX ADMIN — Medication 1 MILLIGRAM(S): at 13:45

## 2021-09-20 RX ADMIN — Medication 2 MILLIGRAM(S): at 22:38

## 2021-09-20 RX ADMIN — Medication 40 MILLIEQUIVALENT(S): at 13:47

## 2021-09-20 RX ADMIN — HEPARIN SODIUM 800 UNIT(S)/HR: 5000 INJECTION INTRAVENOUS; SUBCUTANEOUS at 13:40

## 2021-09-20 RX ADMIN — HEPARIN SODIUM 950 UNIT(S)/HR: 5000 INJECTION INTRAVENOUS; SUBCUTANEOUS at 22:39

## 2021-09-20 RX ADMIN — MORPHINE SULFATE 4 MILLIGRAM(S): 50 CAPSULE, EXTENDED RELEASE ORAL at 00:38

## 2021-09-20 RX ADMIN — Medication 2 MILLIGRAM(S): at 17:34

## 2021-09-20 RX ADMIN — MORPHINE SULFATE 4 MILLIGRAM(S): 50 CAPSULE, EXTENDED RELEASE ORAL at 20:16

## 2021-09-20 RX ADMIN — Medication 1 MILLIGRAM(S): at 09:15

## 2021-09-20 RX ADMIN — SODIUM CHLORIDE 100 MILLILITER(S): 9 INJECTION INTRAMUSCULAR; INTRAVENOUS; SUBCUTANEOUS at 09:17

## 2021-09-20 RX ADMIN — Medication 1.5 MILLIGRAM(S): at 09:16

## 2021-09-20 RX ADMIN — MORPHINE SULFATE 4 MILLIGRAM(S): 50 CAPSULE, EXTENDED RELEASE ORAL at 06:16

## 2021-09-20 RX ADMIN — Medication 81 MILLIGRAM(S): at 09:15

## 2021-09-20 RX ADMIN — Medication 1.5 MILLIGRAM(S): at 02:30

## 2021-09-20 RX ADMIN — MORPHINE SULFATE 4 MILLIGRAM(S): 50 CAPSULE, EXTENDED RELEASE ORAL at 20:30

## 2021-09-20 RX ADMIN — Medication 1.5 MILLIGRAM(S): at 06:16

## 2021-09-20 RX ADMIN — MORPHINE SULFATE 4 MILLIGRAM(S): 50 CAPSULE, EXTENDED RELEASE ORAL at 01:08

## 2021-09-20 RX ADMIN — MORPHINE SULFATE 4 MILLIGRAM(S): 50 CAPSULE, EXTENDED RELEASE ORAL at 16:35

## 2021-09-20 RX ADMIN — Medication 100 MILLIGRAM(S): at 09:15

## 2021-09-20 RX ADMIN — MORPHINE SULFATE 4 MILLIGRAM(S): 50 CAPSULE, EXTENDED RELEASE ORAL at 16:20

## 2021-09-20 RX ADMIN — MORPHINE SULFATE 4 MILLIGRAM(S): 50 CAPSULE, EXTENDED RELEASE ORAL at 06:40

## 2021-09-20 RX ADMIN — HEPARIN SODIUM 4100 UNIT(S): 5000 INJECTION INTRAVENOUS; SUBCUTANEOUS at 14:44

## 2021-09-20 RX ADMIN — Medication 40 MILLIEQUIVALENT(S): at 09:15

## 2021-09-20 RX ADMIN — Medication 1 TABLET(S): at 09:15

## 2021-09-20 RX ADMIN — CLOPIDOGREL BISULFATE 75 MILLIGRAM(S): 75 TABLET, FILM COATED ORAL at 09:15

## 2021-09-20 NOTE — PROGRESS NOTE ADULT - SUBJECTIVE AND OBJECTIVE BOX
Patient is a 55y old  Male who presents with a chief complaint of Abdominal pain.       HPI:  54 yo Male presented with upper abdominal pain, gradually worsening over the past week. Patient states he has also has associated nausea, vomiting and occasional loose stools. Abdominal pain is mainly in the epigastric area, no radiation of the pain. States symptoms seem similar to prior alcoholic pancreatitis years ago. No past abdominal surgical Hx. He is a +Chronic Drinker, had 3-4 drinks today. Occasional+Marijuana use also. Denies any chest pain, sob, dizziness or headache.   -   Pt seen this am.  He denies any CP or SOB.   - pt seen this am.  He denies any symptoms.  Overnight no chest pain per staff.  No DT.       PAST MEDICAL & SURGICAL HISTORY:  Mastocytosis    Pancreatitis    Opioid dependence    No significant past surgical history        MEDICATIONS  (STANDING):  aspirin enteric coated 81 milliGRAM(s) Oral daily  atorvastatin 80 milliGRAM(s) Oral at bedtime  clopidogrel Tablet 75 milliGRAM(s) Oral daily  folic acid 1 milliGRAM(s) Oral daily  heparin  Infusion.  Unit(s)/Hr (8 mL/Hr) IV Continuous <Continuous>  lisinopril 5 milliGRAM(s) Oral daily  LORazepam     Tablet   Oral   LORazepam     Tablet 2 milliGRAM(s) Oral every 4 hours  LORazepam     Tablet 1.5 milliGRAM(s) Oral every 4 hours  multivitamin 1 Tablet(s) Oral daily  sodium chloride 0.9%. 1000 milliLiter(s) (100 mL/Hr) IV Continuous <Continuous>  thiamine 100 milliGRAM(s) Oral daily    MEDICATIONS  (PRN):  acetaminophen   Tablet .. 650 milliGRAM(s) Oral every 6 hours PRN Mild Pain (1 - 3)  heparin   Injectable 4100 Unit(s) IV Push every 6 hours PRN For aPTT less than 40  LORazepam   Injectable 2 milliGRAM(s) IV Push every 1 hour PRN Symptom-triggered: each CIWA -Ar score 8 or GREATER  morphine  - Injectable 4 milliGRAM(s) IV Push every 4 hours PRN Severe Pain (7 - 10)  ondansetron Injectable 4 milliGRAM(s) IV Push every 6 hours PRN Nausea and/or Vomiting      FAMILY HISTORY:  No pertinent family history in first degree relatives        SOCIAL HISTORY:    REVIEW OF SYSTEMS:  CONSTITUTIONAL:    No fatigue, malaise, lethargy.  No fever or chills.     RESPIRATORY:  No cough.  No wheeze.  No hemoptysis.  No shortness of breath.  CARDIOVASCULAR:  No chest pains.  No palpitations. No shortness of breath, No orthopnea or PND.  GASTROINTESTINAL:  c/o abdominal pain.  No nausea or vomiting.    GENITOURINARY:    No hematuria.    MUSCULOSKELETAL:  No musculoskeletal pain.  No joint swelling.  No arthritis.  NEUROLOGICAL:  No tingling or numbness or weakness.  PSYCHIATRIC:  No confusion  SKIN:  No rashes.        ICU Vital Signs Last 24 Hrs  T(C): 37.6 (20 Sep 2021 05:00), Max: 37.8 (19 Sep 2021 10:32)  T(F): 99.6 (20 Sep 2021 05:00), Max: 100.1 (19 Sep 2021 10:32)  HR: 85 (20 Sep 2021 07:00) (76 - 88)  BP: 104/63 (20 Sep 2021 07:00) (90/46 - 139/86)  BP(mean): 73 (20 Sep 2021 07:00) (54 - 104)  ABP: --  ABP(mean): --  RR: 16 (20 Sep 2021 07:00) (15 - 22)  SpO2: 96% (20 Sep 2021 07:00) (95% - 100%)    PHYSICAL EXAM-    Constitutional: no acute distress     Head: Head is normocephalic and atraumatic.      Neck: No JVD.     Cardiovascular: Regular rate and rhythm without S3, S4. No murmurs or rubs are appreciated.      Respiratory: Breath sounds are normal. No rales. No wheezing.    Abdomen: Soft, nontender, nondistended with positive bowel sounds.      Extremity: No tenderness. No  pitting edema     Neurologic: The patient is alert and oriented.      Skin: No rash, no obvious lesions noted.      Psychiatric: The patient appears to be emotionally stable.      INTERPRETATION OF TELEMETRY:  SR, NSVT- one with 14 beats at admission.     ECG: Sinus rythm , t wave inversion in inferior leads.     I&O's Detail      LABS:                        11.1   10.80 )-----------( 119      ( 20 Sep 2021 07:12 )             33.4     09-20    132<L>  |  102  |  10  ----------------------------<  83  3.5   |  23  |  0.49<L>    Ca    7.9<L>      20 Sep 2021 07:12  Phos  3.7       Mg     2.3         TPro  7.0  /  Alb  3.2<L>  /  TBili  0.6  /  DBili  x   /  AST  205<H>  /  ALT  49  /  AlkPhos  97      CARDIAC MARKERS ( 19 Sep 2021 10:30 )  36.700 ng/mL / x     / x     / x     / x      CARDIAC MARKERS ( 19 Sep 2021 06:50 )  43.900 ng/mL / x     / x     / x     / x      CARDIAC MARKERS ( 19 Sep 2021 01:57 )  39.700 ng/mL / x     / x     / x     / x      CARDIAC MARKERS ( 18 Sep 2021 22:23 )  15.400 ng/mL / x     / x     / x     / x      CARDIAC MARKERS ( 18 Sep 2021 18:27 )  4.610 ng/mL / x     / 256 U/L / x     / x          LIVER FUNCTIONS - ( 19 Sep 2021 06:50 )  Alb: 3.2 g/dL / Pro: 7.0 gm/dL / ALK PHOS: 97 U/L / ALT: 49 U/L / AST: 205 U/L / GGT: x           PT/INR - ( 18 Sep 2021 18:27 )   PT: 13.2 sec;   INR: 1.14 ratio         PTT - ( 20 Sep 2021 01:37 )  PTT:45.6 sec   Chol 126 LDL -- HDL 54 Trig 75        PT/INR - ( 18 Sep 2021 18:27 )   PT: 13.2 sec;   INR: 1.14 ratio         PTT - ( 19 Sep 2021 03:34 )  PTT:52.7 sec  Urinalysis Basic - ( 19 Sep 2021 04:20 )    Color: Yellow / Appearance: Clear / S.010 / pH: x  Gluc: x / Ketone: Large  / Bili: Negative / Urobili: Negative mg/dL   Blood: x / Protein: 30 mg/dL / Nitrite: Negative   Leuk Esterase: Negative / RBC: 3-5 /HPF / WBC 0-2   Sq Epi: x / Non Sq Epi: x / Bacteria: Occasional      I&O's Summary    BNPSerum Pro-Brain Natriuretic Peptide: 132 pg/mL ( @ 18:27)    RADIOLOGY & ADDITIONAL STUDIES:  < from: CT Abdomen and Pelvis w/ IV Cont (21 @ 19:49) >  EXAM:  CT ABDOMEN AND PELVIS IC                            PROCEDURE DATE:  2021          INTERPRETATION:  CLINICAL INFORMATION: Upper abdominal pain. History of pancreatitis.    COMPARISON: CT abdomen/pelvis of 2015.    CONTRAST/COMPLICATIONS:  IV Contrast: Omnipaque 350  90 cc administered   10 cc discarded  Oral Contrast: Water  Complications: None reported at time of study completion    PROCEDURE:  CT of the Abdomen and Pelvis was performed.  Arterial and Portal Venous phases were acquired.  Sagittal and coronal reformats were performed.    FINDINGS:  LOWER CHEST: Within normal limits.    LIVER: Steatosis.  BILE DUCTS: Normal caliber.  GALLBLADDER: Within normal limits.  SPLEEN: Within normal limits.  PANCREAS: Peripancreaticinflammation. Grossly homogeneous parenchymal enhancement. No discrete pancreatic ductal dilatation. No drainable fluid collection.  ADRENALS: Within normal limits.  KIDNEYS/URETERS: Punctate nonobstructing right interpole renal calculus.    BLADDER:Within normal limits.  REPRODUCTIVE ORGANS: Prostate is mildly enlarged.    BOWEL: No bowel obstruction. Appendix is normal.  PERITONEUM: No ascites.  VESSELS: Minimal atherosclerotic calcifications of the abdominal aorta.. Patent portal, splenic, and superior mesenteric veins.  RETROPERITONEUM/LYMPH NODES: No lymphadenopathy.  ABDOMINAL WALL: Tiny fat-containing right inguinal hernia.  BONES: Degenerative changes.    IMPRESSION:  Acute interstitial edematous pancreatitis.    Hepatic steatosis.        --- End of Report ---            VIRAJ GREGORY MD; Attending Radiologist  This document has been electronically signed. Sep 18 2021  8:03PM    < end of copied text >  < from: TTE Echo Complete w/o Contrast w/ Doppler (21 @ 13:03) >     Impression     Summary     The mitral valve leaflets appear thin and normal.   Trace to Mild mitral regurgitation is present.   EA reversal of the mitral inflow consistent with reduced compliance of the   left ventricle.   The aortic valve is trileaflet with thin pliable leaflets.   Normal appearing tricuspid valve structure.   Trace to Mild tricuspid valve regurgitation is present.   Pulmonic valve not well seen.   Trace pulmonic valvular regurgitation is present.   Normal appearing left atrium.   Estimated left ventricular ejection fraction is 50 %.   The left ventricle is normal in size and wall thickness.   Wall motion abnormalities are noted with preserved LV systolic function.   Normal appearing right atrium.   Normal appearing right ventricle structure and function.     Signature     ----------------------------------------------------------------   Electronically signed by Nikki Lancaster MD(Interpreting   physician) on 2021 01:57 PM    < end of copied text >

## 2021-09-20 NOTE — PROGRESS NOTE ADULT - ASSESSMENT
NSTEMI- He received plavix load.  continue ASA , heparin drip.  hold BB  Cocaine positive.  will plan on [facility] today.   Echo results as stated above.  LVEF about 50% with inferolateral hypokinesis of the LV noted.   Admit to CICU    NSVT- in the setting of NSTEMI.  EP consult recommended.  Goal k of 4 and mag of 2.     Acute pancreatitis- Management per primary team.     Tobacco and cocaine abuse- Advised strict cessation.  Explained the risks of smoking to the patient at length including but not limited to massive MI,CVA and death.  Patient expressed full understanding of this.     alcohol abuse- Management per primary team.   Monitor for DT closely.     Other medical issues- Management per primary team.   Thank you for allowing me to participate in the care of this patient. Please feel free to contact me with any questions.

## 2021-09-20 NOTE — PROGRESS NOTE ADULT - SUBJECTIVE AND OBJECTIVE BOX
56 yo Male presented with upper abdominal pain, gradually worsening over the past week. Patient states he has also has associated nausea, vomiting and occasional loose stools. Abdominal pain is mainly in the epigastric area, no radiation of the pain. States symptoms seem similar to prior alcoholic pancreatitis years ago. No past abdominal surgical Hx. He is a +Chronic Drinker, had 3-4 drinks today. Occasional+Marijuana use also. Denies any chest pain, sob, dizziness or headache.   Patient seen and evaluated. very sleepy. Notes of band like pain around the epigastric region, similar to what he had years ago. Patient also notes of chest discomfort -  this was discussed with Dr. Lemon (plan to obtain EKG /  troponins). Patient hemodynamically stable. Had brief episode of NSVT yesterday -  I suspect this is most likely ischemia related. repeated the EKG in the ED -  which show new T wave inversions in II, III, AVF.       9/20: this am UFH drip was dc due to sudden thrombocytopenia; HIT neg; pt is very somnolent    Vital Signs Last 24 Hrs  T(C): 37.4 (20 Sep 2021 08:00), Max: 37.7 (20 Sep 2021 00:00)  T(F): 99.3 (20 Sep 2021 08:00), Max: 99.9 (20 Sep 2021 00:00)  HR: 84 (20 Sep 2021 12:00) (78 - 88)  BP: 107/62 (20 Sep 2021 12:00) (90/46 - 125/76)  BP(mean): 72 (20 Sep 2021 12:00) (54 - 85)  RR: 19 (20 Sep 2021 12:00) (15 - 21)  SpO2: 96% (20 Sep 2021 12:00) (95% - 100%)      heent nc at perrla, dry oral muc NPO  NECK:  Supple without lymphadenopathy.   HEART:  Regular rate and rhythm. normal S1 and S2, No M/R/G  LUNGS:  Good ins/exp effort, no W/R/R/C  ABDOMEN:  Soft, mild epigastric tenderness nondistended with good bowel sounds heard  EXTREMITIES:  Without cyanosis, clubbing or edema.   NEUROLOGICAL:  Gross nonfocal   skin no rashes                            11.1   10.80 )-----------( 119      ( 20 Sep 2021 07:12 )             33.4   09-20    132<L>  |  102  |  10  ----------------------------<  83  3.5   |  23  |  0.49<L>    Ca    7.9<L>      20 Sep 2021 07:12  Phos  3.7     09-18  Mg     2.3     09-19    TPro  7.0  /  Alb  3.2<L>  /  TBili  0.6  /  DBili  x   /  AST  205<H>  /  ALT  49  /  AlkPhos  97  09-19          # NSTEMI  # NSVT 9/18 23/22  - patient with mild chest pain -  Dr. Lemon informed - troponins / EKG ordered, discussed with nursing  - Plavix loading 300 mg po x 1, then continue Plavix 75 mg po qdaily  - DAPT / STATIN /  Heparin drip  - LHC as per cardiology    # Acute Pancreatitis secondary to alcohol use  - CT scan: Acute interstitial edematous pancreatitis, Hepatic steatosis.  - advised to quit drinking alcohol  - IV hydration      # Alcohol dependence  # Opiod /  benzo /  THC dependance  - Alcohol withdrawl  - keep on CIWA protocol  - Ativan taper  - on folic acid, MVI, Thiamine  - fall and seizure precaution     54 yo Male presented with upper abdominal pain, gradually worsening over the past week. Patient states he has also has associated nausea, vomiting and occasional loose stools. Abdominal pain is mainly in the epigastric area, no radiation of the pain. States symptoms seem similar to prior alcoholic pancreatitis years ago. No past abdominal surgical Hx. He is a +Chronic Drinker, had 3-4 drinks today. Occasional+Marijuana use also. Denies any chest pain, sob, dizziness or headache.   Patient seen and evaluated. very sleepy. Notes of band like pain around the epigastric region, similar to what he had years ago. Patient also notes of chest discomfort -  this was discussed with Dr. Lemon (plan to obtain EKG /  troponins). Patient hemodynamically stable. Had brief episode of NSVT yesterday -  I suspect this is most likely ischemia related. repeated the EKG in the ED -  which show new T wave inversions in II, III, AVF.       9/20: this am UFH drip was dc due to sudden thrombocytopenia; HIT neg; pt is very somnolent    Vital Signs Last 24 Hrs  T(C): 37.4 (20 Sep 2021 08:00), Max: 37.7 (20 Sep 2021 00:00)  T(F): 99.3 (20 Sep 2021 08:00), Max: 99.9 (20 Sep 2021 00:00)  HR: 84 (20 Sep 2021 12:00) (78 - 88)  BP: 107/62 (20 Sep 2021 12:00) (90/46 - 125/76)  BP(mean): 72 (20 Sep 2021 12:00) (54 - 85)  RR: 19 (20 Sep 2021 12:00) (15 - 21)  SpO2: 96% (20 Sep 2021 12:00) (95% - 100%)      heent nc at perrla, dry oral muc NPO  NECK:  Supple without lymphadenopathy.   HEART:  Regular rate and rhythm. normal S1 and S2, No M/R/G  LUNGS:  Good ins/exp effort, no W/R/R/C  ABDOMEN:  Soft, mild epigastric tenderness nondistended with good bowel sounds heard  EXTREMITIES:  Without cyanosis, clubbing or edema.   NEUROLOGICAL:  Gross nonfocal   skin no rashes                            11.1   10.80 )-----------( 119      ( 20 Sep 2021 07:12 )             33.4   09-20    132<L>  |  102  |  10  ----------------------------<  83  3.5   |  23  |  0.49<L>    Ca    7.9<L>      20 Sep 2021 07:12  Phos  3.7     09-18  Mg     2.3     09-19    TPro  7.0  /  Alb  3.2<L>  /  TBili  0.6  /  DBili  x   /  AST  205<H>  /  ALT  49  /  AlkPhos  97  09-19          # NSTEMI  # NSVT 9/18 23/22  - patient with mild chest pain -  Dr. Lemon informed - troponins / EKG ordered, discussed with nursing  - Plavix loading 300 mg po x 1, then continue Plavix 75 mg po qdaily  - DAPT / STATIN /  Heparin drip  - LHC as per cardiology probably in AM    # Acute Pancreatitis secondary to alcohol use  - CT scan: Acute interstitial edematous pancreatitis, Hepatic steatosis.  - advised to quit drinking alcohol  - IV hydration      # Alcohol dependence  # Opiod /  benzo /  THC dependance  - Alcohol withdrawl  - keep on CIWA protocol dc stranding pt is too sedated  - Ativan prn  - on folic acid, MVI, Thiamine  - fall and seizure precaution     56 yo Male presented with upper abdominal pain, gradually worsening over the past week. Patient states he has also has associated nausea, vomiting and occasional loose stools. Abdominal pain is mainly in the epigastric area, no radiation of the pain. States symptoms seem similar to prior alcoholic pancreatitis years ago. No past abdominal surgical Hx. He is a +Chronic Drinker, had 3-4 drinks today. Occasional+Marijuana use also. Denies any chest pain, sob, dizziness or headache.   Patient seen and evaluated. very sleepy. Notes of band like pain around the epigastric region, similar to what he had years ago. Patient also notes of chest discomfort -  this was discussed with Dr. Lemon (plan to obtain EKG /  troponins). Patient hemodynamically stable. Had brief episode of NSVT yesterday -  I suspect this is most likely ischemia related. repeated the EKG in the ED -  which show new T wave inversions in II, III, AVF.       9/20: this am UFH drip was dc due to sudden thrombocytopenia; HIT neg; pt is very somnolent    Vital Signs Last 24 Hrs  T(C): 37.4 (20 Sep 2021 08:00), Max: 37.7 (20 Sep 2021 00:00)  T(F): 99.3 (20 Sep 2021 08:00), Max: 99.9 (20 Sep 2021 00:00)  HR: 84 (20 Sep 2021 12:00) (78 - 88)  BP: 107/62 (20 Sep 2021 12:00) (90/46 - 125/76)  BP(mean): 72 (20 Sep 2021 12:00) (54 - 85)  RR: 19 (20 Sep 2021 12:00) (15 - 21)  SpO2: 96% (20 Sep 2021 12:00) (95% - 100%)      heent nc at perrla, dry oral muc NPO  NECK:  Supple without lymphadenopathy.   HEART:  Regular rate and rhythm. normal S1 and S2, No M/R/G  LUNGS:  Good ins/exp effort, no W/R/R/C  ABDOMEN:  Soft, mild epigastric tenderness nondistended with good bowel sounds heard  EXTREMITIES:  Without cyanosis, clubbing or edema.   NEUROLOGICAL:  Gross nonfocal   skin no rashes                            11.1   10.80 )-----------( 119      ( 20 Sep 2021 07:12 )             33.4   09-20    132<L>  |  102  |  10  ----------------------------<  83  3.5   |  23  |  0.49<L>    Ca    7.9<L>      20 Sep 2021 07:12  Phos  3.7     09-18  Mg     2.3     09-19    TPro  7.0  /  Alb  3.2<L>  /  TBili  0.6  /  DBili  x   /  AST  205<H>  /  ALT  49  /  AlkPhos  97  09-19          # NSTEMI  # NSVT 9/18 23/22  - patient with mild chest pain -  Dr. Lemon informed - troponins / EKG ordered, discussed with nursing  - Plavix loading 300 mg po x 1, then continue Plavix 75 mg po qdaily  - DAPT / STATIN /  Heparin drip  - LHC as per cardiology probably in AM    # Acute Pancreatitis secondary to alcohol use  - CT scan: Acute interstitial edematous pancreatitis, Hepatic steatosis.  - advised to quit drinking alcohol  - IV hydration      # Alcohol dependence  # Opiod /  benzo /  THC dependance  - Alcohol withdrawl  - keep on CIWA protocol dc stranding pt is too sedated  - Ativan prn  - on folic acid, MVI, Thiamine  - fall and seizure precaution    Case d/w nancy Oseguera [phone], requesting Psych; pt is depressed

## 2021-09-20 NOTE — INPATIENT CERTIFICATION FOR MEDICARE PATIENTS - THE STATUS OF COMORBIDITIES.
2. The status of comorbities. (See ED/admit documents) Graft Donor Site Bandage (Optional-Leave Blank If You Don't Want In Note): Steri-strips and a pressure bandage were applied to the donor site.

## 2021-09-21 DIAGNOSIS — F19.94 OTHER PSYCHOACTIVE SUBSTANCE USE, UNSPECIFIED WITH PSYCHOACTIVE SUBSTANCE-INDUCED MOOD DISORDER: ICD-10-CM

## 2021-09-21 DIAGNOSIS — F10.10 ALCOHOL ABUSE, UNCOMPLICATED: ICD-10-CM

## 2021-09-21 DIAGNOSIS — F32.9 MAJOR DEPRESSIVE DISORDER, SINGLE EPISODE, UNSPECIFIED: ICD-10-CM

## 2021-09-21 LAB
ANION GAP SERPL CALC-SCNC: 11 MMOL/L — SIGNIFICANT CHANGE UP (ref 5–17)
APTT BLD: 38.5 SEC — HIGH (ref 27.5–35.5)
APTT BLD: 39.5 SEC — HIGH (ref 27.5–35.5)
APTT BLD: 57.1 SEC — HIGH (ref 27.5–35.5)
BUN SERPL-MCNC: 9 MG/DL — SIGNIFICANT CHANGE UP (ref 7–23)
CALCIUM SERPL-MCNC: 8.3 MG/DL — LOW (ref 8.5–10.1)
CHLORIDE SERPL-SCNC: 98 MMOL/L — SIGNIFICANT CHANGE UP (ref 96–108)
CO2 SERPL-SCNC: 21 MMOL/L — LOW (ref 22–31)
CREAT SERPL-MCNC: 0.47 MG/DL — LOW (ref 0.5–1.3)
GLUCOSE SERPL-MCNC: 64 MG/DL — LOW (ref 70–99)
HCT VFR BLD CALC: 33.1 % — LOW (ref 39–50)
HGB BLD-MCNC: 11.3 G/DL — LOW (ref 13–17)
MAGNESIUM SERPL-MCNC: 2 MG/DL — SIGNIFICANT CHANGE UP (ref 1.6–2.6)
MCHC RBC-ENTMCNC: 27.5 PG — SIGNIFICANT CHANGE UP (ref 27–34)
MCHC RBC-ENTMCNC: 34.1 GM/DL — SIGNIFICANT CHANGE UP (ref 32–36)
MCV RBC AUTO: 80.5 FL — SIGNIFICANT CHANGE UP (ref 80–100)
PHOSPHATE SERPL-MCNC: 1.9 MG/DL — LOW (ref 2.5–4.5)
PLATELET # BLD AUTO: 108 K/UL — LOW (ref 150–400)
POTASSIUM SERPL-MCNC: 3.8 MMOL/L — SIGNIFICANT CHANGE UP (ref 3.5–5.3)
POTASSIUM SERPL-SCNC: 3.8 MMOL/L — SIGNIFICANT CHANGE UP (ref 3.5–5.3)
RBC # BLD: 4.11 M/UL — LOW (ref 4.2–5.8)
RBC # FLD: 14.3 % — SIGNIFICANT CHANGE UP (ref 10.3–14.5)
SODIUM SERPL-SCNC: 130 MMOL/L — LOW (ref 135–145)
WBC # BLD: 10.92 K/UL — HIGH (ref 3.8–10.5)
WBC # FLD AUTO: 10.92 K/UL — HIGH (ref 3.8–10.5)

## 2021-09-21 PROCEDURE — 99233 SBSQ HOSP IP/OBS HIGH 50: CPT

## 2021-09-21 PROCEDURE — 90792 PSYCH DIAG EVAL W/MED SRVCS: CPT

## 2021-09-21 RX ORDER — NICOTINE POLACRILEX 2 MG
1 GUM BUCCAL DAILY
Refills: 0 | Status: DISCONTINUED | OUTPATIENT
Start: 2021-09-21 | End: 2021-10-01

## 2021-09-21 RX ADMIN — Medication 1 TABLET(S): at 09:26

## 2021-09-21 RX ADMIN — Medication 1 PATCH: at 21:20

## 2021-09-21 RX ADMIN — Medication 2 MILLIGRAM(S): at 12:38

## 2021-09-21 RX ADMIN — CLOPIDOGREL BISULFATE 75 MILLIGRAM(S): 75 TABLET, FILM COATED ORAL at 09:26

## 2021-09-21 RX ADMIN — Medication 2 MILLIGRAM(S): at 14:45

## 2021-09-21 RX ADMIN — Medication 2 MILLIGRAM(S): at 02:03

## 2021-09-21 RX ADMIN — MORPHINE SULFATE 4 MILLIGRAM(S): 50 CAPSULE, EXTENDED RELEASE ORAL at 13:14

## 2021-09-21 RX ADMIN — Medication 2 MILLIGRAM(S): at 09:26

## 2021-09-21 RX ADMIN — Medication 1 MILLIGRAM(S): at 20:02

## 2021-09-21 RX ADMIN — Medication 2 MILLIGRAM(S): at 22:40

## 2021-09-21 RX ADMIN — MORPHINE SULFATE 4 MILLIGRAM(S): 50 CAPSULE, EXTENDED RELEASE ORAL at 04:30

## 2021-09-21 RX ADMIN — Medication 1 PATCH: at 18:18

## 2021-09-21 RX ADMIN — Medication 2 MILLIGRAM(S): at 23:45

## 2021-09-21 RX ADMIN — Medication 1 MILLIGRAM(S): at 16:01

## 2021-09-21 RX ADMIN — HEPARIN SODIUM 1300 UNIT(S)/HR: 5000 INJECTION INTRAVENOUS; SUBCUTANEOUS at 12:33

## 2021-09-21 RX ADMIN — Medication 81 MILLIGRAM(S): at 09:26

## 2021-09-21 RX ADMIN — Medication 2 MILLIGRAM(S): at 13:43

## 2021-09-21 RX ADMIN — Medication 1.5 MILLIGRAM(S): at 18:15

## 2021-09-21 RX ADMIN — HEPARIN SODIUM 1300 UNIT(S)/HR: 5000 INJECTION INTRAVENOUS; SUBCUTANEOUS at 20:03

## 2021-09-21 RX ADMIN — SODIUM CHLORIDE 100 MILLILITER(S): 9 INJECTION INTRAMUSCULAR; INTRAVENOUS; SUBCUTANEOUS at 19:59

## 2021-09-21 RX ADMIN — MORPHINE SULFATE 4 MILLIGRAM(S): 50 CAPSULE, EXTENDED RELEASE ORAL at 00:45

## 2021-09-21 RX ADMIN — Medication 2 MILLIGRAM(S): at 18:00

## 2021-09-21 RX ADMIN — Medication 1 MILLIGRAM(S): at 09:26

## 2021-09-21 RX ADMIN — MORPHINE SULFATE 4 MILLIGRAM(S): 50 CAPSULE, EXTENDED RELEASE ORAL at 17:34

## 2021-09-21 RX ADMIN — HEPARIN SODIUM 1100 UNIT(S)/HR: 5000 INJECTION INTRAVENOUS; SUBCUTANEOUS at 05:05

## 2021-09-21 RX ADMIN — HEPARIN SODIUM 4100 UNIT(S): 5000 INJECTION INTRAVENOUS; SUBCUTANEOUS at 12:38

## 2021-09-21 RX ADMIN — Medication 1.5 MILLIGRAM(S): at 21:30

## 2021-09-21 RX ADMIN — Medication 2 MILLIGRAM(S): at 06:34

## 2021-09-21 RX ADMIN — Medication 1.5 MILLIGRAM(S): at 14:59

## 2021-09-21 RX ADMIN — Medication 2 MILLIGRAM(S): at 19:30

## 2021-09-21 RX ADMIN — Medication 2 MILLIGRAM(S): at 17:00

## 2021-09-21 RX ADMIN — MORPHINE SULFATE 4 MILLIGRAM(S): 50 CAPSULE, EXTENDED RELEASE ORAL at 21:37

## 2021-09-21 RX ADMIN — MORPHINE SULFATE 4 MILLIGRAM(S): 50 CAPSULE, EXTENDED RELEASE ORAL at 00:19

## 2021-09-21 RX ADMIN — MORPHINE SULFATE 4 MILLIGRAM(S): 50 CAPSULE, EXTENDED RELEASE ORAL at 04:31

## 2021-09-21 RX ADMIN — MORPHINE SULFATE 4 MILLIGRAM(S): 50 CAPSULE, EXTENDED RELEASE ORAL at 08:50

## 2021-09-21 RX ADMIN — Medication 2 MILLIGRAM(S): at 10:43

## 2021-09-21 RX ADMIN — ATORVASTATIN CALCIUM 80 MILLIGRAM(S): 80 TABLET, FILM COATED ORAL at 21:30

## 2021-09-21 NOTE — PROGRESS NOTE ADULT - SUBJECTIVE AND OBJECTIVE BOX
54 yo Male presented with upper abdominal pain, gradually worsening over the past week. Patient states he has also has associated nausea, vomiting and occasional loose stools. Abdominal pain is mainly in the epigastric area, no radiation of the pain. States symptoms seem similar to prior alcoholic pancreatitis years ago. No past abdominal surgical Hx. He is a +Chronic Drinker, had 3-4 drinks today. Occasional+Marijuana use also. Denies any chest pain, sob, dizziness or headache.   Patient seen and evaluated. very sleepy. Notes of band like pain around the epigastric region, similar to what he had years ago. Patient also notes of chest discomfort -  this was discussed with Dr. Lemon (plan to obtain EKG /  troponins). Patient hemodynamically stable. Had brief episode of NSVT yesterday -  I suspect this is most likely ischemia related. repeated the EKG in the ED -  which show new T wave inversions in II, III, AVF.       Stable no change in his status    Vital Signs Last 24 Hrs  T(C): 37.3 (21 Sep 2021 08:04), Max: 37.4 (20 Sep 2021 16:54)  T(F): 99.2 (21 Sep 2021 08:04), Max: 99.3 (20 Sep 2021 16:54)  HR: 87 (21 Sep 2021 06:00) (78 - 92)  BP: 118/72 (21 Sep 2021 06:00) (107/62 - 137/86)  BP(mean): 82 (21 Sep 2021 06:00) (72 - 100)  RR: 11 (21 Sep 2021 06:00) (11 - 19)  SpO2: 97% (21 Sep 2021 04:00) (95% - 99%)      heent nc at perrla, dry oral muc NPO  NECK:  Supple without lymphadenopathy.   HEART:  Regular rate and rhythm. normal S1 and S2, No M/R/G  LUNGS:  Good ins/exp effort, no W/R/R/C  ABDOMEN:  Soft, mild epigastric tenderness nondistended with good bowel sounds heard  EXTREMITIES:  Without cyanosis, clubbing or edema.   NEUROLOGICAL:  Gross nonfocal   skin no rashes                              11.1   9.43  )-----------( 112      ( 20 Sep 2021 20:57 )             33.4   09-21    130<L>  |  98  |  9   ----------------------------<  64<L>  3.8   |  21<L>  |  0.47<L>    Ca    8.3<L>      21 Sep 2021 08:06  Phos  1.9     09-21  Mg     2.0     09-21    TPro  6.4  /  Alb  2.7<L>  /  TBili  0.6  /  DBili  0.2  /  AST  86<H>  /  ALT  54  /  AlkPhos  78  09-20          # NSTEMI  # NSVT 9/18 23/22  - Plavix loading 300 mg po x 1, then continue Plavix 75 mg po qdaily  - DAPT / STATIN /  Heparin drip  - LHC as per cardiology probably in AM/ Wed    # Acute Pancreatitis secondary to alcohol use  - CT scan: Acute interstitial edematous pancreatitis, Hepatic steatosis.  - advised to quit drinking alcohol  - IV hydration  start liquids      # Alcohol dependence  # Opiod /  benzo /  THC dependance  - Alcohol withdrawal  - keep on CIWA protocol dc stranding pt is too sedated  - Ativan prn  - on folic acid, MVI, Thiamine  - fall and seizure precaution    Case d/w sister  Katrina Oseguera [phone], requesting Psych; pt is depressed

## 2021-09-21 NOTE — PROGRESS NOTE ADULT - ASSESSMENT
NSTEMI- He received plavix load.  continue dapt , heparin drip.  hold BB  Cocaine positive.  Echo results as stated above.  LVEF about 50% with inferolateral hypokinesis of the LV noted.   Interventional cardiology team deferred the cath yesterday secondary to drop in platelets.  No HIT. d/w Dr Espinosa   Will plan on [facility] tomorrow , Dr Kwok to follow.    NSVT- in the setting of NSTEMI.  Goal k of 4 and mag of 2.     Acute pancreatitis- Management per primary team.     Tobacco and cocaine abuse- Advised strict cessation.  Explained the risks of smoking to the patient at length including but not limited to massive MI,CVA and death.  Patient expressed full understanding of this.     alcohol abuse- Management per primary team.   Monitor for DT closely.     Other medical issues- Management per primary team.   Thank you for allowing me to participate in the care of this patient. Please feel free to contact me with any questions.

## 2021-09-21 NOTE — BEHAVIORAL HEALTH ASSESSMENT NOTE - NSBHCHARTREVIEWVS_PSY_A_CORE FT
Vital Signs Last 24 Hrs  T(C): 37.3 (21 Sep 2021 08:04), Max: 37.4 (20 Sep 2021 16:54)  T(F): 99.2 (21 Sep 2021 08:04), Max: 99.3 (20 Sep 2021 16:54)  HR: 87 (21 Sep 2021 06:00) (78 - 92)  BP: 118/72 (21 Sep 2021 06:00) (111/66 - 137/86)  BP(mean): 82 (21 Sep 2021 06:00) (77 - 100)  RR: 11 (21 Sep 2021 06:00) (11 - 17)  SpO2: 97% (21 Sep 2021 04:00) (95% - 99%)

## 2021-09-21 NOTE — PROGRESS NOTE ADULT - SUBJECTIVE AND OBJECTIVE BOX
Patient is a 55y old  Male who presents with a chief complaint of Abdominal pain.       HPI:  54 yo Male presented with upper abdominal pain, gradually worsening over the past week. Patient states he has also has associated nausea, vomiting and occasional loose stools. Abdominal pain is mainly in the epigastric area, no radiation of the pain. States symptoms seem similar to prior alcoholic pancreatitis years ago. No past abdominal surgical Hx. He is a +Chronic Drinker, had 3-4 drinks today. Occasional+Marijuana use also. Denies any chest pain, sob, dizziness or headache.   -   Pt seen this am.  He denies any CP or SOB.   - pt seen this am.  He denies any symptoms.  Overnight no chest pain per staff.  No DT.   - pt seen this am.     PAST MEDICAL & SURGICAL HISTORY:  Mastocytosis    Pancreatitis    Opioid dependence    No significant past surgical history        MEDICATIONS  (STANDING):  aspirin enteric coated 81 milliGRAM(s) Oral daily  atorvastatin 80 milliGRAM(s) Oral at bedtime  clopidogrel Tablet 75 milliGRAM(s) Oral daily  folic acid 1 milliGRAM(s) Oral daily  heparin  Infusion.  Unit(s)/Hr (8 mL/Hr) IV Continuous <Continuous>  lisinopril 5 milliGRAM(s) Oral daily  LORazepam     Tablet   Oral   LORazepam     Tablet 2 milliGRAM(s) Oral every 4 hours  LORazepam     Tablet 1.5 milliGRAM(s) Oral every 4 hours  multivitamin 1 Tablet(s) Oral daily  sodium chloride 0.9%. 1000 milliLiter(s) (100 mL/Hr) IV Continuous <Continuous>  thiamine 100 milliGRAM(s) Oral daily    MEDICATIONS  (PRN):  acetaminophen   Tablet .. 650 milliGRAM(s) Oral every 6 hours PRN Mild Pain (1 - 3)  heparin   Injectable 4100 Unit(s) IV Push every 6 hours PRN For aPTT less than 40  LORazepam   Injectable 2 milliGRAM(s) IV Push every 1 hour PRN Symptom-triggered: each CIWA -Ar score 8 or GREATER  morphine  - Injectable 4 milliGRAM(s) IV Push every 4 hours PRN Severe Pain (7 - 10)  ondansetron Injectable 4 milliGRAM(s) IV Push every 6 hours PRN Nausea and/or Vomiting      FAMILY HISTORY:  No pertinent family history in first degree relatives        SOCIAL HISTORY:    REVIEW OF SYSTEMS:  CONSTITUTIONAL:    No fatigue, malaise, lethargy.  No fever or chills.     RESPIRATORY:  No cough.  No wheeze.  No hemoptysis.  No shortness of breath.  CARDIOVASCULAR:  No chest pains.  No palpitations. No shortness of breath, No orthopnea or PND.  GASTROINTESTINAL:  c/o abdominal pain.  No nausea or vomiting.    GENITOURINARY:    No hematuria.    MUSCULOSKELETAL:  No musculoskeletal pain.  No joint swelling.  No arthritis.  NEUROLOGICAL:  No tingling or numbness or weakness.  PSYCHIATRIC:  No confusion  SKIN:  No rashes.        ICU Vital Signs Last 24 Hrs  T(C): 37.3 (21 Sep 2021 08:04), Max: 37.4 (20 Sep 2021 16:54)  T(F): 99.2 (21 Sep 2021 08:04), Max: 99.3 (20 Sep 2021 16:54)  HR: 87 (21 Sep 2021 06:00) (78 - 92)  BP: 118/72 (21 Sep 2021 06:00) (107/62 - 137/86)  BP(mean): 82 (21 Sep 2021 06:00) (72 - 100)  ABP: --  ABP(mean): --  RR: 11 (21 Sep 2021 06:00) (11 - 19)  SpO2: 97% (21 Sep 2021 04:00) (95% - 99%)      PHYSICAL EXAM-    Constitutional: no acute distress     Head: Head is normocephalic and atraumatic.      Neck: No JVD.     Cardiovascular: Regular rate and rhythm without S3, S4. No murmurs or rubs are appreciated.      Respiratory: Breath sounds are normal. No rales. No wheezing.    Abdomen: Soft, nontender, nondistended with positive bowel sounds.      Extremity: No tenderness. No  pitting edema     Neurologic: The patient is alert and oriented.      Skin: No rash, no obvious lesions noted.      Psychiatric: The patient appears to be emotionally stable.      INTERPRETATION OF TELEMETRY:  SR, NSVT- one with 14 beats at admission.     ECG: Sinus rythm , t wave inversion in inferior leads.     I&O's Detail      LABS:                                   11.1   9.43  )-----------( 112      ( 20 Sep 2021 20:57 )             33.4     09-21    130<L>  |  98  |  9   ----------------------------<  64<L>  3.8   |  21<L>  |  0.47<L>    Ca    8.3<L>      21 Sep 2021 08:06  Phos  1.9       Mg     2.0         TPro  6.4  /  Alb  2.7<L>  /  TBili  0.6  /  DBili  0.2  /  AST  86<H>  /  ALT  54  /  AlkPhos  78      CARDIAC MARKERS ( 19 Sep 2021 10:30 )  36.700 ng/mL / x     / x     / x     / x          LIVER FUNCTIONS - ( 20 Sep 2021 16:46 )  Alb: 2.7 g/dL / Pro: 6.4 gm/dL / ALK PHOS: 78 U/L / ALT: 54 U/L / AST: 86 U/L / GGT: x           PTT - ( 21 Sep 2021 04:18 )  PTT:39.5 sec   Chol 126 LDL -- HDL 54 Trig 75    CARDIAC MARKERS ( 18 Sep 2021 18:27 )  4.610 ng/mL / x     / 256 U/L / x     / x          LIVER FUNCTIONS - ( 19 Sep 2021 06:50 )  Alb: 3.2 g/dL / Pro: 7.0 gm/dL / ALK PHOS: 97 U/L / ALT: 49 U/L / AST: 205 U/L / GGT: x           PT/INR - ( 18 Sep 2021 18:27 )   PT: 13.2 sec;   INR: 1.14 ratio         PTT - ( 20 Sep 2021 01:37 )  PTT:45.6 sec   Chol 126 LDL -- HDL 54 Trig 75        PT/INR - ( 18 Sep 2021 18:27 )   PT: 13.2 sec;   INR: 1.14 ratio         PTT - ( 19 Sep 2021 03:34 )  PTT:52.7 sec  Urinalysis Basic - ( 19 Sep 2021 04:20 )    Color: Yellow / Appearance: Clear / S.010 / pH: x  Gluc: x / Ketone: Large  / Bili: Negative / Urobili: Negative mg/dL   Blood: x / Protein: 30 mg/dL / Nitrite: Negative   Leuk Esterase: Negative / RBC: 3-5 /HPF / WBC 0-2   Sq Epi: x / Non Sq Epi: x / Bacteria: Occasional      I&O's Summary    BNPSerum Pro-Brain Natriuretic Peptide: 132 pg/mL ( @ 18:27)    RADIOLOGY & ADDITIONAL STUDIES:  < from: CT Abdomen and Pelvis w/ IV Cont (21 @ 19:49) >  EXAM:  CT ABDOMEN AND PELVIS IC                            PROCEDURE DATE:  2021          INTERPRETATION:  CLINICAL INFORMATION: Upper abdominal pain. History of pancreatitis.    COMPARISON: CT abdomen/pelvis of 2015.    CONTRAST/COMPLICATIONS:  IV Contrast: Omnipaque 350  90 cc administered   10 cc discarded  Oral Contrast: Water  Complications: None reported at time of study completion    PROCEDURE:  CT of the Abdomen and Pelvis was performed.  Arterial and Portal Venous phases were acquired.  Sagittal and coronal reformats were performed.    FINDINGS:  LOWER CHEST: Within normal limits.    LIVER: Steatosis.  BILE DUCTS: Normal caliber.  GALLBLADDER: Within normal limits.  SPLEEN: Within normal limits.  PANCREAS: Peripancreaticinflammation. Grossly homogeneous parenchymal enhancement. No discrete pancreatic ductal dilatation. No drainable fluid collection.  ADRENALS: Within normal limits.  KIDNEYS/URETERS: Punctate nonobstructing right interpole renal calculus.    BLADDER:Within normal limits.  REPRODUCTIVE ORGANS: Prostate is mildly enlarged.    BOWEL: No bowel obstruction. Appendix is normal.  PERITONEUM: No ascites.  VESSELS: Minimal atherosclerotic calcifications of the abdominal aorta.. Patent portal, splenic, and superior mesenteric veins.  RETROPERITONEUM/LYMPH NODES: No lymphadenopathy.  ABDOMINAL WALL: Tiny fat-containing right inguinal hernia.  BONES: Degenerative changes.    IMPRESSION:  Acute interstitial edematous pancreatitis.    Hepatic steatosis.        --- End of Report ---            VIRAJ GREGORY MD; Attending Radiologist  This document has been electronically signed. Sep 18 2021  8:03PM    < end of copied text >  < from: TTE Echo Complete w/o Contrast w/ Doppler (21 @ 13:03) >     Impression     Summary     The mitral valve leaflets appear thin and normal.   Trace to Mild mitral regurgitation is present.   EA reversal of the mitral inflow consistent with reduced compliance of the   left ventricle.   The aortic valve is trileaflet with thin pliable leaflets.   Normal appearing tricuspid valve structure.   Trace to Mild tricuspid valve regurgitation is present.   Pulmonic valve not well seen.   Trace pulmonic valvular regurgitation is present.   Normal appearing left atrium.   Estimated left ventricular ejection fraction is 50 %.   The left ventricle is normal in size and wall thickness.   Wall motion abnormalities are noted with preserved LV systolic function.   Normal appearing right atrium.   Normal appearing right ventricle structure and function.     Signature     ----------------------------------------------------------------   Electronically signed by Nikki Lancaster MD(Interpreting   physician) on 2021 01:57 PM    < end of copied text >

## 2021-09-21 NOTE — BEHAVIORAL HEALTH ASSESSMENT NOTE - NSBHCHARTREVIEWINVESTIGATE_PSY_A_CORE FT
Ventricular Rate 79 BPM    Atrial Rate 79 BPM    P-R Interval 154 ms    QRS Duration 80 ms    Q-T Interval 414 ms    QTC Calculation(Bazett) 474 ms    P Axis 51 degrees    R Axis -26 degrees    T Axis -40 degrees    Diagnosis Line Normal sinus rhythm  T wave abnormality, consider inferior ischemia  Abnormal ECG

## 2021-09-21 NOTE — BEHAVIORAL HEALTH ASSESSMENT NOTE - NSBHCHARTREVIEWLAB_PSY_A_CORE FT
11.3   10.92 )-----------( 108      ( 21 Sep 2021 11:10 )             33.1   09-21    130<L>  |  98  |  9   ----------------------------<  64<L>  3.8   |  21<L>  |  0.47<L>    Ca    8.3<L>      21 Sep 2021 08:06  Phos  1.9     09-21  Mg     2.0     09-21    TPro  6.4  /  Alb  2.7<L>  /  TBili  0.6  /  DBili  0.2  /  AST  86<H>  /  ALT  54  /  AlkPhos  78  09-20

## 2021-09-21 NOTE — BEHAVIORAL HEALTH ASSESSMENT NOTE - RISK ASSESSMENT
Low acute risk: Pt is using EtOH and is depressed, but does not want to die and has no SI.   Increased long term risk: hx of SA 20 years ago, depression nad EtOH abuse Low Acute Suicide Risk

## 2021-09-21 NOTE — BEHAVIORAL HEALTH ASSESSMENT NOTE - SUMMARY
Pt is a 55 yowM with hx significant od depression and EtOH abuse, one SA 20 years ago with hospitalization in [facility] at hat time, and brief outpatient f/u.   Pt denies  being psych after that. He was admitted with upper abdominal pain, he has a hx of alcoholic pancreatitis years ago. No past abdominal surgical Hx. He is   + EtOH abuser, has 3-4 drinks QD. Occasional+Marijuana use also.  Pt states he is depressed and sometimes has insomnia, appetite is poor. He is NPO at this time. Energy is low. Pt denies current  hopelessness or SI. No HI, AH, VH, PI.    PT has plans for future and wants help. Would like to see  for EtOH once d/vincenzo.   Pt is wiling to try antidepressant.    Pt does not need inpatient psych level of care.   He is wiling to try antidepressant:  suggest Wellbutrin 75 mg in the AM.     Pt would benefit from rehab or alteratively counseling and meds management  in community  e/g,. [facility].

## 2021-09-21 NOTE — BEHAVIORAL HEALTH ASSESSMENT NOTE - HPI (INCLUDE ILLNESS QUALITY, SEVERITY, DURATION, TIMING, CONTEXT, MODIFYING FACTORS, ASSOCIATED SIGNS AND SYMPTOMS)
Pt is a 55 yowM with hx significant od depression and EtOH abuse, one SA 20 years ago with hospitalization in [facility] at hat time, and brief outpatient f/u.   Pt denies  being psych after that. He was admitted with upper abdominal pain, he has a hx of alcoholic pancreatitis years ago. No past abdominal surgical Hx. He is   + EtOH abuser, has 3-4 drinks QD. Occasional+Marijuana use also.  Pt states he is depressed and sometimes has insomnia, appetite is poor. He is NPO at this time. Energy is low. Pt denies current  hopelessness or SI. No HI, AH, VH, PI.    PT has plans for future and wants help. Would like to see  for EtOH once d/vincenzo.   Pt is wiling to try antidepressant.

## 2021-09-22 LAB
ANION GAP SERPL CALC-SCNC: 11 MMOL/L — SIGNIFICANT CHANGE UP (ref 5–17)
APTT BLD: 50.8 SEC — HIGH (ref 27.5–35.5)
APTT BLD: 57.8 SEC — HIGH (ref 27.5–35.5)
APTT BLD: 65.8 SEC — HIGH (ref 27.5–35.5)
BUN SERPL-MCNC: 6 MG/DL — LOW (ref 7–23)
CALCIUM SERPL-MCNC: 8.6 MG/DL — SIGNIFICANT CHANGE UP (ref 8.5–10.1)
CHLORIDE SERPL-SCNC: 97 MMOL/L — SIGNIFICANT CHANGE UP (ref 96–108)
CO2 SERPL-SCNC: 21 MMOL/L — LOW (ref 22–31)
CREAT SERPL-MCNC: 0.47 MG/DL — LOW (ref 0.5–1.3)
GLUCOSE SERPL-MCNC: 86 MG/DL — SIGNIFICANT CHANGE UP (ref 70–99)
HCT VFR BLD CALC: 32.2 % — LOW (ref 39–50)
HGB BLD-MCNC: 11 G/DL — LOW (ref 13–17)
MCHC RBC-ENTMCNC: 27.4 PG — SIGNIFICANT CHANGE UP (ref 27–34)
MCHC RBC-ENTMCNC: 34.2 GM/DL — SIGNIFICANT CHANGE UP (ref 32–36)
MCV RBC AUTO: 80.3 FL — SIGNIFICANT CHANGE UP (ref 80–100)
PLATELET # BLD AUTO: 120 K/UL — LOW (ref 150–400)
POTASSIUM SERPL-MCNC: 3.5 MMOL/L — SIGNIFICANT CHANGE UP (ref 3.5–5.3)
POTASSIUM SERPL-SCNC: 3.5 MMOL/L — SIGNIFICANT CHANGE UP (ref 3.5–5.3)
RBC # BLD: 4.01 M/UL — LOW (ref 4.2–5.8)
RBC # FLD: 14.5 % — SIGNIFICANT CHANGE UP (ref 10.3–14.5)
SODIUM SERPL-SCNC: 129 MMOL/L — LOW (ref 135–145)
WBC # BLD: 8.87 K/UL — SIGNIFICANT CHANGE UP (ref 3.8–10.5)
WBC # FLD AUTO: 8.87 K/UL — SIGNIFICANT CHANGE UP (ref 3.8–10.5)

## 2021-09-22 PROCEDURE — 99233 SBSQ HOSP IP/OBS HIGH 50: CPT

## 2021-09-22 PROCEDURE — 99231 SBSQ HOSP IP/OBS SF/LOW 25: CPT

## 2021-09-22 RX ORDER — BUPROPION HYDROCHLORIDE 150 MG/1
150 TABLET, EXTENDED RELEASE ORAL DAILY
Refills: 0 | Status: DISCONTINUED | OUTPATIENT
Start: 2021-09-22 | End: 2021-10-01

## 2021-09-22 RX ADMIN — Medication 1 PATCH: at 09:59

## 2021-09-22 RX ADMIN — Medication 1.5 MILLIGRAM(S): at 05:04

## 2021-09-22 RX ADMIN — Medication 2 MILLIGRAM(S): at 17:55

## 2021-09-22 RX ADMIN — BUPROPION HYDROCHLORIDE 150 MILLIGRAM(S): 150 TABLET, EXTENDED RELEASE ORAL at 13:41

## 2021-09-22 RX ADMIN — Medication 81 MILLIGRAM(S): at 10:00

## 2021-09-22 RX ADMIN — Medication 2 MILLIGRAM(S): at 22:35

## 2021-09-22 RX ADMIN — HEPARIN SODIUM 1450 UNIT(S)/HR: 5000 INJECTION INTRAVENOUS; SUBCUTANEOUS at 08:45

## 2021-09-22 RX ADMIN — Medication 1 MILLIGRAM(S): at 17:42

## 2021-09-22 RX ADMIN — MORPHINE SULFATE 4 MILLIGRAM(S): 50 CAPSULE, EXTENDED RELEASE ORAL at 20:30

## 2021-09-22 RX ADMIN — Medication 1 TABLET(S): at 09:59

## 2021-09-22 RX ADMIN — Medication 1.5 MILLIGRAM(S): at 02:02

## 2021-09-22 RX ADMIN — Medication 2 MILLIGRAM(S): at 04:58

## 2021-09-22 RX ADMIN — HEPARIN SODIUM 1450 UNIT(S)/HR: 5000 INJECTION INTRAVENOUS; SUBCUTANEOUS at 02:47

## 2021-09-22 RX ADMIN — Medication 1 PATCH: at 07:43

## 2021-09-22 RX ADMIN — Medication 1 MILLIGRAM(S): at 13:31

## 2021-09-22 RX ADMIN — Medication 1 MILLIGRAM(S): at 09:59

## 2021-09-22 RX ADMIN — Medication 1.5 MILLIGRAM(S): at 10:00

## 2021-09-22 RX ADMIN — HEPARIN SODIUM 1450 UNIT(S)/HR: 5000 INJECTION INTRAVENOUS; SUBCUTANEOUS at 19:56

## 2021-09-22 RX ADMIN — ATORVASTATIN CALCIUM 80 MILLIGRAM(S): 80 TABLET, FILM COATED ORAL at 21:56

## 2021-09-22 RX ADMIN — Medication 1 MILLIGRAM(S): at 21:56

## 2021-09-22 RX ADMIN — MORPHINE SULFATE 4 MILLIGRAM(S): 50 CAPSULE, EXTENDED RELEASE ORAL at 03:25

## 2021-09-22 RX ADMIN — MORPHINE SULFATE 4 MILLIGRAM(S): 50 CAPSULE, EXTENDED RELEASE ORAL at 19:51

## 2021-09-22 RX ADMIN — Medication 2 MILLIGRAM(S): at 02:52

## 2021-09-22 RX ADMIN — Medication 2 MILLIGRAM(S): at 21:20

## 2021-09-22 RX ADMIN — MORPHINE SULFATE 4 MILLIGRAM(S): 50 CAPSULE, EXTENDED RELEASE ORAL at 08:40

## 2021-09-22 RX ADMIN — CLOPIDOGREL BISULFATE 75 MILLIGRAM(S): 75 TABLET, FILM COATED ORAL at 10:00

## 2021-09-22 RX ADMIN — Medication 1 MILLIGRAM(S): at 21:40

## 2021-09-22 RX ADMIN — Medication 2 MILLIGRAM(S): at 00:45

## 2021-09-22 RX ADMIN — SODIUM CHLORIDE 100 MILLILITER(S): 9 INJECTION INTRAMUSCULAR; INTRAVENOUS; SUBCUTANEOUS at 13:35

## 2021-09-22 RX ADMIN — Medication 1 PATCH: at 19:58

## 2021-09-22 RX ADMIN — SODIUM CHLORIDE 100 MILLILITER(S): 9 INJECTION INTRAMUSCULAR; INTRAVENOUS; SUBCUTANEOUS at 02:52

## 2021-09-22 RX ADMIN — MORPHINE SULFATE 4 MILLIGRAM(S): 50 CAPSULE, EXTENDED RELEASE ORAL at 08:17

## 2021-09-22 NOTE — PROGRESS NOTE ADULT - ASSESSMENT
NSTEMI- He received plavix load.  continue dapt , heparin drip.  hold BB  Cocaine positive.  Echo results as stated above.  LVEF about 50% with inferolateral hypokinesis of the LV noted.   Interventional cardiology team deferred the cath yesterday secondary to drop in platelets.  No HIT. d/w Dr Espinosa   NSVT- in the setting of NSTEMI.  Goal k of 4 and mag of 2.   Acute pancreatitis- Management per primary team.   Secondary to DT will still hold [facility] for now    Other medical issues- Management per primary team.   Thank you for allowing me to participate in the care of this patient. Please feel free to contact me with any questions.

## 2021-09-22 NOTE — PROGRESS NOTE ADULT - SUBJECTIVE AND OBJECTIVE BOX
56 yo Male presented with upper abdominal pain, gradually worsening over the past week. Patient states he has also has associated nausea, vomiting and occasional loose stools. Abdominal pain is mainly in the epigastric area, no radiation of the pain. States symptoms seem similar to prior alcoholic pancreatitis years ago. No past abdominal surgical Hx. He is a +Chronic Drinker, had 3-4 drinks today. Occasional+Marijuana use also. Denies any chest pain, sob, dizziness or headache.   Patient seen and evaluated. very sleepy. Notes of band like pain around the epigastric region, similar to what he had years ago. Patient also notes of chest discomfort -  this was discussed with Dr. Lemon (plan to obtain EKG /  troponins). Patient hemodynamically stable. Had brief episode of NSVT yesterday -  I suspect this is most likely ischemia related. repeated the EKG in the ED -  which show new T wave inversions in II, III, AVF.       Stable no change in his status  c/w WD; on CIWA protocol    Vital Signs Last 24 Hrs  T(C): 37.4 (22 Sep 2021 07:00), Max: 37.4 (22 Sep 2021 07:00)  T(F): 99.3 (22 Sep 2021 07:00), Max: 99.3 (22 Sep 2021 07:00)  HR: 57 (22 Sep 2021 10:00) (57 - 104)  BP: 116/71 (22 Sep 2021 10:00) (92/61 - 149/94)  BP(mean): 82 (22 Sep 2021 10:00) (66 - 108)  RR: 21 (22 Sep 2021 10:00) (9 - 28)  SpO2: 97% (22 Sep 2021 10:00) (95% - 100%)      heent nc at perrla, dry oral muc NPO  NECK:  Supple without lymphadenopathy.   HEART:  Regular rate and rhythm. normal S1 and S2, No M/R/G  LUNGS:  Good ins/exp effort, no W/R/R/C  ABDOMEN:  Soft, mild epigastric tenderness nondistended with good bowel sounds heard  EXTREMITIES:  Without cyanosis, clubbing or edema.   NEUROLOGICAL:  Gross nonfocal   skin no rashes                              11.0   8.87  )-----------( 120      ( 22 Sep 2021 06:01 )             32.2   09-22    129<L>  |  97  |  6<L>  ----------------------------<  86  3.5   |  21<L>  |  0.47<L>    Ca    8.6      22 Sep 2021 06:01  Phos  1.9     09-21  Mg     2.0     09-21    TPro  6.4  /  Alb  2.7<L>  /  TBili  0.6  /  DBili  0.2  /  AST  86<H>  /  ALT  54  /  AlkPhos  78  09-20      MEDICATIONS  (STANDING):  aspirin enteric coated 81 milliGRAM(s) Oral daily  atorvastatin 80 milliGRAM(s) Oral at bedtime  buPROPion XL (24-Hour) Oral Tab/Cap - Peds 150 milliGRAM(s) Oral daily  clopidogrel Tablet 75 milliGRAM(s) Oral daily  folic acid 1 milliGRAM(s) Oral daily  heparin  Infusion.  Unit(s)/Hr (8 mL/Hr) IV Continuous <Continuous>  LORazepam     Tablet   Oral   LORazepam     Tablet 1 milliGRAM(s) Oral every 4 hours  multivitamin 1 Tablet(s) Oral daily  nicotine -  14 mG/24Hr(s) Patch 1 patch Transdermal daily  sodium chloride 0.9%. 1000 milliLiter(s) (100 mL/Hr) IV Continuous <Continuous>        # NSTEMI  # NSVT 9/18 23/22  - Plavix loading 300 mg po x 1, then continue Plavix 75 mg po qdaily  - DAPT / STATIN /  Heparin drip  - LHC when WD resolved    # Acute Pancreatitis secondary to alcohol use  - CT scan: Acute interstitial edematous pancreatitis, Hepatic steatosis.  - advised to quit drinking alcohol  - IV hydration  advance diet      # Alcohol dependence  # Opiod /  benzo /  THC dependance  - Alcohol withdrawal  - keep on CIWA protocol dc stranding pt is too sedated  - Ativan prn  - on folic acid, MVI, Thiamine  - fall and seizure precaution  Psych f/up    Case d/w sister  Katrina Oseguera [phone], requesting Psych; pt is depressed

## 2021-09-22 NOTE — PROGRESS NOTE ADULT - SUBJECTIVE AND OBJECTIVE BOX
Patient is a 55y old  Male who presents with a chief complaint of Abdominal pain.       HPI:  54 yo Male presented with upper abdominal pain, gradually worsening over the past week. Patient states he has also has associated nausea, vomiting and occasional loose stools. Abdominal pain is mainly in the epigastric area, no radiation of the pain. States symptoms seem similar to prior alcoholic pancreatitis years ago. No past abdominal surgical Hx. He is a +Chronic Drinker, had 3-4 drinks today. Occasional+Marijuana use also. Denies any chest pain, sob, dizziness or headache.   -   Pt seen this am.  He denies any CP or SOB.   - pt seen this am.  He denies any symptoms.  Overnight no chest pain per staff.  No DT.   - pt seen this am.      Examined at [facility], on DT 30 mg of Ativan given, hyponatremic    PAST MEDICAL & SURGICAL HISTORY:  Mastocytosis    Pancreatitis    Opioid dependence    No significant past surgical history        MEDICATIONS  (STANDING):  aspirin enteric coated 81 milliGRAM(s) Oral daily  atorvastatin 80 milliGRAM(s) Oral at bedtime  clopidogrel Tablet 75 milliGRAM(s) Oral daily  folic acid 1 milliGRAM(s) Oral daily  heparin  Infusion.  Unit(s)/Hr (8 mL/Hr) IV Continuous <Continuous>  lisinopril 5 milliGRAM(s) Oral daily  LORazepam     Tablet   Oral   LORazepam     Tablet 2 milliGRAM(s) Oral every 4 hours  LORazepam     Tablet 1.5 milliGRAM(s) Oral every 4 hours  multivitamin 1 Tablet(s) Oral daily  sodium chloride 0.9%. 1000 milliLiter(s) (100 mL/Hr) IV Continuous <Continuous>  thiamine 100 milliGRAM(s) Oral daily    MEDICATIONS  (PRN):  acetaminophen   Tablet .. 650 milliGRAM(s) Oral every 6 hours PRN Mild Pain (1 - 3)  heparin   Injectable 4100 Unit(s) IV Push every 6 hours PRN For aPTT less than 40  LORazepam   Injectable 2 milliGRAM(s) IV Push every 1 hour PRN Symptom-triggered: each CIWA -Ar score 8 or GREATER  morphine  - Injectable 4 milliGRAM(s) IV Push every 4 hours PRN Severe Pain (7 - 10)  ondansetron Injectable 4 milliGRAM(s) IV Push every 6 hours PRN Nausea and/or Vomiting      FAMILY HISTORY:  No pertinent family history in first degree relatives        SOCIAL HISTORY:    REVIEW OF SYSTEMS:  CONSTITUTIONAL:    No fatigue, malaise, lethargy.  No fever or chills.     RESPIRATORY:  No cough.  No wheeze.  No hemoptysis.  No shortness of breath.  CARDIOVASCULAR:  No chest pains.  No palpitations. No shortness of breath, No orthopnea or PND.  GASTROINTESTINAL:  c/o abdominal pain.  No nausea or vomiting.    GENITOURINARY:    No hematuria.    MUSCULOSKELETAL:  No musculoskeletal pain.  No joint swelling.  No arthritis.  NEUROLOGICAL:  No tingling or numbness or weakness.  PSYCHIATRIC:  No confusion  SKIN:  No rashes.        ICU Vital Signs Last 24 Hrs  T(C): 37.3 (21 Sep 2021 08:04), Max: 37.4 (20 Sep 2021 16:54)  T(F): 99.2 (21 Sep 2021 08:04), Max: 99.3 (20 Sep 2021 16:54)  HR: 87 (21 Sep 2021 06:00) (78 - 92)  BP: 118/72 (21 Sep 2021 06:00) (107/62 - 137/86)  BP(mean): 82 (21 Sep 2021 06:00) (72 - 100)  ABP: --  ABP(mean): --  RR: 11 (21 Sep 2021 06:00) (11 - 19)  SpO2: 97% (21 Sep 2021 04:00) (95% - 99%)      PHYSICAL EXAM-    Constitutional: no acute distress     Head: Head is normocephalic and atraumatic.      Neck: No JVD.     Cardiovascular: Regular rate and rhythm without S3, S4. No murmurs or rubs are appreciated.      Respiratory: Breath sounds are normal. No rales. No wheezing.    Abdomen: Soft, nontender, nondistended with positive bowel sounds.      Extremity: No tenderness. No  pitting edema     Neurologic: The patient is alert and oriented.      Skin: No rash, no obvious lesions noted.      Psychiatric: The patient appears to be emotionally stable.      INTERPRETATION OF TELEMETRY:  SR, NSVT- one with 14 beats at admission.     ECG: Sinus rythm , t wave inversion in inferior leads.     I&O's Detail      LABS:                                   11.1   9.43  )-----------( 112      ( 20 Sep 2021 20:57 )             33.4     09-21    130<L>  |  98  |  9   ----------------------------<  64<L>  3.8   |  21<L>  |  0.47<L>    Ca    8.3<L>      21 Sep 2021 08:06  Phos  1.9       Mg     2.0         TPro  6.4  /  Alb  2.7<L>  /  TBili  0.6  /  DBili  0.2  /  AST  86<H>  /  ALT  54  /  AlkPhos  78      CARDIAC MARKERS ( 19 Sep 2021 10:30 )  36.700 ng/mL / x     / x     / x     / x          LIVER FUNCTIONS - ( 20 Sep 2021 16:46 )  Alb: 2.7 g/dL / Pro: 6.4 gm/dL / ALK PHOS: 78 U/L / ALT: 54 U/L / AST: 86 U/L / GGT: x           PTT - ( 21 Sep 2021 04:18 )  PTT:39.5 sec   Chol 126 LDL -- HDL 54 Trig 75    CARDIAC MARKERS ( 18 Sep 2021 18:27 )  4.610 ng/mL / x     / 256 U/L / x     / x          LIVER FUNCTIONS - ( 19 Sep 2021 06:50 )  Alb: 3.2 g/dL / Pro: 7.0 gm/dL / ALK PHOS: 97 U/L / ALT: 49 U/L / AST: 205 U/L / GGT: x           PT/INR - ( 18 Sep 2021 18:27 )   PT: 13.2 sec;   INR: 1.14 ratio         PTT - ( 20 Sep 2021 01:37 )  PTT:45.6 sec   Chol 126 LDL -- HDL 54 Trig 75        PT/INR - ( 18 Sep 2021 18:27 )   PT: 13.2 sec;   INR: 1.14 ratio         PTT - ( 19 Sep 2021 03:34 )  PTT:52.7 sec  Urinalysis Basic - ( 19 Sep 2021 04:20 )    Color: Yellow / Appearance: Clear / S.010 / pH: x  Gluc: x / Ketone: Large  / Bili: Negative / Urobili: Negative mg/dL   Blood: x / Protein: 30 mg/dL / Nitrite: Negative   Leuk Esterase: Negative / RBC: 3-5 /HPF / WBC 0-2   Sq Epi: x / Non Sq Epi: x / Bacteria: Occasional      I&O's Summary    BNPSerum Pro-Brain Natriuretic Peptide: 132 pg/mL ( @ 18:27)    RADIOLOGY & ADDITIONAL STUDIES:  < from: CT Abdomen and Pelvis w/ IV Cont (21 @ 19:49) >  EXAM:  CT ABDOMEN AND PELVIS IC                            PROCEDURE DATE:  2021          INTERPRETATION:  CLINICAL INFORMATION: Upper abdominal pain. History of pancreatitis.    COMPARISON: CT abdomen/pelvis of 2015.    CONTRAST/COMPLICATIONS:  IV Contrast: Omnipaque 350  90 cc administered   10 cc discarded  Oral Contrast: Water  Complications: None reported at time of study completion    PROCEDURE:  CT of the Abdomen and Pelvis was performed.  Arterial and Portal Venous phases were acquired.  Sagittal and coronal reformats were performed.    FINDINGS:  LOWER CHEST: Within normal limits.    LIVER: Steatosis.  BILE DUCTS: Normal caliber.  GALLBLADDER: Within normal limits.  SPLEEN: Within normal limits.  PANCREAS: Peripancreaticinflammation. Grossly homogeneous parenchymal enhancement. No discrete pancreatic ductal dilatation. No drainable fluid collection.  ADRENALS: Within normal limits.  KIDNEYS/URETERS: Punctate nonobstructing right interpole renal calculus.    BLADDER:Within normal limits.  REPRODUCTIVE ORGANS: Prostate is mildly enlarged.    BOWEL: No bowel obstruction. Appendix is normal.  PERITONEUM: No ascites.  VESSELS: Minimal atherosclerotic calcifications of the abdominal aorta.. Patent portal, splenic, and superior mesenteric veins.  RETROPERITONEUM/LYMPH NODES: No lymphadenopathy.  ABDOMINAL WALL: Tiny fat-containing right inguinal hernia.  BONES: Degenerative changes.    IMPRESSION:  Acute interstitial edematous pancreatitis.    Hepatic steatosis.        --- End of Report ---            VIRAJ GREGORY MD; Attending Radiologist  This document has been electronically signed. Sep 18 2021  8:03PM    < end of copied text >  < from: TTE Echo Complete w/o Contrast w/ Doppler (21 @ 13:03) >     Impression     Summary     The mitral valve leaflets appear thin and normal.   Trace to Mild mitral regurgitation is present.   EA reversal of the mitral inflow consistent with reduced compliance of the   left ventricle.   The aortic valve is trileaflet with thin pliable leaflets.   Normal appearing tricuspid valve structure.   Trace to Mild tricuspid valve regurgitation is present.   Pulmonic valve not well seen.   Trace pulmonic valvular regurgitation is present.   Normal appearing left atrium.   Estimated left ventricular ejection fraction is 50 %.   The left ventricle is normal in size and wall thickness.   Wall motion abnormalities are noted with preserved LV systolic function.   Normal appearing right atrium.   Normal appearing right ventricle structure and function.     Signature     ----------------------------------------------------------------   Electronically signed by Nikki Lancaster MD(Interpreting   physician) on 2021 01:57 PM    < end of copied text >

## 2021-09-23 LAB
APTT BLD: 62.5 SEC — HIGH (ref 27.5–35.5)
HCT VFR BLD CALC: 29 % — LOW (ref 39–50)
HGB BLD-MCNC: 9.8 G/DL — LOW (ref 13–17)
MCHC RBC-ENTMCNC: 27.4 PG — SIGNIFICANT CHANGE UP (ref 27–34)
MCHC RBC-ENTMCNC: 33.8 GM/DL — SIGNIFICANT CHANGE UP (ref 32–36)
MCV RBC AUTO: 81 FL — SIGNIFICANT CHANGE UP (ref 80–100)
PLATELET # BLD AUTO: 151 K/UL — SIGNIFICANT CHANGE UP (ref 150–400)
RBC # BLD: 3.58 M/UL — LOW (ref 4.2–5.8)
RBC # FLD: 14.7 % — HIGH (ref 10.3–14.5)
WBC # BLD: 8.19 K/UL — SIGNIFICANT CHANGE UP (ref 3.8–10.5)
WBC # FLD AUTO: 8.19 K/UL — SIGNIFICANT CHANGE UP (ref 3.8–10.5)

## 2021-09-23 PROCEDURE — 99232 SBSQ HOSP IP/OBS MODERATE 35: CPT

## 2021-09-23 RX ADMIN — MORPHINE SULFATE 4 MILLIGRAM(S): 50 CAPSULE, EXTENDED RELEASE ORAL at 07:30

## 2021-09-23 RX ADMIN — MORPHINE SULFATE 4 MILLIGRAM(S): 50 CAPSULE, EXTENDED RELEASE ORAL at 20:04

## 2021-09-23 RX ADMIN — MORPHINE SULFATE 4 MILLIGRAM(S): 50 CAPSULE, EXTENDED RELEASE ORAL at 11:07

## 2021-09-23 RX ADMIN — SODIUM CHLORIDE 100 MILLILITER(S): 9 INJECTION INTRAMUSCULAR; INTRAVENOUS; SUBCUTANEOUS at 09:50

## 2021-09-23 RX ADMIN — Medication 1 MILLIGRAM(S): at 09:48

## 2021-09-23 RX ADMIN — Medication 1 PATCH: at 09:15

## 2021-09-23 RX ADMIN — Medication 2 MILLIGRAM(S): at 05:30

## 2021-09-23 RX ADMIN — Medication 81 MILLIGRAM(S): at 09:48

## 2021-09-23 RX ADMIN — MORPHINE SULFATE 4 MILLIGRAM(S): 50 CAPSULE, EXTENDED RELEASE ORAL at 20:34

## 2021-09-23 RX ADMIN — Medication 1 PATCH: at 19:30

## 2021-09-23 RX ADMIN — ATORVASTATIN CALCIUM 80 MILLIGRAM(S): 80 TABLET, FILM COATED ORAL at 21:24

## 2021-09-23 RX ADMIN — SODIUM CHLORIDE 100 MILLILITER(S): 9 INJECTION INTRAMUSCULAR; INTRAVENOUS; SUBCUTANEOUS at 20:32

## 2021-09-23 RX ADMIN — MORPHINE SULFATE 4 MILLIGRAM(S): 50 CAPSULE, EXTENDED RELEASE ORAL at 11:20

## 2021-09-23 RX ADMIN — Medication 0.5 MILLIGRAM(S): at 21:24

## 2021-09-23 RX ADMIN — MORPHINE SULFATE 4 MILLIGRAM(S): 50 CAPSULE, EXTENDED RELEASE ORAL at 06:49

## 2021-09-23 RX ADMIN — Medication 1 PATCH: at 09:50

## 2021-09-23 RX ADMIN — Medication 1 MILLIGRAM(S): at 01:30

## 2021-09-23 RX ADMIN — Medication 1 TABLET(S): at 09:48

## 2021-09-23 RX ADMIN — Medication 1 PATCH: at 07:00

## 2021-09-23 RX ADMIN — HEPARIN SODIUM 1450 UNIT(S)/HR: 5000 INJECTION INTRAVENOUS; SUBCUTANEOUS at 09:47

## 2021-09-23 RX ADMIN — Medication 0.5 MILLIGRAM(S): at 13:24

## 2021-09-23 RX ADMIN — BUPROPION HYDROCHLORIDE 150 MILLIGRAM(S): 150 TABLET, EXTENDED RELEASE ORAL at 09:50

## 2021-09-23 RX ADMIN — Medication 0.5 MILLIGRAM(S): at 17:47

## 2021-09-23 RX ADMIN — Medication 2 MILLIGRAM(S): at 01:30

## 2021-09-23 RX ADMIN — MORPHINE SULFATE 4 MILLIGRAM(S): 50 CAPSULE, EXTENDED RELEASE ORAL at 15:16

## 2021-09-23 RX ADMIN — Medication 1 MILLIGRAM(S): at 05:36

## 2021-09-23 RX ADMIN — CLOPIDOGREL BISULFATE 75 MILLIGRAM(S): 75 TABLET, FILM COATED ORAL at 09:48

## 2021-09-23 NOTE — DIETITIAN INITIAL EVALUATION ADULT. - PERTINENT LABORATORY DATA
09-22    129<L>  |  97  |  6<L>  ----------------------------<  86  3.5   |  21<L>  |  0.47<L>    Ca    8.6      22 Sep 2021 06:01

## 2021-09-23 NOTE — DIETITIAN INITIAL EVALUATION ADULT. - PERTINENT MEDS FT
MEDICATIONS  (STANDING):  aspirin enteric coated 81 milliGRAM(s) Oral daily  atorvastatin 80 milliGRAM(s) Oral at bedtime  buPROPion XL (24-Hour) Oral Tab/Cap - Peds 150 milliGRAM(s) Oral daily  clopidogrel Tablet 75 milliGRAM(s) Oral daily  folic acid 1 milliGRAM(s) Oral daily  heparin  Infusion.  Unit(s)/Hr (8 mL/Hr) IV Continuous <Continuous>  LORazepam     Tablet   Oral   LORazepam     Tablet 0.5 milliGRAM(s) Oral every 4 hours  multivitamin 1 Tablet(s) Oral daily  nicotine -  14 mG/24Hr(s) Patch 1 patch Transdermal daily  sodium chloride 0.9%. 1000 milliLiter(s) (100 mL/Hr) IV Continuous <Continuous>    MEDICATIONS  (PRN):  acetaminophen   Tablet .. 650 milliGRAM(s) Oral every 6 hours PRN Mild Pain (1 - 3)  heparin   Injectable 4100 Unit(s) IV Push every 6 hours PRN For aPTT less than 40  LORazepam   Injectable 1 milliGRAM(s) IV Push every 2 hours PRN if he wants to leave AMA  LORazepam   Injectable 2 milliGRAM(s) IV Push every 1 hour PRN Symptom-triggered: each CIWA -Ar score 8 or GREATER  morphine  - Injectable 4 milliGRAM(s) IV Push every 4 hours PRN Severe Pain (7 - 10)  ondansetron Injectable 4 milliGRAM(s) IV Push every 6 hours PRN Nausea and/or Vomiting
None

## 2021-09-23 NOTE — PROGRESS NOTE ADULT - ASSESSMENT
NSTEMI- He received plavix load.  continue dapt.   hold BB  Cocaine positive.  Echo results as stated above.  LVEF about 50% with inferolateral hypokinesis of the LV noted.   Interventional cardiology team deferred the cath yesterday secondary to drop in platelets.  No HIT.  LHC when DT resolves.     NSVT- in the setting of NSTEMI.  Goal k of 4 and mag of 2.     Acute pancreatitis- Management per primary team.     Tobacco and cocaine abuse- Advised strict cessation.  Explained the risks of smoking to the patient at length including but not limited to massive MI,CVA and death.  Patient expressed full understanding of this.     alcohol abuse- Management per primary team.   Monitor for DT closely.     Other medical issues- Management per primary team.   Thank you for allowing me to participate in the care of this patient. Please feel free to contact me with any questions.

## 2021-09-23 NOTE — PROGRESS NOTE ADULT - SUBJECTIVE AND OBJECTIVE BOX
54 yo Male presented with upper abdominal pain, gradually worsening over the past week. Patient states he has also has associated nausea, vomiting and occasional loose stools. Abdominal pain is mainly in the epigastric area, no radiation of the pain. States symptoms seem similar to prior alcoholic pancreatitis years ago. No past abdominal surgical Hx. He is a +Chronic Drinker, had 3-4 drinks today. Occasional+Marijuana use also. Denies any chest pain, sob, dizziness or headache.   Patient seen and evaluated. very sleepy. Notes of band like pain around the epigastric region, similar to what he had years ago. Patient also notes of chest discomfort -  this was discussed with Dr. Lemon (plan to obtain EKG /  troponins). Patient hemodynamically stable. Had brief episode of NSVT yesterday -  I suspect this is most likely ischemia related. repeated the EKG in the ED -  which show new T wave inversions in II, III, AVF.       Stable no change in his status  c/w WD; on CIWA protocol    Vital Signs Last 24 Hrs  T(C): 37.4 (23 Sep 2021 06:03), Max: 37.4 (23 Sep 2021 06:03)  T(F): 99.3 (23 Sep 2021 06:03), Max: 99.3 (23 Sep 2021 06:03)  HR: 69 (23 Sep 2021 10:00) (63 - 98)  BP: 107/64 (23 Sep 2021 10:00) (86/51 - 117/89)  BP(mean): 74 (23 Sep 2021 10:00) (57 - 95)  RR: 16 (23 Sep 2021 10:00) (14 - 24)  SpO2: 98% (23 Sep 2021 10:00) (95% - 100%)    heent nc at perrla, dry oral muc NPO  NECK:  Supple without lymphadenopathy.   HEART:  Regular rate and rhythm. normal S1 and S2, No M/R/G  LUNGS:  Good ins/exp effort, no W/R/R/C  ABDOMEN:  Soft, mild epigastric tenderness nondistended with good bowel sounds heard  EXTREMITIES:  Without cyanosis, clubbing or edema.   NEUROLOGICAL:  Gross nonfocal   skin no rashes                              11.0   8.87  )-----------( 120      ( 22 Sep 2021 06:01 )             32.2   09-22    129<L>  |  97  |  6<L>  ----------------------------<  86  3.5   |  21<L>  |  0.47<L>    Ca    8.6      22 Sep 2021 06:01  Phos  1.9     09-21  Mg     2.0     09-21    TPro  6.4  /  Alb  2.7<L>  /  TBili  0.6  /  DBili  0.2  /  AST  86<H>  /  ALT  54  /  AlkPhos  78  09-20      MEDICATIONS  (STANDING):  aspirin enteric coated 81 milliGRAM(s) Oral daily  atorvastatin 80 milliGRAM(s) Oral at bedtime  buPROPion XL (24-Hour) Oral Tab/Cap - Peds 150 milliGRAM(s) Oral daily  clopidogrel Tablet 75 milliGRAM(s) Oral daily  folic acid 1 milliGRAM(s) Oral daily  heparin  Infusion.  Unit(s)/Hr (8 mL/Hr) IV Continuous <Continuous>  LORazepam     Tablet   Oral   LORazepam     Tablet 1 milliGRAM(s) Oral every 4 hours  multivitamin 1 Tablet(s) Oral daily  nicotine -  14 mG/24Hr(s) Patch 1 patch Transdermal daily  sodium chloride 0.9%. 1000 milliLiter(s) (100 mL/Hr) IV Continuous <Continuous>        # NSTEMI  # NSVT 9/18 23/22  - Plavix loading 300 mg po x 1, then continue Plavix 75 mg po qdaily  - DAPT / STATIN /  Heparin drip  - LHC when WD resolved    # Acute Pancreatitis secondary to alcohol use  - CT scan: Acute interstitial edematous pancreatitis, Hepatic steatosis.  - advised to quit drinking alcohol  - IV hydration  advance diet      # Alcohol dependence  # Opiod /  benzo /  THC dependance  - Alcohol withdrawal  - keep on CIWA protocol dc stranding pt is too sedated  - Ativan prn  - on folic acid, MVI, Thiamine  - fall and seizure precaution  Psych f/up    Case d/w sister  Katrina Oseguera [phone], requesting Psych; pt is depressed

## 2021-09-23 NOTE — DIETITIAN NUTRITION RISK NOTIFICATION - ADDITIONAL COMMENTS/DIETITIAN RECOMMENDATIONS
1. Advance diet to regular ASAP to optimize nutrient intake, 2. Add Ensure Enlive BID, 3. Continue MVI w/ minerals, folic acid, and thiamine, 4. Monitor bowel movements, if no BM for >3 days, consider implementing bowel regimen. RD will continue to monitor PO intake, labs, hydration, and wt prn. 1. Advance diet to regular ASAP to optimize nutrient intake; if pt still with poor PO intake s/p cardiac cath, suggest considering initiation of nutrition support, 2. Add Ensure Enlive BID, 3. Continue MVI w/ minerals, folic acid, and thiamine, 4. Monitor bowel movements, if no BM for >3 days, consider implementing bowel regimen. RD will continue to monitor PO intake, labs, hydration, and wt prn.

## 2021-09-23 NOTE — PROGRESS NOTE ADULT - SUBJECTIVE AND OBJECTIVE BOX
Patient is a 55y old  Male who presents with a chief complaint of Abdominal pain.       HPI:  54 yo Male presented with upper abdominal pain, gradually worsening over the past week. Patient states he has also has associated nausea, vomiting and occasional loose stools. Abdominal pain is mainly in the epigastric area, no radiation of the pain. States symptoms seem similar to prior alcoholic pancreatitis years ago. No past abdominal surgical Hx. He is a +Chronic Drinker, had 3-4 drinks today. Occasional+Marijuana use also. Denies any chest pain, sob, dizziness or headache.   -   Pt seen this am.  He denies any CP or SOB.   - pt seen this am.  He denies any symptoms.  Overnight no chest pain per staff.  No DT.   - pt seen this am.     - pt seen this am, sleeping.  no issues overnight per staff.    PAST MEDICAL & SURGICAL HISTORY:  Mastocytosis    Pancreatitis    Opioid dependence    No significant past surgical history        MEDICATIONS  (STANDING):  aspirin enteric coated 81 milliGRAM(s) Oral daily  atorvastatin 80 milliGRAM(s) Oral at bedtime  clopidogrel Tablet 75 milliGRAM(s) Oral daily  folic acid 1 milliGRAM(s) Oral daily  heparin  Infusion.  Unit(s)/Hr (8 mL/Hr) IV Continuous <Continuous>  lisinopril 5 milliGRAM(s) Oral daily  LORazepam     Tablet   Oral   LORazepam     Tablet 2 milliGRAM(s) Oral every 4 hours  LORazepam     Tablet 1.5 milliGRAM(s) Oral every 4 hours  multivitamin 1 Tablet(s) Oral daily  sodium chloride 0.9%. 1000 milliLiter(s) (100 mL/Hr) IV Continuous <Continuous>  thiamine 100 milliGRAM(s) Oral daily    MEDICATIONS  (PRN):  acetaminophen   Tablet .. 650 milliGRAM(s) Oral every 6 hours PRN Mild Pain (1 - 3)  heparin   Injectable 4100 Unit(s) IV Push every 6 hours PRN For aPTT less than 40  LORazepam   Injectable 2 milliGRAM(s) IV Push every 1 hour PRN Symptom-triggered: each CIWA -Ar score 8 or GREATER  morphine  - Injectable 4 milliGRAM(s) IV Push every 4 hours PRN Severe Pain (7 - 10)  ondansetron Injectable 4 milliGRAM(s) IV Push every 6 hours PRN Nausea and/or Vomiting      FAMILY HISTORY:  No pertinent family history in first degree relatives        SOCIAL HISTORY: cocaine abuse , tobacco abuse     REVIEW OF SYSTEMS:  CONSTITUTIONAL:    No fatigue, malaise, lethargy.  No fever or chills.     RESPIRATORY:  No cough.  No wheeze.  No hemoptysis.  No shortness of breath.  CARDIOVASCULAR:  No chest pains.  No palpitations. No shortness of breath, No orthopnea or PND.  GASTROINTESTINAL:  no abdominal pain.  No nausea or vomiting.    GENITOURINARY:    No hematuria.    MUSCULOSKELETAL:  No musculoskeletal pain.  No joint swelling.  No arthritis.  NEUROLOGICAL:  No tingling or numbness or weakness.  PSYCHIATRIC:  No confusion  SKIN:  No rashes.        ICU Vital Signs Last 24 Hrs  T(C): 37.4 (23 Sep 2021 06:03), Max: 37.4 (23 Sep 2021 06:03)  T(F): 99.3 (23 Sep 2021 06:03), Max: 99.3 (23 Sep 2021 06:03)  HR: 72 (23 Sep 2021 06:00) (57 - 102)  BP: 96/55 (23 Sep 2021 06:00) (86/51 - 119/91)  BP(mean): 64 (23 Sep 2021 06:00) (57 - 98)  ABP: --  ABP(mean): --  RR: 18 (23 Sep 2021 06:00) (14 - 24)  SpO2: 100% (23 Sep 2021 06:00) (95% - 100%)        PHYSICAL EXAM-    Constitutional: no acute distress     Head: Head is normocephalic and atraumatic.      Neck: No JVD.     Cardiovascular: Regular rate and rhythm without S3, S4. No murmurs or rubs are appreciated.      Respiratory: Breath sounds are normal. No rales. No wheezing.    Abdomen: Soft, nontender, nondistended with positive bowel sounds.      Extremity: No tenderness. No  pitting edema     Neurologic: The patient is alert and oriented.                          9.8    8.19  )-----------( 151      ( 23 Sep 2021 05:01 )             29.0     09-22    129<L>  |  97  |  6<L>  ----------------------------<  86  3.5   |  21<L>  |  0.47<L>    Ca    8.6      22 Sep 2021 06:01  Phos  1.9     -  Mg     2.0     09-21            PTT - ( 23 Sep 2021 05:01 )  PTT:62.5 sec  - Chol 126 LDL -- HDL 54 Trig 75    Skin: No rash, no obvious lesions noted.      Psychiatric: The patient appears to be emotionally stable.      INTERPRETATION OF TELEMETRY:  SR, NSVT- one with 14 beats at admission.     ECG: Sinus rythm , t wave inversion in inferior leads.     I&O's Detail      LABS:                 LIVER FUNCTIONS - ( 19 Sep 2021 06:50 )  Alb: 3.2 g/dL / Pro: 7.0 gm/dL / ALK PHOS: 97 U/L / ALT: 49 U/L / AST: 205 U/L / GGT: x           PT/INR - ( 18 Sep 2021 18:27 )   PT: 13.2 sec;   INR: 1.14 ratio         PTT - ( 20 Sep 2021 01:37 )  PTT:45.6 sec   Chol 126 LDL -- HDL 54 Trig 75        PT/INR - ( 18 Sep 2021 18:27 )   PT: 13.2 sec;   INR: 1.14 ratio         PTT - ( 19 Sep 2021 03:34 )  PTT:52.7 sec  Urinalysis Basic - ( 19 Sep 2021 04:20 )    Color: Yellow / Appearance: Clear / S.010 / pH: x  Gluc: x / Ketone: Large  / Bili: Negative / Urobili: Negative mg/dL   Blood: x / Protein: 30 mg/dL / Nitrite: Negative   Leuk Esterase: Negative / RBC: 3-5 /HPF / WBC 0-2   Sq Epi: x / Non Sq Epi: x / Bacteria: Occasional      I&O's Summary    BNPSerum Pro-Brain Natriuretic Peptide: 132 pg/mL ( @ 18:27)    RADIOLOGY & ADDITIONAL STUDIES:  < from: CT Abdomen and Pelvis w/ IV Cont (21 @ 19:49) >  EXAM:  CT ABDOMEN AND PELVIS IC                            PROCEDURE DATE:  2021          INTERPRETATION:  CLINICAL INFORMATION: Upper abdominal pain. History of pancreatitis.    COMPARISON: CT abdomen/pelvis of 2015.    CONTRAST/COMPLICATIONS:  IV Contrast: Omnipaque 350  90 cc administered   10 cc discarded  Oral Contrast: Water  Complications: None reported at time of study completion    PROCEDURE:  CT of the Abdomen and Pelvis was performed.  Arterial and Portal Venous phases were acquired.  Sagittal and coronal reformats were performed.    FINDINGS:  LOWER CHEST: Within normal limits.    LIVER: Steatosis.  BILE DUCTS: Normal caliber.  GALLBLADDER: Within normal limits.  SPLEEN: Within normal limits.  PANCREAS: Peripancreaticinflammation. Grossly homogeneous parenchymal enhancement. No discrete pancreatic ductal dilatation. No drainable fluid collection.  ADRENALS: Within normal limits.  KIDNEYS/URETERS: Punctate nonobstructing right interpole renal calculus.    BLADDER:Within normal limits.  REPRODUCTIVE ORGANS: Prostate is mildly enlarged.    BOWEL: No bowel obstruction. Appendix is normal.  PERITONEUM: No ascites.  VESSELS: Minimal atherosclerotic calcifications of the abdominal aorta.. Patent portal, splenic, and superior mesenteric veins.  RETROPERITONEUM/LYMPH NODES: No lymphadenopathy.  ABDOMINAL WALL: Tiny fat-containing right inguinal hernia.  BONES: Degenerative changes.    IMPRESSION:  Acute interstitial edematous pancreatitis.    Hepatic steatosis.        --- End of Report ---            VIRAJ GREGORY MD; Attending Radiologist  This document has been electronically signed. Sep 18 2021  8:03PM    < end of copied text >  < from: TTE Echo Complete w/o Contrast w/ Doppler (21 @ 13:03) >     Impression     Summary     The mitral valve leaflets appear thin and normal.   Trace to Mild mitral regurgitation is present.   EA reversal of the mitral inflow consistent with reduced compliance of the   left ventricle.   The aortic valve is trileaflet with thin pliable leaflets.   Normal appearing tricuspid valve structure.   Trace to Mild tricuspid valve regurgitation is present.   Pulmonic valve not well seen.   Trace pulmonic valvular regurgitation is present.   Normal appearing left atrium.   Estimated left ventricular ejection fraction is 50 %.   The left ventricle is normal in size and wall thickness.   Wall motion abnormalities are noted with preserved LV systolic function.   Normal appearing right atrium.   Normal appearing right ventricle structure and function.     Signature     ----------------------------------------------------------------   Electronically signed by Nikki Lancaster MD(Interpreting   physician) on 2021 01:57 PM    < end of copied text >

## 2021-09-23 NOTE — DIETITIAN INITIAL EVALUATION ADULT. - ADD RECOMMEND
1. Advance diet to regular ASAP, 2. Add Ensure Enlive BID, 3. Continue MVI w/ minerals, folic acid, and thiamine due to alcohol consumption, 4. Monitor bowel movements, if no BM for >3 days, consider implementing bowel regimen. RD will continue to monitor PO intake, labs, hydration, and wt prn. 1. Advance diet to regular ASAP; if pt still with poor PO intake s/p cardiac cath, suggest considering initiation of nutrition support, 2. Add Ensure Enlive BID, 3. Continue MVI w/ minerals, folic acid, and thiamine due to alcohol consumption, 4. Monitor bowel movements, if no BM for >3 days, consider implementing bowel regimen. RD will continue to monitor PO intake, labs, hydration, and wt prn.

## 2021-09-23 NOTE — DIETITIAN INITIAL EVALUATION ADULT. - MALNUTRITION
Pt meets criteria for moderate malnutrition in context of acute illness Pt meets criteria for moderate malnutrition in context of acute illness r/t poor appetite 2/2 alcohol-induced acute pancreatitis AEB <50% energy intake for 5 days, mild muscle/ fat wasting

## 2021-09-23 NOTE — PATIENT PROFILE ADULT - HISTORY OF COVID-19 VACCINATION
I received a call last evening stating that the patient had a temperature of 100.3.  She denies any URI symptoms, cath site intact, and has no open wounds.  I will put in an order for her temperature to be rechecked this morning.   Yes

## 2021-09-23 NOTE — DIETITIAN INITIAL EVALUATION ADULT. - OTHER INFO
56 yo Male presented with upper abdominal pain, gradually worsening over the past week. He also has associated nausea, vomiting, and occasional loose stools upon admission. Abdominal pain is mainly in the epigastric area, no radiation of the pain. States symptoms seem similar to prior alcoholic pancreatitis years ago. No past abdominal surgical Hx. He is a +Chronic Drinker, had 3-4 drinks today. Occasional +Marijuana use.    Unable to obtain hx from pt 2/2 lethargy. Has not been eating meals since admission as per pt and nursing staff. Currently on full liquid diet, awaiting cardiac cath. Did not consume any breakfast today observed by RD. No bowel movements documented - pt experiencing constipation. Bed scale wt obtained 9/23 by RD= 150 lbs. No changes in wt since admission. 56 yo Male presented with upper abdominal pain, gradually worsening over the past week. He also has associated nausea, vomiting, and occasional loose stools upon admission. Abdominal pain is mainly in the epigastric area, no radiation of the pain. States symptoms seem similar to prior alcoholic pancreatitis years ago. No past abdominal surgical Hx. He is a +Chronic Drinker, had 3-4 drinks today. Occasional +Marijuana use.    Unable to obtain hx from pt 2/2 lethargy. Has not been eating meals since admission x 5 days as per pt and nursing staff. Currently on full liquid diet, awaiting cardiac cath. Did not consume any breakfast today observed by RD. No bowel movements documented - pt experiencing constipation. Bed scale wt obtained 9/23 by RD= 150 lbs. No changes in wt since admission.

## 2021-09-23 NOTE — DIETITIAN NUTRITION RISK NOTIFICATION - ETIOLOGY-BASIS
INPATIENT DEATH NOTE  Mónica Dean [de-identified] y o  female MRN: [id number]  Unit/Bed#: -01 Encounter: [id number]    Date, Time and Cause of Death    Date of Death:  20  Time of Death:  10:00 AM  Preliminary Cause of Death:  Ischemic bowel disease ([facility] [address] )  Entered by:  luz Cutler[SS1 1]     Attribution     SS1 1 Terri Mcguire, [address] 20 10:09           Patient's Information  Pronounced by: Patrice Jay  Did the patient's death occur in the ED?: No  Did the patient's death occur in the OR?: No  Did the patient's death occur less than 10 days post-op?: No  Did the patient's death occur within 24 hours of admission?: Yes  Was code status DNR at the time of death?: Yes    PHYSICAL EXAM:  Unresponsive to noxious stimuli, Spontaneous respirations absent, Breath sounds absent, Carotid pulse absent, Heart sounds absent, Pupillary light reflex absent and Corneal blink reflex absent    Medical Examiner notification criteria:  Patient  within 24 hours of arrival to hospital   Medical Examiner's office notified?:  Yes   Medical Examiner accepted case?:  No  Name of Medical Examiner: lesa Hobbs    Family Notification  Was the family notified?: Yes  Date Notified: 20  Time Notified: 1000  Notified by: Patrice Jay  Name of Family Notified of Death: arnulfo  and daughter   Relationship to Patient: Spouse, Daughter  Family Notification Route:  At bedside  Was the family told to contact a  home?: Yes  Name of  Home[de-identified] eber nieves    Autopsy Options:  Post-mortem examination declined by next of kin    Primary Service Attending Physician notified?:  yes - Attending:  Tobin Hartley, DO    Physician/Resident responsible for completing Discharge Summary:  Patrice Jay
Acute illness or injury

## 2021-09-24 LAB
ANION GAP SERPL CALC-SCNC: 8 MMOL/L — SIGNIFICANT CHANGE UP (ref 5–17)
APTT BLD: 71.7 SEC — HIGH (ref 27.5–35.5)
BUN SERPL-MCNC: 3 MG/DL — LOW (ref 7–23)
CALCIUM SERPL-MCNC: 8.5 MG/DL — SIGNIFICANT CHANGE UP (ref 8.5–10.1)
CHLORIDE SERPL-SCNC: 102 MMOL/L — SIGNIFICANT CHANGE UP (ref 96–108)
CO2 SERPL-SCNC: 24 MMOL/L — SIGNIFICANT CHANGE UP (ref 22–31)
CREAT SERPL-MCNC: 0.49 MG/DL — LOW (ref 0.5–1.3)
GLUCOSE SERPL-MCNC: 78 MG/DL — SIGNIFICANT CHANGE UP (ref 70–99)
HCT VFR BLD CALC: 32.1 % — LOW (ref 39–50)
HGB BLD-MCNC: 10.6 G/DL — LOW (ref 13–17)
MCHC RBC-ENTMCNC: 27 PG — SIGNIFICANT CHANGE UP (ref 27–34)
MCHC RBC-ENTMCNC: 33 GM/DL — SIGNIFICANT CHANGE UP (ref 32–36)
MCV RBC AUTO: 81.9 FL — SIGNIFICANT CHANGE UP (ref 80–100)
PLATELET # BLD AUTO: 208 K/UL — SIGNIFICANT CHANGE UP (ref 150–400)
POTASSIUM SERPL-MCNC: 3.7 MMOL/L — SIGNIFICANT CHANGE UP (ref 3.5–5.3)
POTASSIUM SERPL-SCNC: 3.7 MMOL/L — SIGNIFICANT CHANGE UP (ref 3.5–5.3)
RBC # BLD: 3.92 M/UL — LOW (ref 4.2–5.8)
RBC # FLD: 14.7 % — HIGH (ref 10.3–14.5)
SODIUM SERPL-SCNC: 134 MMOL/L — LOW (ref 135–145)
WBC # BLD: 9.01 K/UL — SIGNIFICANT CHANGE UP (ref 3.8–10.5)
WBC # FLD AUTO: 9.01 K/UL — SIGNIFICANT CHANGE UP (ref 3.8–10.5)

## 2021-09-24 PROCEDURE — 99232 SBSQ HOSP IP/OBS MODERATE 35: CPT

## 2021-09-24 RX ADMIN — MORPHINE SULFATE 4 MILLIGRAM(S): 50 CAPSULE, EXTENDED RELEASE ORAL at 08:13

## 2021-09-24 RX ADMIN — Medication 1 MILLIGRAM(S): at 11:45

## 2021-09-24 RX ADMIN — Medication 0.5 MILLIGRAM(S): at 11:45

## 2021-09-24 RX ADMIN — MORPHINE SULFATE 4 MILLIGRAM(S): 50 CAPSULE, EXTENDED RELEASE ORAL at 16:30

## 2021-09-24 RX ADMIN — MORPHINE SULFATE 4 MILLIGRAM(S): 50 CAPSULE, EXTENDED RELEASE ORAL at 00:28

## 2021-09-24 RX ADMIN — BUPROPION HYDROCHLORIDE 150 MILLIGRAM(S): 150 TABLET, EXTENDED RELEASE ORAL at 11:45

## 2021-09-24 RX ADMIN — MORPHINE SULFATE 4 MILLIGRAM(S): 50 CAPSULE, EXTENDED RELEASE ORAL at 04:33

## 2021-09-24 RX ADMIN — ATORVASTATIN CALCIUM 80 MILLIGRAM(S): 80 TABLET, FILM COATED ORAL at 21:03

## 2021-09-24 RX ADMIN — Medication 1 PATCH: at 11:45

## 2021-09-24 RX ADMIN — MORPHINE SULFATE 4 MILLIGRAM(S): 50 CAPSULE, EXTENDED RELEASE ORAL at 12:17

## 2021-09-24 RX ADMIN — Medication 0.5 MILLIGRAM(S): at 01:51

## 2021-09-24 RX ADMIN — MORPHINE SULFATE 4 MILLIGRAM(S): 50 CAPSULE, EXTENDED RELEASE ORAL at 12:40

## 2021-09-24 RX ADMIN — MORPHINE SULFATE 4 MILLIGRAM(S): 50 CAPSULE, EXTENDED RELEASE ORAL at 04:18

## 2021-09-24 RX ADMIN — MORPHINE SULFATE 4 MILLIGRAM(S): 50 CAPSULE, EXTENDED RELEASE ORAL at 20:30

## 2021-09-24 RX ADMIN — MORPHINE SULFATE 4 MILLIGRAM(S): 50 CAPSULE, EXTENDED RELEASE ORAL at 21:00

## 2021-09-24 RX ADMIN — Medication 0.5 MILLIGRAM(S): at 05:35

## 2021-09-24 RX ADMIN — Medication 0.5 MILLIGRAM(S): at 21:09

## 2021-09-24 RX ADMIN — MORPHINE SULFATE 4 MILLIGRAM(S): 50 CAPSULE, EXTENDED RELEASE ORAL at 08:30

## 2021-09-24 RX ADMIN — Medication 1 PATCH: at 08:54

## 2021-09-24 RX ADMIN — CLOPIDOGREL BISULFATE 75 MILLIGRAM(S): 75 TABLET, FILM COATED ORAL at 11:45

## 2021-09-24 RX ADMIN — MORPHINE SULFATE 4 MILLIGRAM(S): 50 CAPSULE, EXTENDED RELEASE ORAL at 00:13

## 2021-09-24 RX ADMIN — HEPARIN SODIUM 1450 UNIT(S)/HR: 5000 INJECTION INTRAVENOUS; SUBCUTANEOUS at 08:13

## 2021-09-24 RX ADMIN — Medication 81 MILLIGRAM(S): at 11:45

## 2021-09-24 RX ADMIN — Medication 1 TABLET(S): at 11:45

## 2021-09-24 RX ADMIN — Medication 1 PATCH: at 19:30

## 2021-09-24 NOTE — PROGRESS NOTE ADULT - ASSESSMENT
NSTEMI- He received plavix load.  continue dapt.   hold BB  Cocaine positive.  Echo results as stated above.  LVEF about 50% with inferolateral hypokinesis of the LV noted.   D/w Dr Kwok.  Pt proceeding with [facility] today.  NPO now.     NSVT- in the setting of NSTEMI.  no recurrence.  Goal k of 4 and mag of 2.     Acute pancreatitis- Management per primary team.     Tobacco and cocaine abuse- Advised strict cessation.  Explained the risks of smoking to the patient at length including but not limited to massive MI,CVA and death.  Patient expressed full understanding of this.     alcohol abuse- Management per primary team.   Monitor for DT closely.     Other medical issues- Management per primary team.   Thank you for allowing me to participate in the care of this patient. Please feel free to contact me with any questions.

## 2021-09-24 NOTE — PROGRESS NOTE ADULT - SUBJECTIVE AND OBJECTIVE BOX
Patient is a 55y old  Male who presents with a chief complaint of Abdominal pain.       HPI:  56 yo Male presented with upper abdominal pain, gradually worsening over the past week. Patient states he has also has associated nausea, vomiting and occasional loose stools. Abdominal pain is mainly in the epigastric area, no radiation of the pain. States symptoms seem similar to prior alcoholic pancreatitis years ago. No past abdominal surgical Hx. He is a +Chronic Drinker, had 3-4 drinks today. Occasional+Marijuana use also. Denies any chest pain, sob, dizziness or headache.   9/19-   Pt seen this am.  He denies any CP or SOB.   9/20- pt seen this am.  He denies any symptoms.  Overnight no chest pain per staff.  No DT.   9/21- pt seen this am.     9/23- pt seen this am, sleeping.  no issues overnight per staff.    9/24- pt seen this am.  He is c/o CP and asking for morphine- not clear how much of his history is reliable.    PAST MEDICAL & SURGICAL HISTORY:  Mastocytosis    Pancreatitis    Opioid dependence    No significant past surgical history        MEDICATIONS  (STANDING):  aspirin enteric coated 81 milliGRAM(s) Oral daily  atorvastatin 80 milliGRAM(s) Oral at bedtime  clopidogrel Tablet 75 milliGRAM(s) Oral daily  folic acid 1 milliGRAM(s) Oral daily  heparin  Infusion.  Unit(s)/Hr (8 mL/Hr) IV Continuous <Continuous>  lisinopril 5 milliGRAM(s) Oral daily  LORazepam     Tablet   Oral   LORazepam     Tablet 2 milliGRAM(s) Oral every 4 hours  LORazepam     Tablet 1.5 milliGRAM(s) Oral every 4 hours  multivitamin 1 Tablet(s) Oral daily  sodium chloride 0.9%. 1000 milliLiter(s) (100 mL/Hr) IV Continuous <Continuous>  thiamine 100 milliGRAM(s) Oral daily    MEDICATIONS  (PRN):  acetaminophen   Tablet .. 650 milliGRAM(s) Oral every 6 hours PRN Mild Pain (1 - 3)  heparin   Injectable 4100 Unit(s) IV Push every 6 hours PRN For aPTT less than 40  LORazepam   Injectable 2 milliGRAM(s) IV Push every 1 hour PRN Symptom-triggered: each CIWA -Ar score 8 or GREATER  morphine  - Injectable 4 milliGRAM(s) IV Push every 4 hours PRN Severe Pain (7 - 10)  ondansetron Injectable 4 milliGRAM(s) IV Push every 6 hours PRN Nausea and/or Vomiting      FAMILY HISTORY:  No pertinent family history in first degree relatives        SOCIAL HISTORY: cocaine abuse , tobacco abuse     REVIEW OF SYSTEMS:  CONSTITUTIONAL:    No fatigue, malaise, lethargy.  No fever or chills.     RESPIRATORY:  No cough.  No wheeze.  No hemoptysis.  No shortness of breath.  CARDIOVASCULAR:  No chest pains.  No palpitations. No shortness of breath, No orthopnea or PND.  GASTROINTESTINAL:  no abdominal pain.  No nausea or vomiting.    GENITOURINARY:    No hematuria.    MUSCULOSKELETAL:  No musculoskeletal pain.  No joint swelling.  No arthritis.  NEUROLOGICAL:  No tingling or numbness or weakness.  PSYCHIATRIC:  No confusion  SKIN:  No rashes.        ICU Vital Signs Last 24 Hrs  T(C): 36.8 (24 Sep 2021 06:40), Max: 36.9 (23 Sep 2021 20:52)  T(F): 98.3 (24 Sep 2021 06:40), Max: 98.5 (23 Sep 2021 20:52)  HR: 73 (24 Sep 2021 08:00) (64 - 82)  BP: 107/56 (24 Sep 2021 08:00) (97/75 - 130/74)  BP(mean): 68 (24 Sep 2021 08:00) (66 - 91)  ABP: --  ABP(mean): --  RR: 22 (24 Sep 2021 08:00) (13 - 22)  SpO2: 97% (24 Sep 2021 06:00) (95% - 100%)          PHYSICAL EXAM-    Constitutional: no acute distress     Head: Head is normocephalic and atraumatic.      Neck: No JVD.     Cardiovascular: Regular rate and rhythm without S3, S4. No murmurs or rubs are appreciated.      Respiratory: Breath sounds are normal. No rales. No wheezing.    Abdomen: Soft, nontender, nondistended with positive bowel sounds.      Extremity: No tenderness. No  pitting edema     Neurologic: The patient is alert and oriented.               Skin: No rash, no obvious lesions noted.      Psychiatric: The patient appears to be emotionally stable.      INTERPRETATION OF TELEMETRY:  SR, NSVT- one with 14 beats at admission.     ECG: Sinus rythm , t wave inversion in inferior leads.     I&O's Detail      LABS:                                   10.6   9.01  )-----------( 208      ( 24 Sep 2021 06:53 )             32.1     09-24    134<L>  |  102  |  3<L>  ----------------------------<  78  3.7   |  24  |  0.49<L>    Ca    8.5      24 Sep 2021 06:53            PTT - ( 24 Sep 2021 06:53 )  PTT:71.7 sec  09-19 Chol 126 LDL -- HDL 54 Trig 75        I&O's Summary    BNPSerum Pro-Brain Natriuretic Peptide: 132 pg/mL (09-18 @ 18:27)    RADIOLOGY & ADDITIONAL STUDIES:  < from: CT Abdomen and Pelvis w/ IV Cont (09.18.21 @ 19:49) >  EXAM:  CT ABDOMEN AND PELVIS IC                            PROCEDURE DATE:  09/18/2021          INTERPRETATION:  CLINICAL INFORMATION: Upper abdominal pain. History of pancreatitis.    COMPARISON: CT abdomen/pelvis of 7/25/2015.    CONTRAST/COMPLICATIONS:  IV Contrast: Omnipaque 350  90 cc administered   10 cc discarded  Oral Contrast: Water  Complications: None reported at time of study completion    PROCEDURE:  CT of the Abdomen and Pelvis was performed.  Arterial and Portal Venous phases were acquired.  Sagittal and coronal reformats were performed.    FINDINGS:  LOWER CHEST: Within normal limits.    LIVER: Steatosis.  BILE DUCTS: Normal caliber.  GALLBLADDER: Within normal limits.  SPLEEN: Within normal limits.  PANCREAS: Peripancreaticinflammation. Grossly homogeneous parenchymal enhancement. No discrete pancreatic ductal dilatation. No drainable fluid collection.  ADRENALS: Within normal limits.  KIDNEYS/URETERS: Punctate nonobstructing right interpole renal calculus.    BLADDER:Within normal limits.  REPRODUCTIVE ORGANS: Prostate is mildly enlarged.    BOWEL: No bowel obstruction. Appendix is normal.  PERITONEUM: No ascites.  VESSELS: Minimal atherosclerotic calcifications of the abdominal aorta.. Patent portal, splenic, and superior mesenteric veins.  RETROPERITONEUM/LYMPH NODES: No lymphadenopathy.  ABDOMINAL WALL: Tiny fat-containing right inguinal hernia.  BONES: Degenerative changes.    IMPRESSION:  Acute interstitial edematous pancreatitis.    Hepatic steatosis.        --- End of Report ---            VIRAJ GREGORY MD; Attending Radiologist  This document has been electronically signed. Sep 18 2021  8:03PM    < end of copied text >  < from: TTE Echo Complete w/o Contrast w/ Doppler (09.19.21 @ 13:03) >     Impression     Summary     The mitral valve leaflets appear thin and normal.   Trace to Mild mitral regurgitation is present.   EA reversal of the mitral inflow consistent with reduced compliance of the   left ventricle.   The aortic valve is trileaflet with thin pliable leaflets.   Normal appearing tricuspid valve structure.   Trace to Mild tricuspid valve regurgitation is present.   Pulmonic valve not well seen.   Trace pulmonic valvular regurgitation is present.   Normal appearing left atrium.   Estimated left ventricular ejection fraction is 50 %.   The left ventricle is normal in size and wall thickness.   Wall motion abnormalities are noted with preserved LV systolic function.   Normal appearing right atrium.   Normal appearing right ventricle structure and function.     Signature     ----------------------------------------------------------------   Electronically signed by Nikki Lancaster MD(Interpreting   physician) on 09/19/2021 01:57 PM    < end of copied text >

## 2021-09-24 NOTE — PACU DISCHARGE NOTE - COMMENTS
Report given to Cassi LOZOYA in CCU. Patient A&Ox4, speech clear. V/S/S. Access site intact, no bleeding or hematoma. Patient placed on portable monitor, and transported with RN.

## 2021-09-24 NOTE — PROGRESS NOTE ADULT - SUBJECTIVE AND OBJECTIVE BOX
56 yo Male presented with upper abdominal pain, gradually worsening over the past week. Patient states he has also has associated nausea, vomiting and occasional loose stools. Abdominal pain is mainly in the epigastric area, no radiation of the pain. States symptoms seem similar to prior alcoholic pancreatitis years ago. No past abdominal surgical Hx. He is a +Chronic Drinker, had 3-4 drinks today. Occasional+Marijuana use also. Denies any chest pain, sob, dizziness or headache.   Patient seen and evaluated. very sleepy. Notes of band like pain around the epigastric region, similar to what he had years ago. Patient also notes of chest discomfort -  this was discussed with Dr. Lemon (plan to obtain EKG /  troponins). Patient hemodynamically stable. Had brief episode of NSVT yesterday -  I suspect this is most likely ischemia related. repeated the EKG in the ED -  which show new T wave inversions in II, III, AVF.       Stable no change in his status  c/w WD; on CIWA protocol    Vital Signs Last 24 Hrs  T(C): 37.4 (23 Sep 2021 06:03), Max: 37.4 (23 Sep 2021 06:03)  T(F): 99.3 (23 Sep 2021 06:03), Max: 99.3 (23 Sep 2021 06:03)  HR: 69 (23 Sep 2021 10:00) (63 - 98)  BP: 107/64 (23 Sep 2021 10:00) (86/51 - 117/89)  BP(mean): 74 (23 Sep 2021 10:00) (57 - 95)  RR: 16 (23 Sep 2021 10:00) (14 - 24)  SpO2: 98% (23 Sep 2021 10:00) (95% - 100%)    heent nc at perrla, dry oral muc NPO  NECK:  Supple without lymphadenopathy.   HEART:  Regular rate and rhythm. normal S1 and S2, No M/R/G  LUNGS:  Good ins/exp effort, no W/R/R/C  ABDOMEN:  Soft, mild epigastric tenderness nondistended with good bowel sounds heard  EXTREMITIES:  Without cyanosis, clubbing or edema.   NEUROLOGICAL:  Gross nonfocal   skin no rashes                              11.0   8.87  )-----------( 120      ( 22 Sep 2021 06:01 )             32.2   09-22    129<L>  |  97  |  6<L>  ----------------------------<  86  3.5   |  21<L>  |  0.47<L>    Ca    8.6      22 Sep 2021 06:01  Phos  1.9     09-21  Mg     2.0     09-21    TPro  6.4  /  Alb  2.7<L>  /  TBili  0.6  /  DBili  0.2  /  AST  86<H>  /  ALT  54  /  AlkPhos  78  09-20      MEDICATIONS  (STANDING):  aspirin enteric coated 81 milliGRAM(s) Oral daily  atorvastatin 80 milliGRAM(s) Oral at bedtime  buPROPion XL (24-Hour) Oral Tab/Cap - Peds 150 milliGRAM(s) Oral daily  clopidogrel Tablet 75 milliGRAM(s) Oral daily  folic acid 1 milliGRAM(s) Oral daily  heparin  Infusion.  Unit(s)/Hr (8 mL/Hr) IV Continuous <Continuous>  LORazepam     Tablet   Oral   LORazepam     Tablet 1 milliGRAM(s) Oral every 4 hours  multivitamin 1 Tablet(s) Oral daily  nicotine -  14 mG/24Hr(s) Patch 1 patch Transdermal daily  sodium chloride 0.9%. 1000 milliLiter(s) (100 mL/Hr) IV Continuous <Continuous>        # NSTEMI  # NSVT 9/18 23/22  - Plavix loading 300 mg po x 1, then continue Plavix 75 mg po qdaily  - DAPT / STATIN /  Heparin drip  - C today and dc UFH drip    # Acute Pancreatitis secondary to alcohol use  - CT scan: Acute interstitial edematous pancreatitis, Hepatic steatosis.  - advised to quit drinking alcohol  - IV hydration  advance diet/ resolved      # Alcohol dependence  # Opiod /  benzo /  THC dependance  - Alcohol withdrawal  - keep on CIWA protocol dc stranding pt is too sedated  - Ativan prn  - on folic acid, MVI, Thiamine  - fall and seizure precaution  Psych f/up; c/w 1:1 pt not safe for dc    Case d/w sister  Katrina Oseguera [phone], requesting Pulm consult due to the fact that pt uses inhaled cocaine

## 2021-09-25 DIAGNOSIS — F14.10 COCAINE ABUSE, UNCOMPLICATED: ICD-10-CM

## 2021-09-25 DIAGNOSIS — I47.2 VENTRICULAR TACHYCARDIA: ICD-10-CM

## 2021-09-25 DIAGNOSIS — I47.1 SUPRAVENTRICULAR TACHYCARDIA: ICD-10-CM

## 2021-09-25 DIAGNOSIS — I21.4 NON-ST ELEVATION (NSTEMI) MYOCARDIAL INFARCTION: ICD-10-CM

## 2021-09-25 DIAGNOSIS — K85.90 ACUTE PANCREATITIS WITHOUT NECROSIS OR INFECTION, UNSPECIFIED: ICD-10-CM

## 2021-09-25 LAB
APTT BLD: 33.3 SEC — SIGNIFICANT CHANGE UP (ref 27.5–35.5)
HCT VFR BLD CALC: 32.2 % — LOW (ref 39–50)
HGB BLD-MCNC: 10.7 G/DL — LOW (ref 13–17)
MCHC RBC-ENTMCNC: 26.9 PG — LOW (ref 27–34)
MCHC RBC-ENTMCNC: 33.2 GM/DL — SIGNIFICANT CHANGE UP (ref 32–36)
MCV RBC AUTO: 80.9 FL — SIGNIFICANT CHANGE UP (ref 80–100)
PLATELET # BLD AUTO: 255 K/UL — SIGNIFICANT CHANGE UP (ref 150–400)
RBC # BLD: 3.98 M/UL — LOW (ref 4.2–5.8)
RBC # FLD: 14.6 % — HIGH (ref 10.3–14.5)
SARS-COV-2 RNA SPEC QL NAA+PROBE: SIGNIFICANT CHANGE UP
WBC # BLD: 9.11 K/UL — SIGNIFICANT CHANGE UP (ref 3.8–10.5)
WBC # FLD AUTO: 9.11 K/UL — SIGNIFICANT CHANGE UP (ref 3.8–10.5)

## 2021-09-25 PROCEDURE — 99233 SBSQ HOSP IP/OBS HIGH 50: CPT

## 2021-09-25 RX ORDER — MORPHINE SULFATE 50 MG/1
4 CAPSULE, EXTENDED RELEASE ORAL EVERY 4 HOURS
Refills: 0 | Status: DISCONTINUED | OUTPATIENT
Start: 2021-09-25 | End: 2021-09-26

## 2021-09-25 RX ADMIN — Medication 1 TABLET(S): at 09:21

## 2021-09-25 RX ADMIN — MORPHINE SULFATE 4 MILLIGRAM(S): 50 CAPSULE, EXTENDED RELEASE ORAL at 04:39

## 2021-09-25 RX ADMIN — MORPHINE SULFATE 4 MILLIGRAM(S): 50 CAPSULE, EXTENDED RELEASE ORAL at 01:07

## 2021-09-25 RX ADMIN — Medication 0.5 MILLIGRAM(S): at 21:25

## 2021-09-25 RX ADMIN — MORPHINE SULFATE 4 MILLIGRAM(S): 50 CAPSULE, EXTENDED RELEASE ORAL at 08:55

## 2021-09-25 RX ADMIN — ATORVASTATIN CALCIUM 80 MILLIGRAM(S): 80 TABLET, FILM COATED ORAL at 21:25

## 2021-09-25 RX ADMIN — Medication 1 PATCH: at 19:06

## 2021-09-25 RX ADMIN — BUPROPION HYDROCHLORIDE 150 MILLIGRAM(S): 150 TABLET, EXTENDED RELEASE ORAL at 09:21

## 2021-09-25 RX ADMIN — Medication 1 MILLIGRAM(S): at 09:21

## 2021-09-25 RX ADMIN — CLOPIDOGREL BISULFATE 75 MILLIGRAM(S): 75 TABLET, FILM COATED ORAL at 09:21

## 2021-09-25 RX ADMIN — Medication 1 PATCH: at 09:22

## 2021-09-25 RX ADMIN — SODIUM CHLORIDE 100 MILLILITER(S): 9 INJECTION INTRAMUSCULAR; INTRAVENOUS; SUBCUTANEOUS at 06:42

## 2021-09-25 RX ADMIN — MORPHINE SULFATE 4 MILLIGRAM(S): 50 CAPSULE, EXTENDED RELEASE ORAL at 22:22

## 2021-09-25 RX ADMIN — MORPHINE SULFATE 4 MILLIGRAM(S): 50 CAPSULE, EXTENDED RELEASE ORAL at 13:07

## 2021-09-25 RX ADMIN — Medication 1 MILLIGRAM(S): at 09:34

## 2021-09-25 RX ADMIN — MORPHINE SULFATE 4 MILLIGRAM(S): 50 CAPSULE, EXTENDED RELEASE ORAL at 17:22

## 2021-09-25 RX ADMIN — MORPHINE SULFATE 4 MILLIGRAM(S): 50 CAPSULE, EXTENDED RELEASE ORAL at 13:20

## 2021-09-25 RX ADMIN — MORPHINE SULFATE 4 MILLIGRAM(S): 50 CAPSULE, EXTENDED RELEASE ORAL at 17:43

## 2021-09-25 RX ADMIN — MORPHINE SULFATE 4 MILLIGRAM(S): 50 CAPSULE, EXTENDED RELEASE ORAL at 08:43

## 2021-09-25 RX ADMIN — Medication 0.5 MILLIGRAM(S): at 09:21

## 2021-09-25 RX ADMIN — MORPHINE SULFATE 4 MILLIGRAM(S): 50 CAPSULE, EXTENDED RELEASE ORAL at 00:37

## 2021-09-25 RX ADMIN — MORPHINE SULFATE 4 MILLIGRAM(S): 50 CAPSULE, EXTENDED RELEASE ORAL at 21:52

## 2021-09-25 RX ADMIN — Medication 81 MILLIGRAM(S): at 09:21

## 2021-09-25 NOTE — PROGRESS NOTE ADULT - ASSESSMENT
54 yo M presented with epigastric pain, hx Alcohol dependence and acute pancreatitis 2/2 EtOH, NSTEMI and NSVT

## 2021-09-25 NOTE — PROGRESS NOTE ADULT - SUBJECTIVE AND OBJECTIVE BOX
Patient is a 55y old  Male who presents with a chief complaint of Abdominal pain (25 Sep 2021 10:11)      History, interim events and clinically pertinent issues reviewed; patient interviewed and examined.  Still c/o abd pain but ate lunch and breakfast no N /V  No other complaints and 14 point ROS otherwise negative    ALLERGIES:  No Known Allergies    MEDICATIONS  (STANDING):  aspirin enteric coated 81 milliGRAM(s) Oral daily  atorvastatin 80 milliGRAM(s) Oral at bedtime  buPROPion XL (24-Hour) Oral Tab/Cap - Peds 150 milliGRAM(s) Oral daily  clopidogrel Tablet 75 milliGRAM(s) Oral daily  folic acid 1 milliGRAM(s) Oral daily  LORazepam     Tablet   Oral   LORazepam     Tablet 0.5 milliGRAM(s) Oral every 12 hours  multivitamin 1 Tablet(s) Oral daily  nicotine -  14 mG/24Hr(s) Patch 1 patch Transdermal daily    MEDICATIONS  (PRN):  acetaminophen   Tablet .. 650 milliGRAM(s) Oral every 6 hours PRN Mild Pain (1 - 3)  heparin   Injectable 4100 Unit(s) IV Push every 6 hours PRN For aPTT less than 40  LORazepam   Injectable 1 milliGRAM(s) IV Push every 2 hours PRN if he wants to leave AMA  LORazepam   Injectable 2 milliGRAM(s) IV Push every 1 hour PRN Symptom-triggered: each CIWA -Ar score 8 or GREATER  morphine  - Injectable 4 milliGRAM(s) IV Push every 4 hours PRN Severe Pain (7 - 10)  ondansetron Injectable 4 milliGRAM(s) IV Push every 6 hours PRN Nausea and/or Vomiting    Vital Signs Last 24 Hrs  T(F): 98 (25 Sep 2021 12:30), Max: 99.1 (25 Sep 2021 00:30)  HR: 65 (25 Sep 2021 12:30) (61 - 84)  BP: 126/65 (25 Sep 2021 12:30) (108/63 - 151/95)  RR: 16 (25 Sep 2021 12:30) (11 - 20)  SpO2: 98% (25 Sep 2021 12:30) (96% - 100%)  I&O's Summary    24 Sep 2021 07:01  -  25 Sep 2021 07:00  --------------------------------------------------------  IN: 1942 mL / OUT: 550 mL / NET: 1392 mL      BMI (kg/m2): 24.9 (09-24-21 @ 09:32)          PHYSICAL EXAM:  General: NAD, A/O x 3  ENT: MMM  Neck: Supple, No JVD  Lungs: Clear to auscultation bilaterally  Cardio: RRR, S1/S2, No murmurs  Abdomen: Soft, Nondistended; Bowel sounds present mild tenderness on palpation  Extremities: No calf tenderness, No pitting edema      LABS:                        10.7   9.11  )-----------( 255      ( 25 Sep 2021 08:35 )             32.2       09-24    134  |  102  |  3   ----------------------------<  78  3.7   |  24  |  0.49    Ca    8.5      24 Sep 2021 06:53       eGFR if Non African American: 123 mL/min/1.73M2 (09-24-21 @ 06:53)  eGFR if : 143 mL/min/1.73M2 (09-24-21 @ 06:53)    PTT - ( 25 Sep 2021 08:35 )  PTT:33.3 sec             09-19 Chol 126 mg/dL LDL -- HDL 54 mg/dL Trig 75 mg/dL                Culture - Urine (collected 19 Sep 2021 04:20)  Source: Clean Catch None  Final Report (20 Sep 2021 05:41):    <10,000 CFU/mL Normal Urogenital Claudia      COVID-19 PCR: NotDetec (09-18-21 @ 18:27)

## 2021-09-25 NOTE — CONSULT NOTE ADULT - ASSESSMENT
PROBLEMS:    NSTEMI  # NSVT 9/18 23/22  - Plavix loading 300 mg po x 1, then continue Plavix 75 mg po qdaily  - DAPT / STATIN /  Heparin drip  - LHC today and dc UFH drip    # Acute Pancreatitis secondary to alcohol use  - CT scan: Acute interstitial edematous pancreatitis, Hepatic steatosis.  - advised to quit drinking alcohol  - IV hydration  advance diet/ resolved      # Alcohol dependence  # Opiod /  benzo /  THC dependance  - Alcohol withdrawal  - keep on CIWA protocol dc stranding pt is too sedated  - Ativan prn  - on folic acid, MVI, Thiamine  - fall and seizure precaution  Psych f/up; c/w 1:1 pt not safe for dc# NSTEMI  # NSVT 9/18 23/22  - Plavix loading 300 mg po x 1, then continue Plavix 75 mg po qdaily  - DAPT / STATIN /  Heparin drip  - LHC today and dc UFH drip    PLAN:    - IV hydration  advance diet/ resolved      # Alcohol dependence  # Opiod /  benzo /  THC dependance  - Alcohol withdrawal  - keep on CIWA protocol dc stranding pt is too sedated  - Ativan prn  - on folic acid, MVI, Thiamine  - fall and seizure precaution  Psych f/up; c/w 1:1 pt not safe for dc PROBLEMS:    NSTEMI  NSVT 9/18 23/22  SOB  Acute Pancreatitis secondary to alcohol use  CT scan: Acute interstitial edematous pancreatitis-Hepatic steatosis  Alcohol dependence  Opiod /  benzo /  THC dependance    PLAN:    pulmonary no acute path supportive care  IV hydration  advance diet/ resolved  Alcohol withdrawal  keep on CIWA protocol dc stranding pt is too sedated  Ativan prn  folic acid, MVI, Thiamine  Fall and seizure precaution  DVT prophylasix

## 2021-09-25 NOTE — CHART NOTE - NSCHARTNOTEFT_GEN_A_CORE
HOSPITALIST PA CHART NOTE     Patient is a 54 y/o M wit pmhx of alcoholic pancreatitis, opioid dependence presented with complaints of abdominal pain. Patient found to be   +NSTEMI on admission and was admitted to CCU. Cardiology on the case. Patient was complaining of CP earlier per note.  Patient went for cath today, no stents placed 2/2 pt non-compliance.  Patient was downgraded to med/surg. Patient has orders for prn Morphine. Patient complaining of CP close to due time for morphine. Morphine was administered to patient. Patient seen at bedside, resting comfortably. Asked patient about chest pain, states it is better now. Patient asking when is he next due for morphine, he believes he will need more in an hour.     Physical Exam:   GEN- alert, NAD  Cardio- regular rate and rhythm   Resp: lungs clear to auscultation b/l,. no signs of respiratory distress   Abdomen- generalized tenderness to palpation   Skin- no diaphoresis, no pallor. Skin is warm, moist and intact. Normal cap refill < 2 sec    Likely drug seeking behavior in addition to active issues. Cardio note appreciated. Patient received Plavix load. VSS, patient resting comfortably.  CP, resolved s/p morphine. Continue to monitor status.     Vital Signs Last 24 Hrs  T(C): 37.3 (25 Sep 2021 00:30), Max: 37.3 (25 Sep 2021 00:30)  T(F): 99.1 (25 Sep 2021 00:30), Max: 99.1 (25 Sep 2021 00:30)  HR: 73 (25 Sep 2021 00:30) (58 - 84)  BP: 127/67 (25 Sep 2021 00:30) (103/56 - 151/95)  BP(mean): 74 (24 Sep 2021 19:00) (68 - 112)  RR: 19 (25 Sep 2021 00:30) (11 - 22)  SpO2: 98% (25 Sep 2021 00:30) (95% - 100%)

## 2021-09-25 NOTE — CONSULT NOTE ADULT - SUBJECTIVE AND OBJECTIVE BOX
HPI:    55 y.o.m with upper abdominal pain, gradually worsening over the past week. Patient states he has also has associated nausea, vomiting and occasional loose stools. Abdominal pain is mainly in the epigastric area, no radiation of the pain. States symptoms seem similar to prior alcoholic pancreatitis years ago. No past abdominal surgical Hx. He is a +Chronic Drinker, had 3-4 drinks today. Occasional+Marijuana use also. Denies any chest pain, sob, dizziness or headache.  (18 Sep 2021 23:35)      PAST MEDICAL & SURGICAL HISTORY:  Mastocytosis    Pancreatitis    Opioid dependence    No significant past surgical history        Home Medications:      MEDICATIONS  (STANDING):  aspirin enteric coated 81 milliGRAM(s) Oral daily  atorvastatin 80 milliGRAM(s) Oral at bedtime  buPROPion XL (24-Hour) Oral Tab/Cap - Peds 150 milliGRAM(s) Oral daily  clopidogrel Tablet 75 milliGRAM(s) Oral daily  folic acid 1 milliGRAM(s) Oral daily  LORazepam     Tablet   Oral   LORazepam     Tablet 0.5 milliGRAM(s) Oral every 12 hours  multivitamin 1 Tablet(s) Oral daily  nicotine -  14 mG/24Hr(s) Patch 1 patch Transdermal daily    MEDICATIONS  (PRN):  acetaminophen   Tablet .. 650 milliGRAM(s) Oral every 6 hours PRN Mild Pain (1 - 3)  heparin   Injectable 4100 Unit(s) IV Push every 6 hours PRN For aPTT less than 40  LORazepam   Injectable 1 milliGRAM(s) IV Push every 2 hours PRN if he wants to leave AMA  LORazepam   Injectable 2 milliGRAM(s) IV Push every 1 hour PRN Symptom-triggered: each CIWA -Ar score 8 or GREATER  morphine  - Injectable 4 milliGRAM(s) IV Push every 4 hours PRN Severe Pain (7 - 10)  ondansetron Injectable 4 milliGRAM(s) IV Push every 6 hours PRN Nausea and/or Vomiting      Allergies    No Known Allergies    Intolerances        SOCIAL HISTORY: Denies tobacco, etoh abuse or illicit drug use    FAMILY HISTORY:  No pertinent family history in first degree relatives        Vital Signs Last 24 Hrs  T(C): 36.8 (25 Sep 2021 08:48), Max: 37.3 (25 Sep 2021 00:30)  T(F): 98.2 (25 Sep 2021 08:48), Max: 99.1 (25 Sep 2021 00:30)  HR: 61 (25 Sep 2021 08:48) (58 - 84)  BP: 131/87 (25 Sep 2021 08:48) (108/63 - 151/95)  BP(mean): 74 (24 Sep 2021 19:00) (74 - 112)  RR: 16 (25 Sep 2021 08:48) (11 - 20)  SpO2: 96% (25 Sep 2021 08:48) (96% - 100%)        REVIEW OF SYSTEMS:    CONSTITUTIONAL:  As per HPI.  HEENT:  Eyes:  No diplopia or blurred vision. ENT:  No earache, sore throat or runny nose.  CARDIOVASCULAR:  No pressure, squeezing, tightness, heaviness or aching about the chest, neck, axilla or epigastrium.  RESPIRATORY:  No cough, shortness of breath, PND or orthopnea.  GASTROINTESTINAL:  No nausea, vomiting or diarrhea.  GENITOURINARY:  No dysuria, frequency or urgency.  MUSCULOSKELETAL:  As per HPI.  SKIN:  No change in skin, hair or nails.  NEUROLOGIC:  No paresthesias, fasciculations, seizures or weakness.  PSYCHIATRIC:  No disorder of thought or mood.  ENDOCRINE:  No heat or cold intolerance, polyuria or polydipsia.  HEMATOLOGICAL:  No easy bruising or bleedings:  .     PHYSICAL EXAMINATION:    GENERAL APPEARANCE:  Pt. is not currently dyspneic, in no distress. Pt. is alert, oriented, and pleasant.  HEENT:  Pupils are normal and react normally. No icterus. Mucous membranes well colored.  NECK:  Supple. No lymphadenopathy. Jugular venous pressure not elevated. Carotids equal.   HEART:   The cardiac impulse has a normal quality. Regular. Normal S1 and S2. There are no murmurs, rubs or gallops noted  CHEST:  Chest is clear to auscultation. Normal respiratory effort.  ABDOMEN:  Soft and nontender.   EXTREMITIES:  There is no cyanosis, clubbing or edema.   SKIN:  No rash or significant lesions are noted.    LABS:                        10.7   9.11  )-----------( 255      ( 25 Sep 2021 08:35 )             32.2     09-24    134<L>  |  102  |  3<L>  ----------------------------<  78  3.7   |  24  |  0.49<L>    Ca    8.5      24 Sep 2021 06:53        PTT - ( 25 Sep 2021 08:35 )  PTT:33.3 sec    RADIOLOGY & ADDITIONAL STUDIES:     Xray Chest 1 View- PORTABLE-Urgent (09.18.21 @ 19:24) >  FINDINGS:    The lungs are clear of airspace consolidations or effusions. No pneumothorax.    The heart and mediastinum size and configuration are within normal limits.    Visualized osseous structures are intact.    IMPRESSION:   No radiographic evidence of active chest disease.      CT Abdomen and Pelvis w/ IV Cont (09.18.21 @ 19:49) >  IMPRESSION:  Acute interstitial edematous pancreatitis.    Hepatic steatosis.         HPI:    55 y.o.m with upper abdominal pain, gradually worsening over the past week. Patient states he has also has associated nausea, vomiting and occasional loose stools. Abdominal pain is mainly in the epigastric area, no radiation of the pain. States symptoms seem similar to prior alcoholic pancreatitis years ago. No past abdominal surgical Hx. He is a +Chronic Drinker, had 3-4 drinks today. Occasional+Marijuana use also. Denies any chest pain, sob, dizziness or headache. pat seen for pulmonary eval, 1;1-observation, no cough or fever.      PAST MEDICAL & SURGICAL HISTORY:  Mastocytosis    Pancreatitis    Opioid dependence    No significant past surgical history        Home Medications:      MEDICATIONS  (STANDING):  aspirin enteric coated 81 milliGRAM(s) Oral daily  atorvastatin 80 milliGRAM(s) Oral at bedtime  buPROPion XL (24-Hour) Oral Tab/Cap - Peds 150 milliGRAM(s) Oral daily  clopidogrel Tablet 75 milliGRAM(s) Oral daily  folic acid 1 milliGRAM(s) Oral daily  LORazepam     Tablet   Oral   LORazepam     Tablet 0.5 milliGRAM(s) Oral every 12 hours  multivitamin 1 Tablet(s) Oral daily  nicotine -  14 mG/24Hr(s) Patch 1 patch Transdermal daily    MEDICATIONS  (PRN):  acetaminophen   Tablet .. 650 milliGRAM(s) Oral every 6 hours PRN Mild Pain (1 - 3)  heparin   Injectable 4100 Unit(s) IV Push every 6 hours PRN For aPTT less than 40  LORazepam   Injectable 1 milliGRAM(s) IV Push every 2 hours PRN if he wants to leave AMA  LORazepam   Injectable 2 milliGRAM(s) IV Push every 1 hour PRN Symptom-triggered: each CIWA -Ar score 8 or GREATER  morphine  - Injectable 4 milliGRAM(s) IV Push every 4 hours PRN Severe Pain (7 - 10)  ondansetron Injectable 4 milliGRAM(s) IV Push every 6 hours PRN Nausea and/or Vomiting      Allergies    No Known Allergies    Intolerances        SOCIAL HISTORY: Denies tobacco, etoh abuse or illicit drug use    FAMILY HISTORY:  No pertinent family history in first degree relatives        Vital Signs Last 24 Hrs  T(C): 36.8 (25 Sep 2021 08:48), Max: 37.3 (25 Sep 2021 00:30)  T(F): 98.2 (25 Sep 2021 08:48), Max: 99.1 (25 Sep 2021 00:30)  HR: 61 (25 Sep 2021 08:48) (58 - 84)  BP: 131/87 (25 Sep 2021 08:48) (108/63 - 151/95)  BP(mean): 74 (24 Sep 2021 19:00) (74 - 112)  RR: 16 (25 Sep 2021 08:48) (11 - 20)  SpO2: 96% (25 Sep 2021 08:48) (96% - 100%)        REVIEW OF SYSTEMS:    CONSTITUTIONAL:  As per HPI.  HEENT:  Eyes:  No diplopia or blurred vision. ENT:  No earache, sore throat or runny nose.  CARDIOVASCULAR:  No pressure, squeezing, tightness, heaviness or aching about the chest, neck, axilla or epigastrium.  RESPIRATORY:  No cough, +shortness of breath, PND or orthopnea.  GASTROINTESTINAL:  No nausea, vomiting or diarrhea.  GENITOURINARY:  No dysuria, frequency or urgency.  MUSCULOSKELETAL:  As per HPI.  SKIN:  No change in skin, hair or nails.  NEUROLOGIC:  No paresthesias, fasciculations, seizures or weakness.  PSYCHIATRIC:  No disorder of thought or mood.  ENDOCRINE:  No heat or cold intolerance, polyuria or polydipsia.  HEMATOLOGICAL:  No easy bruising or bleedings:  .     PHYSICAL EXAMINATION:    GENERAL APPEARANCE:  Pt. is not currently dyspneic, in no distress. Pt. is alert, oriented, and pleasant.  HEENT:  Pupils are normal and react normally. No icterus. Mucous membranes well colored.  NECK:  Supple. No lymphadenopathy. Jugular venous pressure not elevated. Carotids equal.   HEART:   The cardiac impulse has a normal quality. Regular. Normal S1 and S2. There are no murmurs, rubs or gallops noted  CHEST:  Chest crackles to auscultation. Normal respiratory effort.  ABDOMEN:  Soft and nontender.   EXTREMITIES:  There is no cyanosis, clubbing or edema.   SKIN:  No rash or significant lesions are noted.    LABS:                        10.7   9.11  )-----------( 255      ( 25 Sep 2021 08:35 )             32.2     09-24    134<L>  |  102  |  3<L>  ----------------------------<  78  3.7   |  24  |  0.49<L>    Ca    8.5      24 Sep 2021 06:53        PTT - ( 25 Sep 2021 08:35 )  PTT:33.3 sec    RADIOLOGY & ADDITIONAL STUDIES:     Xray Chest 1 View- PORTABLE-Urgent (09.18.21 @ 19:24) >  FINDINGS:    The lungs are clear of airspace consolidations or effusions. No pneumothorax.    The heart and mediastinum size and configuration are within normal limits.    Visualized osseous structures are intact.    IMPRESSION:   No radiographic evidence of active chest disease.      CT Abdomen and Pelvis w/ IV Cont (09.18.21 @ 19:49) >  IMPRESSION:  Acute interstitial edematous pancreatitis.    Hepatic steatosis.

## 2021-09-26 LAB
HCT VFR BLD CALC: 33.9 % — LOW (ref 39–50)
HGB BLD-MCNC: 11.5 G/DL — LOW (ref 13–17)
MCHC RBC-ENTMCNC: 27.3 PG — SIGNIFICANT CHANGE UP (ref 27–34)
MCHC RBC-ENTMCNC: 33.9 GM/DL — SIGNIFICANT CHANGE UP (ref 32–36)
MCV RBC AUTO: 80.5 FL — SIGNIFICANT CHANGE UP (ref 80–100)
PLATELET # BLD AUTO: 317 K/UL — SIGNIFICANT CHANGE UP (ref 150–400)
RBC # BLD: 4.21 M/UL — SIGNIFICANT CHANGE UP (ref 4.2–5.8)
RBC # FLD: 14.5 % — SIGNIFICANT CHANGE UP (ref 10.3–14.5)
WBC # BLD: 8.76 K/UL — SIGNIFICANT CHANGE UP (ref 3.8–10.5)
WBC # FLD AUTO: 8.76 K/UL — SIGNIFICANT CHANGE UP (ref 3.8–10.5)

## 2021-09-26 PROCEDURE — 99233 SBSQ HOSP IP/OBS HIGH 50: CPT

## 2021-09-26 RX ORDER — ISOSORBIDE MONONITRATE 60 MG/1
30 TABLET, EXTENDED RELEASE ORAL DAILY
Refills: 0 | Status: DISCONTINUED | OUTPATIENT
Start: 2021-09-26 | End: 2021-10-01

## 2021-09-26 RX ORDER — MORPHINE SULFATE 50 MG/1
4 CAPSULE, EXTENDED RELEASE ORAL EVERY 4 HOURS
Refills: 0 | Status: DISCONTINUED | OUTPATIENT
Start: 2021-09-26 | End: 2021-09-27

## 2021-09-26 RX ORDER — NITROGLYCERIN 6.5 MG
0.4 CAPSULE, EXTENDED RELEASE ORAL
Refills: 0 | Status: DISCONTINUED | OUTPATIENT
Start: 2021-09-26 | End: 2021-09-27

## 2021-09-26 RX ADMIN — Medication 1 MILLIGRAM(S): at 17:31

## 2021-09-26 RX ADMIN — Medication 81 MILLIGRAM(S): at 08:52

## 2021-09-26 RX ADMIN — Medication 0.5 MILLIGRAM(S): at 08:52

## 2021-09-26 RX ADMIN — MORPHINE SULFATE 4 MILLIGRAM(S): 50 CAPSULE, EXTENDED RELEASE ORAL at 09:37

## 2021-09-26 RX ADMIN — CLOPIDOGREL BISULFATE 75 MILLIGRAM(S): 75 TABLET, FILM COATED ORAL at 08:52

## 2021-09-26 RX ADMIN — MORPHINE SULFATE 4 MILLIGRAM(S): 50 CAPSULE, EXTENDED RELEASE ORAL at 10:00

## 2021-09-26 RX ADMIN — Medication 1 TABLET(S): at 08:52

## 2021-09-26 RX ADMIN — MORPHINE SULFATE 4 MILLIGRAM(S): 50 CAPSULE, EXTENDED RELEASE ORAL at 14:21

## 2021-09-26 RX ADMIN — MORPHINE SULFATE 4 MILLIGRAM(S): 50 CAPSULE, EXTENDED RELEASE ORAL at 02:03

## 2021-09-26 RX ADMIN — BUPROPION HYDROCHLORIDE 150 MILLIGRAM(S): 150 TABLET, EXTENDED RELEASE ORAL at 08:53

## 2021-09-26 RX ADMIN — ISOSORBIDE MONONITRATE 30 MILLIGRAM(S): 60 TABLET, EXTENDED RELEASE ORAL at 09:35

## 2021-09-26 RX ADMIN — Medication 1 PATCH: at 07:48

## 2021-09-26 RX ADMIN — ATORVASTATIN CALCIUM 80 MILLIGRAM(S): 80 TABLET, FILM COATED ORAL at 21:25

## 2021-09-26 RX ADMIN — Medication 1 MILLIGRAM(S): at 10:10

## 2021-09-26 RX ADMIN — MORPHINE SULFATE 4 MILLIGRAM(S): 50 CAPSULE, EXTENDED RELEASE ORAL at 01:33

## 2021-09-26 RX ADMIN — MORPHINE SULFATE 4 MILLIGRAM(S): 50 CAPSULE, EXTENDED RELEASE ORAL at 05:53

## 2021-09-26 RX ADMIN — MORPHINE SULFATE 4 MILLIGRAM(S): 50 CAPSULE, EXTENDED RELEASE ORAL at 06:23

## 2021-09-26 RX ADMIN — MORPHINE SULFATE 4 MILLIGRAM(S): 50 CAPSULE, EXTENDED RELEASE ORAL at 18:24

## 2021-09-26 RX ADMIN — MORPHINE SULFATE 4 MILLIGRAM(S): 50 CAPSULE, EXTENDED RELEASE ORAL at 13:57

## 2021-09-26 RX ADMIN — Medication 1 MILLIGRAM(S): at 09:36

## 2021-09-26 NOTE — PROGRESS NOTE ADULT - ASSESSMENT
PROBLEMS:    NSTEMI  NSVT 9/18 23/22  SOB  Acute Pancreatitis secondary to alcohol use  CT scan: Acute interstitial edematous pancreatitis-Hepatic steatosis  Alcohol dependence  Opiod /  benzo /  THC dependance    PLAN:    pulmonary no acute path supportive care  IV hydration  advance diet/ resolved  Alcohol withdrawal  keep on CIWA protocol dc stranding pt is too sedated  Ativan prn  folic acid, MVI, Thiamine  Fall and seizure precaution  DVT prophylasix

## 2021-09-26 NOTE — PROGRESS NOTE ADULT - NSPROGADDITIONALINFOA_GEN_ALL_CORE
I have personally interviewed and examined this patient, reviewed pertinent clinical information, and performed the evaluation and management services provided at today's visit for inpatient medical follow up    I am available to discuss any issues related to the medical care of this patient on the unit, or by phone at [phone]
I have personally interviewed and examined this patient, reviewed pertinent clinical information, and performed the evaluation and management services provided at today's visit for inpatient medical follow up    I am available to discuss any issues related to the medical care of this patient on the unit, or by phone at [phone]

## 2021-09-26 NOTE — SBIRT NOTE ADULT - NSSBIRTALCPOSREINDET_GEN_A_CORE
pt reports he was sober for 15 years and picked up drinking after his long term relationship with girlfriend broke up.  He drank for 2 weeks- beer daily; he reports he will not go back to drinking; smoked marijuana one time recently and will not  again.  pt suffered a heart attack recently and is concerned about having his health.  He prefers to engage in psychotherapy services.

## 2021-09-26 NOTE — PROGRESS NOTE ADULT - SUBJECTIVE AND OBJECTIVE BOX
Subjective:    pat better, lying in bed, CXR-09/18/21-neg    MEDICATIONS  (STANDING):  aspirin enteric coated 81 milliGRAM(s) Oral daily  atorvastatin 80 milliGRAM(s) Oral at bedtime  buPROPion XL (24-Hour) Oral Tab/Cap - Peds 150 milliGRAM(s) Oral daily  clopidogrel Tablet 75 milliGRAM(s) Oral daily  folic acid 1 milliGRAM(s) Oral daily  isosorbide   mononitrate ER Tablet (IMDUR) 30 milliGRAM(s) Oral daily  multivitamin 1 Tablet(s) Oral daily  nicotine -  14 mG/24Hr(s) Patch 1 patch Transdermal daily    MEDICATIONS  (PRN):  acetaminophen   Tablet .. 650 milliGRAM(s) Oral every 6 hours PRN Mild Pain (1 - 3)  heparin   Injectable 4100 Unit(s) IV Push every 6 hours PRN For aPTT less than 40  LORazepam   Injectable 1 milliGRAM(s) IV Push every 2 hours PRN if he wants to leave AMA  morphine  - Injectable 4 milliGRAM(s) IV Push every 4 hours PRN Severe Pain (7 - 10)  ondansetron Injectable 4 milliGRAM(s) IV Push every 6 hours PRN Nausea and/or Vomiting      Allergies    No Known Allergies    Intolerances        Vital Signs Last 24 Hrs  T(C): 36.7 (26 Sep 2021 08:10), Max: 37 (25 Sep 2021 20:30)  T(F): 98.1 (26 Sep 2021 08:10), Max: 98.6 (25 Sep 2021 20:30)  HR: 67 (26 Sep 2021 08:10) (62 - 72)  BP: 128/86 (26 Sep 2021 08:10) (120/79 - 151/87)  BP(mean): --  RR: 16 (26 Sep 2021 08:10) (16 - 20)  SpO2: 100% (26 Sep 2021 08:10) (97% - 100%)      PHYSICAL EXAMINATION:    NECK:  Supple. No lymphadenopathy. Jugular venous pressure not elevated. Carotids equal.   HEART:   The cardiac impulse has a normal quality. Reg., Nl S1 and S2.  There are no murmurs, rubs or gallops noted  CHEST:  Chest is clear to auscultation. Normal respiratory effort.  ABDOMEN:  Soft and nontender.   EXTREMITIES:  There is no edema.       LABS:                        11.5   8.76  )-----------( 317      ( 26 Sep 2021 09:26 )             33.9           PTT - ( 25 Sep 2021 08:35 )  PTT:33.3 sec

## 2021-09-26 NOTE — PROGRESS NOTE ADULT - PROBLEM SELECTOR PLAN 5
secondary to alcohol abuse  prn morphine  pt tolerating regular diet
secondary to alcohol abuse  prn morphine

## 2021-09-26 NOTE — PROGRESS NOTE ADULT - PROBLEM SELECTOR PLAN 3
seen by cardiology and underwent LHC, +arteriosclerotic small D2 branch no intervention due to risk of pt non-compliance  plavix loaded continue daily dose plavic  continue ASA, statin  no betablocker in setting of cocaine positive tox
seen by cardiology and underwent LHC, +arteriosclerotic small D2 branch no intervention due to risk of pt non-compliance  plavix loaded continue daily dose plavix  continue ASA, statin  no betablocker in setting of cocaine positive tox  9/26 add nitrates as pt has ongoing chest pain, unclear if there is pain seeking behavior as pt with admitted history of opiate dependence

## 2021-09-26 NOTE — PROGRESS NOTE ADULT - ASSESSMENT
56 yo M presented with epigastric pain, hx Alcohol dependence and acute pancreatitis 2/2 EtOH, NSTEMI and NSVT

## 2021-09-26 NOTE — PROGRESS NOTE ADULT - PROBLEM SELECTOR PLAN 6
pt adamant that he doesn't regularly use cocaine but not clear if he is reliable in history  pt could benefit from rehab 2/2 multi substance abuse  case management referral as above
pt could benefit from rehab 2/2 multi substance abuse  case management referral

## 2021-09-26 NOTE — PROGRESS NOTE ADULT - SUBJECTIVE AND OBJECTIVE BOX
Patient is a 55y old  Male who presents with a chief complaint of Abdominal pain (25 Sep 2021 13:07)    Pt continues to c/o chest pain and requesting morphine.  Denies N/V, still also w/ epigastric pain.    Pt adamant that he doesn't "abuse cocaine" and that he was with a girl smoking marijuana that he only "occasionally" does as well, and didn't know that the marijuana had cocaine in it.    Denies SOB    ALLERGIES:  No Known Allergies    MEDICATIONS  (STANDING):  aspirin enteric coated 81 milliGRAM(s) Oral daily  atorvastatin 80 milliGRAM(s) Oral at bedtime  buPROPion XL (24-Hour) Oral Tab/Cap - Peds 150 milliGRAM(s) Oral daily  clopidogrel Tablet 75 milliGRAM(s) Oral daily  folic acid 1 milliGRAM(s) Oral daily  LORazepam     Tablet   Oral   LORazepam     Tablet 0.5 milliGRAM(s) Oral every 12 hours  multivitamin 1 Tablet(s) Oral daily  nicotine -  14 mG/24Hr(s) Patch 1 patch Transdermal daily    MEDICATIONS  (PRN):  acetaminophen   Tablet .. 650 milliGRAM(s) Oral every 6 hours PRN Mild Pain (1 - 3)  heparin   Injectable 4100 Unit(s) IV Push every 6 hours PRN For aPTT less than 40  LORazepam   Injectable 1 milliGRAM(s) IV Push every 2 hours PRN if he wants to leave AMA  morphine  - Injectable 4 milliGRAM(s) IV Push every 4 hours PRN Severe Pain (7 - 10)  ondansetron Injectable 4 milliGRAM(s) IV Push every 6 hours PRN Nausea and/or Vomiting    Vital Signs Last 24 Hrs  T(F): 98.2 (26 Sep 2021 04:30), Max: 98.6 (25 Sep 2021 20:30)  HR: 68 (26 Sep 2021 05:50) (61 - 72)  BP: 129/94 (26 Sep 2021 05:50) (120/79 - 151/87)  RR: 17 (26 Sep 2021 05:50) (16 - 20)  SpO2: 100% (26 Sep 2021 05:50) (96% - 100%)  I&O's Summary    BMI (kg/m2): 24.9 (--21 @ 09:32)    PHYSICAL EXAM:  General: NAD, A/O x 3  ENT: MMM  Neck: Supple, No JVD  Lungs: Clear to auscultation bilaterally  Cardio: RRR, S1/S2, No murmurs  Abdomen: Soft, Nontender, Nondistended; Bowel sounds present  Extremities: No calf tenderness, No pitting edema      LABS:                        10.7   9.11  )-----------( 255      ( 25 Sep 2021 08:35 )             32.2           134  |  102  |  3   ----------------------------<  78  3.7   |  24  |  0.49    Ca    8.5      24 Sep 2021 06:53       eGFR if Non African American: 123 mL/min/1.73M2 (21 @ 06:53)  eGFR if : 143 mL/min/1.73M2 (21 @ 06:53)    PTT - ( 25 Sep 2021 08:35 )  PTT:33.3 sec          Chol 126 mg/dL LDL -- HDL 54 mg/dL Trig 75 mg/dL                COVID-19 PCR: NotDetec (21 @ 19:59)  COVID-19 PCR: NotDetec (21 @ 18:27)        CAPRINI SCORE [CLOT]    AGE RELATED RISK FACTORS                                                       MOBILITY RELATED FACTORS  [ X] Age 41-60 years                                            (1 Point)                  [ ] Bed rest                                                        (1 Point)  [ ] Age: 61-74 years                                           (2 Points)                 [ ] Plaster cast                                                   (2 Points)  [ ] Age= 75 years                                              (3 Points)                 [ ] Bed bound for more than 72 hours                 (2 Points)    DISEASE RELATED RISK FACTORS                                               GENDER SPECIFIC FACTORS  [ ] Edema in the lower extremities                       (1 Point)                  [ ] Pregnancy                                                     (1 Point)  [ ] Varicose veins                                               (1 Point)                  [ ] Post-partum < 6 weeks                                   (1 Point)             [ ] BMI > 25 Kg/m2                                            (1 Point)                  [ ] Hormonal therapy  or oral contraception          (1 Point)                 [ ] Sepsis (in the previous month)                        (1 Point)                  [ ] History of pregnancy complications                 (1 point)  [ ] Pneumonia or serious lung disease                                               [ ] Unexplained or recurrent                     (1 Point)           (in the previous month)                               (1 Point)  [ ] Abnormal pulmonary function test                     (1 Point)                 SURGERY RELATED RISK FACTORS  [X ] Acute myocardial infarction                              (1 Point)                 [ ]  Section                                             (1 Point)  [ ] Congestive heart failure (in the previous month)  (1 Point)               [ ] Minor surgery                                                  (1 Point)   [ ] Inflammatory bowel disease                             (1 Point)                 [ ] Arthroscopic surgery                                        (2 Points)  [ ] Central venous access                                      (2 Points)                [ ] General surgery lasting more than 45 minutes   (2 Points)       [ ] Stroke (in the previous month)                          (5 Points)               [ ] Elective arthroplasty                                         (5 Points)                                                                                                                                               HEMATOLOGY RELATED FACTORS                                                 TRAUMA RELATED RISK FACTORS  [ ] Prior episodes of VTE                                     (3 Points)                [ ] Fracture of the hip, pelvis, or leg                       (5 Points)  [ ] Positive family history for VTE                         (3 Points)                 [ ] Acute spinal cord injury (in the previous month)  (5 Points)  [ ] Prothrombin 33195 A                                     (3 Points)                 [ ] Paralysis  (less than 1 month)                             (5 Points)  [ ] Factor V Leiden                                             (3 Points)                  [ ] Multiple Trauma within 1 month                        (5 Points)  [ ] Lupus anticoagulants                                     (3 Points)                                                           [ ] Anticardiolipin antibodies                               (3 Points)                                                       [ ] High homocysteine in the blood                      (3 Points)                                             [ ] Other congenital or acquired thrombophilia      (3 Points)                                                [ ] Heparin induced thrombocytopenia                  (3 Points)                                          Total Score [    2      ]    Caprini Score 0 - 2:  Low Risk, No VTE Prophylaxis required for most patients, encourage ambulation  Caprini Score 3 - 6:  At Risk, pharmacologic VTE prophylaxis is indicated for most patients (in the absence of a contraindication)  Caprini Score Greater than or = 7:  High Risk, pharmacologic VTE prophylaxis is indicated for most patients (in the absence of a contraindication)

## 2021-09-26 NOTE — PROGRESS NOTE ADULT - PROBLEM SELECTOR PLAN 1
continue CIWA protocol, ativan  Pt offered counseling and advised follow up with alcohol dependence program such as AA  case management to be consulted to offer programs inpatient vs outpatient for polysubstance abuse
continue CIWA protocol, ativan

## 2021-09-26 NOTE — PROGRESS NOTE ADULT - PROBLEM SELECTOR PLAN 2
continue wellbutrin  on 1:1 2/2 flight risk not safe for discharge in setting of NSTEMI
continue wellbutrin  unclear if pain seeking or true cardiac chest pain as pt may not be  a reliable historian  on 1:1 2/2 flight risk not safe for discharge in setting of NSTEMI

## 2021-09-27 LAB
HCT VFR BLD CALC: 33.4 % — LOW (ref 39–50)
HGB BLD-MCNC: 11.1 G/DL — LOW (ref 13–17)
MCHC RBC-ENTMCNC: 27.1 PG — SIGNIFICANT CHANGE UP (ref 27–34)
MCHC RBC-ENTMCNC: 33.2 GM/DL — SIGNIFICANT CHANGE UP (ref 32–36)
MCV RBC AUTO: 81.5 FL — SIGNIFICANT CHANGE UP (ref 80–100)
PLATELET # BLD AUTO: 399 K/UL — SIGNIFICANT CHANGE UP (ref 150–400)
RBC # BLD: 4.1 M/UL — LOW (ref 4.2–5.8)
RBC # FLD: 14.8 % — HIGH (ref 10.3–14.5)
WBC # BLD: 9.56 K/UL — SIGNIFICANT CHANGE UP (ref 3.8–10.5)
WBC # FLD AUTO: 9.56 K/UL — SIGNIFICANT CHANGE UP (ref 3.8–10.5)

## 2021-09-27 PROCEDURE — 99232 SBSQ HOSP IP/OBS MODERATE 35: CPT

## 2021-09-27 RX ORDER — THIAMINE MONONITRATE (VIT B1) 100 MG
100 TABLET ORAL DAILY
Refills: 0 | Status: DISCONTINUED | OUTPATIENT
Start: 2021-09-27 | End: 2021-10-01

## 2021-09-27 RX ORDER — INFLUENZA VIRUS VACCINE 15; 15; 15; 15 UG/.5ML; UG/.5ML; UG/.5ML; UG/.5ML
0.5 SUSPENSION INTRAMUSCULAR ONCE
Refills: 0 | Status: DISCONTINUED | OUTPATIENT
Start: 2021-09-27 | End: 2021-10-01

## 2021-09-27 RX ORDER — HYDROMORPHONE HYDROCHLORIDE 2 MG/ML
1 INJECTION INTRAMUSCULAR; INTRAVENOUS; SUBCUTANEOUS EVERY 4 HOURS
Refills: 0 | Status: DISCONTINUED | OUTPATIENT
Start: 2021-09-27 | End: 2021-09-29

## 2021-09-27 RX ORDER — ENOXAPARIN SODIUM 100 MG/ML
40 INJECTION SUBCUTANEOUS DAILY
Refills: 0 | Status: DISCONTINUED | OUTPATIENT
Start: 2021-09-27 | End: 2021-10-01

## 2021-09-27 RX ADMIN — MORPHINE SULFATE 4 MILLIGRAM(S): 50 CAPSULE, EXTENDED RELEASE ORAL at 12:59

## 2021-09-27 RX ADMIN — HYDROMORPHONE HYDROCHLORIDE 1 MILLIGRAM(S): 2 INJECTION INTRAMUSCULAR; INTRAVENOUS; SUBCUTANEOUS at 21:28

## 2021-09-27 RX ADMIN — Medication 0.4 MILLIGRAM(S): at 01:26

## 2021-09-27 RX ADMIN — Medication 100 MILLIGRAM(S): at 18:40

## 2021-09-27 RX ADMIN — ISOSORBIDE MONONITRATE 30 MILLIGRAM(S): 60 TABLET, EXTENDED RELEASE ORAL at 09:00

## 2021-09-27 RX ADMIN — MORPHINE SULFATE 4 MILLIGRAM(S): 50 CAPSULE, EXTENDED RELEASE ORAL at 08:57

## 2021-09-27 RX ADMIN — HYDROMORPHONE HYDROCHLORIDE 1 MILLIGRAM(S): 2 INJECTION INTRAMUSCULAR; INTRAVENOUS; SUBCUTANEOUS at 21:50

## 2021-09-27 RX ADMIN — BUPROPION HYDROCHLORIDE 150 MILLIGRAM(S): 150 TABLET, EXTENDED RELEASE ORAL at 09:00

## 2021-09-27 RX ADMIN — ATORVASTATIN CALCIUM 80 MILLIGRAM(S): 80 TABLET, FILM COATED ORAL at 21:10

## 2021-09-27 RX ADMIN — Medication 0.4 MILLIGRAM(S): at 02:56

## 2021-09-27 RX ADMIN — Medication 81 MILLIGRAM(S): at 08:57

## 2021-09-27 RX ADMIN — MORPHINE SULFATE 4 MILLIGRAM(S): 50 CAPSULE, EXTENDED RELEASE ORAL at 00:20

## 2021-09-27 RX ADMIN — HYDROMORPHONE HYDROCHLORIDE 1 MILLIGRAM(S): 2 INJECTION INTRAMUSCULAR; INTRAVENOUS; SUBCUTANEOUS at 17:15

## 2021-09-27 RX ADMIN — MORPHINE SULFATE 4 MILLIGRAM(S): 50 CAPSULE, EXTENDED RELEASE ORAL at 04:38

## 2021-09-27 RX ADMIN — Medication 1 MILLIGRAM(S): at 08:57

## 2021-09-27 RX ADMIN — Medication 1 TABLET(S): at 08:57

## 2021-09-27 RX ADMIN — CLOPIDOGREL BISULFATE 75 MILLIGRAM(S): 75 TABLET, FILM COATED ORAL at 08:57

## 2021-09-27 NOTE — PHYSICAL THERAPY INITIAL EVALUATION ADULT - PERTINENT HX OF CURRENT PROBLEM, REHAB EVAL
56 yo Male presented with upper abdominal pain, gradually worsening x1wk. Patient states he has also has associated nausea, vomiting and occasional loose stools. Abdominal pain is mainly in the epigastric area, no radiation of the pain.Pt with NSTEMI. CARDIAC CATH +Single vessel CAD with 90% stenosis of D2.

## 2021-09-27 NOTE — PHYSICAL THERAPY INITIAL EVALUATION ADULT - ACTIVE RANGE OF MOTION EXAMINATION, REHAB EVAL
chanda. upper extremity Active ROM was WNL (within normal limits)/bilateral lower extremity Active ROM was WNL (within normal limits)

## 2021-09-27 NOTE — PROGRESS NOTE ADULT - SUBJECTIVE AND OBJECTIVE BOX
Patient is a 55y old  Male who presents with a chief complaint of Abdominal pain (25 Sep 2021 13:07)    Pt continues to c/o chest pain and requesting morphine.  Denies N/V, still also w/ epigastric pain.    Pt adamant that he doesn't "abuse cocaine" and that he was with a girl smoking marijuana that he only "occasionally" does as well, and didn't know that the marijuana had cocaine in it.    Denies SOB    9/27: c/o abd pain      PHYSICAL EXAM:     Vital Signs Last 24 Hrs  T(C): 36.4 (27 Sep 2021 15:18), Max: 37.2 (27 Sep 2021 09:00)  T(F): 97.6 (27 Sep 2021 15:18), Max: 98.9 (27 Sep 2021 09:00)  HR: 69 (27 Sep 2021 15:18) (57 - 81)  BP: 109/83 (27 Sep 2021 15:18) (95/55 - 116/76)  RR: 18 (27 Sep 2021 15:18) (16 - 18)  SpO2: 100% (27 Sep 2021 15:18) (98% - 100%)      Constitutional: Well appearing  HEENT: Atraumatic, [place], Normal, No congestion  Respiratory: Breath Sounds normal, no rhonchi/wheeze  Cardiovascular: N S1S2; BERT present  Gastrointestinal: Abdomen soft, non tender, Bowel Sounds present  Extremities: No edema, peripheral pulses present  Neurological: AAO x 3, no gross focal motor deficits  Skin: Non cellulitic, no rash, ulcers  Lymph Nodes: No lymphadenopathy noted  Back: No CVA tenderness   Musculoskeletal: non tender  Breasts: Deferred  Genitourinary: deferred  Rectal: Deferred                          11.1   9.56  )-----------( 399      ( 27 Sep 2021 08:46 )             33.4       CBC Full  -  ( 27 Sep 2021 08:46 )  WBC Count : 9.56 K/uL  RBC Count : 4.10 M/uL  Hemoglobin : 11.1 g/dL  Hematocrit : 33.4 %  Platelet Count - Automated : 399 K/uL  Mean Cell Volume : 81.5 fl  Mean Cell Hemoglobin : 27.1 pg  Mean Cell Hemoglobin Concentration : 33.2 gm/dL  Auto Neutrophil # : x  Auto Lymphocyte # : x  Auto Monocyte # : x  Auto Eosinophil # : x  Auto Basophil # : x  Auto Neutrophil % : x  Auto Lymphocyte % : x  Auto Monocyte % : x  Auto Eosinophil % : x  Auto Basophil % : x        MEDICATIONS  (STANDING):  aspirin enteric coated 81 milliGRAM(s) Oral daily  atorvastatin 80 milliGRAM(s) Oral at bedtime  buPROPion XL (24-Hour) Oral Tab/Cap - Peds 150 milliGRAM(s) Oral daily  clopidogrel Tablet 75 milliGRAM(s) Oral daily  enoxaparin Injectable 40 milliGRAM(s) SubCutaneous daily  folic acid 1 milliGRAM(s) Oral daily  influenza   Vaccine 0.5 milliLiter(s) IntraMuscular once  isosorbide   mononitrate ER Tablet (IMDUR) 30 milliGRAM(s) Oral daily  multivitamin 1 Tablet(s) Oral daily  nicotine -  14 mG/24Hr(s) Patch 1 patch Transdermal daily

## 2021-09-27 NOTE — PHYSICAL THERAPY INITIAL EVALUATION ADULT - PATIENT/FAMILY/SIGNIFICANT OTHER GOALS STATEMENT, PT EVAL
1810 Dr. Fernandez called in to check on another patient and was updated on this patient as to pacu events, albumin given and H&H results.  
Dr. Fernandez ok with patient having a POCT done for PT/INR result is 1.9. Dr. Fernandez informed of result in which he states surgery can proceed.   Order placed for POCT.   Lorenza Ruelas RN   
feel better

## 2021-09-27 NOTE — CHART NOTE - NSCHARTNOTEFT_GEN_A_CORE
HOSPITALIST PA CHART NOTE     Went to see patient with complaints of persistent CP/ abdominal pain. Patient presenting with abdominal pain, has acute pancreatitis secondary to alcohol abuse. Patient also noted to have + NSTEMI, seen by cardiology and underwent LHC, +arteriosclerotic small D2 branch no intervention due to risk of pt non-compliance. Patient was plavix loaded continue daily dose plavix, ASA, statin.   Patient has continued to complain of CP, potentially drug seeking as he has documented hx of opioid dependence. Patient had orders for prn Morphine, received call from RN as order had to be renewed and patient complaining of pain.   Also ordered nitroglycerin to alleviate chest pain, patient initially refused to take, only wanted the morphine. After taking the morphine he took a total of 2 doses of sublingual nitroglycerin.     Patient seen at bedside. Sleeping on my arrival. Upon waking, patient still complaining of pain. He states it's more in his abdomen. States the nitroglycerin did nothing to alleviate his pain, states the morphine is helping but doesn't last for a long time. He tells me when he is next due for more morphine. He is also inquiring about getting Ativan.     PE:  Gen- awake, alert, NAD, complaints out of proportion to physical appearance   Cardiac- normal s1, s2. RRR   Resp- CTA b/l, no labored or abnormal breathing, no signs of respiratory distress   Abdomen- soft, tender to general palpation. + bowel sounds   Extremities- no edema   Vascular- strong pulses present in upper and lower extremities  Skin- no diaphoresis, no pallor or cyanosis. Skin is warm, moist and intact. Normal cap refill < 2 sec    High suspicion of drug-seeking behavior as opposed to cardiac related pain. Complaints of CP is not new, and is unchanged. Not responsive to nitroglycerin. Patient already seen by cardio and underwent cath, with no intervention. Plavix loaded and on daily plavix statin and ASA. Will continue prn Morphine for abdominal pain 2/2 pancreatitis as ordered. IV Ativan not warranted for patient at this time. Patient remains on CiWA protocol, ativan prn per protocol. VSS, continue to monitor.     Vital Signs Last 24 Hrs  T(C): 36.8 (27 Sep 2021 04:30), Max: 36.9 (26 Sep 2021 15:09)  T(F): 98.2 (27 Sep 2021 04:30), Max: 98.5 (26 Sep 2021 20:30)  HR: 62 (27 Sep 2021 04:30) (62 - 81)  BP: 109/57 (27 Sep 2021 04:30) (95/55 - 129/94)  BP(mean): --  RR: 18 (27 Sep 2021 04:30) (16 - 18)  SpO2: 98% (27 Sep 2021 04:30) (98% - 100%)

## 2021-09-27 NOTE — PHYSICAL THERAPY INITIAL EVALUATION ADULT - ASR EQUIP NEEDS DISCH PT EVAL
possible walker, additional session required for appropriate AD assessment/rolling walker (5 inch wheels)

## 2021-09-27 NOTE — PROVIDER CONTACT NOTE (OTHER) - ASSESSMENT
Afebrile, HR 69, /69, O2 100% on room air.
patient remains as previously assessed. Afebrile. /63, HR 73, 99% on room air, RR 16. Describes pain as 10/10 chest/epigastric, also states "pain is from my pancreas". Pain is not new, same pain he has had during the duration of this admission.
currently in NSR in 70-80s

## 2021-09-27 NOTE — PROVIDER CONTACT NOTE (OTHER) - ACTION/TREATMENT ORDERED:
Morphine IV push order renewed, nitroglycerin SL 0.4 mg ordered.
Administer PRN morphine, cardiology already following patient. No new orders at this time.

## 2021-09-27 NOTE — PHYSICAL THERAPY INITIAL EVALUATION ADULT - ASR WT BEARING STATUS EVAL
IMAGING- CTAP: Acute interstitial edematous pancreatitis. Hepatic steatosis.; TTE:  The mitral valve leaflets appear thin and normal. Trace to Mild mitral regurgitation is present. EA reversal of the mitral inflow consistent with reduced compliance of the left ventricle. The aortic valve is trileaflet with thin pliable leaflets. Normal appearing tricuspid valve structure. Trace to Mild tricuspid valve regurgitation is present. Pulmonic valve not well seen. Trace pulmonic valvular regurgitation is present. Normal appearing left atrium. Estimated left ventricular ejection fraction is 50 %. The left ventricle is normal in size and wall thickness.  Wall motion abnormalities are noted with preserved LV systolic function.  Normal appearing right atrium.  Normal appearing right ventricle structure and function.; CARDIAC CATH:  Single vessel CAD with 90% stenosis of D2.

## 2021-09-27 NOTE — PHYSICAL THERAPY INITIAL EVALUATION ADULT - ADDITIONAL COMMENTS
as per CM note, pt lives alone in private home with 1 step to enter, 1 flight inside. Pt reports being indep without AD for amb and indep with all ADLs prior.

## 2021-09-27 NOTE — PROGRESS NOTE ADULT - SUBJECTIVE AND OBJECTIVE BOX
Subjective:    pat better, lying in bed, no new complaint.    MEDICATIONS  (STANDING):  aspirin enteric coated 81 milliGRAM(s) Oral daily  atorvastatin 80 milliGRAM(s) Oral at bedtime  buPROPion XL (24-Hour) Oral Tab/Cap - Peds 150 milliGRAM(s) Oral daily  clopidogrel Tablet 75 milliGRAM(s) Oral daily  enoxaparin Injectable 40 milliGRAM(s) SubCutaneous daily  folic acid 1 milliGRAM(s) Oral daily  influenza   Vaccine 0.5 milliLiter(s) IntraMuscular once  isosorbide   mononitrate ER Tablet (IMDUR) 30 milliGRAM(s) Oral daily  multivitamin 1 Tablet(s) Oral daily  nicotine -  14 mG/24Hr(s) Patch 1 patch Transdermal daily  thiamine 100 milliGRAM(s) Oral daily    MEDICATIONS  (PRN):  acetaminophen   Tablet .. 650 milliGRAM(s) Oral every 6 hours PRN Mild Pain (1 - 3)  HYDROmorphone  Injectable 1 milliGRAM(s) IV Push every 4 hours PRN Moderate Pain (4 - 6)  ondansetron Injectable 4 milliGRAM(s) IV Push every 6 hours PRN Nausea and/or Vomiting      Allergies    No Known Allergies    Intolerances        Vital Signs Last 24 Hrs  T(C): 36.4 (27 Sep 2021 15:18), Max: 37.2 (27 Sep 2021 09:00)  T(F): 97.6 (27 Sep 2021 15:18), Max: 98.9 (27 Sep 2021 09:00)  HR: 69 (27 Sep 2021 15:18) (57 - 81)  BP: 109/83 (27 Sep 2021 15:18) (95/55 - 116/76)  BP(mean): --  RR: 18 (27 Sep 2021 15:18) (16 - 18)  SpO2: 100% (27 Sep 2021 15:18) (98% - 100%)      PHYSICAL EXAMINATION:    NECK:  Supple. No lymphadenopathy. Jugular venous pressure not elevated. Carotids equal.   HEART:   The cardiac impulse has a normal quality. Reg., Nl S1 and S2.  There are no murmurs, rubs or gallops noted  CHEST:  Chest crackles to auscultation. Normal respiratory effort.  ABDOMEN:  Soft and nontender.   EXTREMITIES:  There is no edema.       LABS:                        11.1   9.56  )-----------( 399      ( 27 Sep 2021 08:46 )             33.4

## 2021-09-27 NOTE — PHYSICAL THERAPY INITIAL EVALUATION ADULT - PLANNED THERAPY INTERVENTIONS, PT EVAL
GOAL: Pt will perform 12 stairs with or without U HR as needed within 4weeks./balance training/gait training/transfer training

## 2021-09-27 NOTE — PHYSICAL THERAPY INITIAL EVALUATION ADULT - GENERAL OBSERVATIONS, REHAB EVAL
Pt seen for 45min PT Eval. Pt s/p EtOH, substance misuse, NSTEMI, +CIWA. Pt rec'd semi supine in bed c/o anxiety and emotional distress, support provided,  services offered. Pt trans supine>sit indep. Pt trans sit<>Stand with S, pt amb 20ft with S no AD, noted lateral sway, reports of feeling "slightly dizzy." Pt returned to bed, all needs met, DEVORA Rosales aware. Pt would benefit from additional PT session to assess for Appropriate AD.

## 2021-09-27 NOTE — PROGRESS NOTE ADULT - ASSESSMENT
55/M admitted with     Problem/Plan - 1:  ·  Problem: Alcohol abuse.   ·  Plan: completed CIWA protocol with ativan  Pt offered counseling and advised follow up with alcohol dependence program such as AA  case management to be consulted to offer programs inpatient vs outpatient for polysubstance abuse.    Problem/Plan - 2:  ·  Problem: Substance induced mood disorder.   ·  Plan: continue Wellbutrin  unclear if pain seeking or true cardiac chest pain as pt may not be  a reliable historian    Problem/Plan - 3:  ·  Problem: NSTEMI (non-ST elevation myocardial infarction).   ·  Plan: seen by cardiology and underwent LHC, +arteriosclerotic small D2 branch no intervention due to risk of pt non-compliance  plavix loaded continue daily dose plavix  continue ASA, statin  no betablocker in setting of cocaine positive tox  9/26 add nitrates as pt has ongoing chest pain, unclear if there is pain seeking behavior as pt with admitted history of opiate dependence.  cardio f/u    Problem/Plan - 4:  ·  Problem: NSVT (nonsustained ventricular tachycardia).   ·  Plan: 2/2 to NSTEMI, no recent recurrence.    Problem/Plan - 5:  ·  Problem: Acute pancreatitis.   ·  Plan: secondary to alcohol abuse  prn dilaudid  c/o abd pain  check CMP , lipase;   would do CT abdo/ pelvis with po and iv contrast if renal function is normal    Problem/Plan - 6:  ·  Problem: Cocaine abuse.   ·  Plan: pt adamant that he doesn't regularly use cocaine but not clear if he is reliable in history  pt could benefit from rehab 2/2 multi substance abuse  case management referral as above.    DVT PPX: lovenox

## 2021-09-27 NOTE — PROGRESS NOTE ADULT - ASSESSMENT
PROBLEMS:    NSTEMI  NSVT 9/18 23/22  SOB  Acute Pancreatitis secondary to alcohol use  CT scan: Acute interstitial edematous pancreatitis-Hepatic steatosis  Alcohol dependence  Opiod /  benzo /  THC dependance    PLAN:    pulmonary no acute path supportive care-fu prn  IV hydration  advance diet/ resolved  Alcohol withdrawal  keep on CIWA protocol dc stranding pt is too sedated  Ativan prn  folic acid, MVI, Thiamine  Fall and seizure precaution  DVT prophylasix

## 2021-09-28 LAB
ALBUMIN SERPL ELPH-MCNC: 3.2 G/DL — LOW (ref 3.3–5)
ALP SERPL-CCNC: 79 U/L — SIGNIFICANT CHANGE UP (ref 40–120)
ALT FLD-CCNC: 49 U/L — SIGNIFICANT CHANGE UP (ref 12–78)
ANION GAP SERPL CALC-SCNC: 6 MMOL/L — SIGNIFICANT CHANGE UP (ref 5–17)
AST SERPL-CCNC: 28 U/L — SIGNIFICANT CHANGE UP (ref 15–37)
BILIRUB SERPL-MCNC: 0.3 MG/DL — SIGNIFICANT CHANGE UP (ref 0.2–1.2)
BUN SERPL-MCNC: 6 MG/DL — LOW (ref 7–23)
CALCIUM SERPL-MCNC: 9.2 MG/DL — SIGNIFICANT CHANGE UP (ref 8.5–10.1)
CHLORIDE SERPL-SCNC: 99 MMOL/L — SIGNIFICANT CHANGE UP (ref 96–108)
CO2 SERPL-SCNC: 28 MMOL/L — SIGNIFICANT CHANGE UP (ref 22–31)
CREAT SERPL-MCNC: 0.78 MG/DL — SIGNIFICANT CHANGE UP (ref 0.5–1.3)
GLUCOSE SERPL-MCNC: 103 MG/DL — HIGH (ref 70–99)
LIDOCAIN IGE QN: 600 U/L — HIGH (ref 73–393)
MAGNESIUM SERPL-MCNC: 2 MG/DL — SIGNIFICANT CHANGE UP (ref 1.6–2.6)
PHOSPHATE SERPL-MCNC: 3.4 MG/DL — SIGNIFICANT CHANGE UP (ref 2.5–4.5)
POTASSIUM SERPL-MCNC: 3.6 MMOL/L — SIGNIFICANT CHANGE UP (ref 3.5–5.3)
POTASSIUM SERPL-SCNC: 3.6 MMOL/L — SIGNIFICANT CHANGE UP (ref 3.5–5.3)
PROT SERPL-MCNC: 7.5 GM/DL — SIGNIFICANT CHANGE UP (ref 6–8.3)
SODIUM SERPL-SCNC: 133 MMOL/L — LOW (ref 135–145)

## 2021-09-28 PROCEDURE — 74177 CT ABD & PELVIS W/CONTRAST: CPT | Mod: 26

## 2021-09-28 PROCEDURE — 99232 SBSQ HOSP IP/OBS MODERATE 35: CPT

## 2021-09-28 RX ORDER — ALPRAZOLAM 0.25 MG
0.5 TABLET ORAL THREE TIMES A DAY
Refills: 0 | Status: DISCONTINUED | OUTPATIENT
Start: 2021-09-28 | End: 2021-09-30

## 2021-09-28 RX ADMIN — Medication 81 MILLIGRAM(S): at 09:46

## 2021-09-28 RX ADMIN — HYDROMORPHONE HYDROCHLORIDE 1 MILLIGRAM(S): 2 INJECTION INTRAMUSCULAR; INTRAVENOUS; SUBCUTANEOUS at 01:36

## 2021-09-28 RX ADMIN — HYDROMORPHONE HYDROCHLORIDE 1 MILLIGRAM(S): 2 INJECTION INTRAMUSCULAR; INTRAVENOUS; SUBCUTANEOUS at 22:34

## 2021-09-28 RX ADMIN — ISOSORBIDE MONONITRATE 30 MILLIGRAM(S): 60 TABLET, EXTENDED RELEASE ORAL at 09:39

## 2021-09-28 RX ADMIN — HYDROMORPHONE HYDROCHLORIDE 1 MILLIGRAM(S): 2 INJECTION INTRAMUSCULAR; INTRAVENOUS; SUBCUTANEOUS at 23:00

## 2021-09-28 RX ADMIN — Medication 1 TABLET(S): at 09:46

## 2021-09-28 RX ADMIN — HYDROMORPHONE HYDROCHLORIDE 1 MILLIGRAM(S): 2 INJECTION INTRAMUSCULAR; INTRAVENOUS; SUBCUTANEOUS at 09:46

## 2021-09-28 RX ADMIN — CLOPIDOGREL BISULFATE 75 MILLIGRAM(S): 75 TABLET, FILM COATED ORAL at 09:46

## 2021-09-28 RX ADMIN — Medication 1 MILLIGRAM(S): at 09:46

## 2021-09-28 RX ADMIN — HYDROMORPHONE HYDROCHLORIDE 1 MILLIGRAM(S): 2 INJECTION INTRAMUSCULAR; INTRAVENOUS; SUBCUTANEOUS at 02:00

## 2021-09-28 RX ADMIN — Medication 100 MILLIGRAM(S): at 09:46

## 2021-09-28 RX ADMIN — ATORVASTATIN CALCIUM 80 MILLIGRAM(S): 80 TABLET, FILM COATED ORAL at 21:58

## 2021-09-28 RX ADMIN — HYDROMORPHONE HYDROCHLORIDE 1 MILLIGRAM(S): 2 INJECTION INTRAMUSCULAR; INTRAVENOUS; SUBCUTANEOUS at 18:27

## 2021-09-28 RX ADMIN — BUPROPION HYDROCHLORIDE 150 MILLIGRAM(S): 150 TABLET, EXTENDED RELEASE ORAL at 09:39

## 2021-09-28 RX ADMIN — Medication 0.5 MILLIGRAM(S): at 15:49

## 2021-09-28 RX ADMIN — HYDROMORPHONE HYDROCHLORIDE 1 MILLIGRAM(S): 2 INJECTION INTRAMUSCULAR; INTRAVENOUS; SUBCUTANEOUS at 05:25

## 2021-09-28 RX ADMIN — HYDROMORPHONE HYDROCHLORIDE 1 MILLIGRAM(S): 2 INJECTION INTRAMUSCULAR; INTRAVENOUS; SUBCUTANEOUS at 14:03

## 2021-09-28 NOTE — PROVIDER CONTACT NOTE (OTHER) - SITUATION
14 beats of vtach on telly monitor.
Dr. Lemon aware of pt.
called Dr Loco office to notify a routine consult.
notified office; spoke to Ryder
54 y/o male admitted for N/V, abdominal pain, NSTEMI on admission. Transferred to med surg 2330 from CCU. C/o chest pain and abdominal pain. Per patient this pain is not new as he has had it since adm
54 y/o M admitted for pancreatitis, upper abdominal pain. S/p NSTEMI and cardiac catheterization 9/24 this admission.

## 2021-09-28 NOTE — PROGRESS NOTE ADULT - SUBJECTIVE AND OBJECTIVE BOX
Patient is a 55y old  Male who presents with a chief complaint of Abdominal pain (28 Sep 2021 15:00)  9/28- " I feel much better after pain meds"  no chest pain     MEDICATIONS  (STANDING):  aspirin enteric coated 81 milliGRAM(s) Oral daily  atorvastatin 80 milliGRAM(s) Oral at bedtime  buPROPion XL (24-Hour) Oral Tab/Cap - Peds 150 milliGRAM(s) Oral daily  clopidogrel Tablet 75 milliGRAM(s) Oral daily  enoxaparin Injectable 40 milliGRAM(s) SubCutaneous daily  folic acid 1 milliGRAM(s) Oral daily  influenza   Vaccine 0.5 milliLiter(s) IntraMuscular once  isosorbide   mononitrate ER Tablet (IMDUR) 30 milliGRAM(s) Oral daily  multivitamin 1 Tablet(s) Oral daily  nicotine -  14 mG/24Hr(s) Patch 1 patch Transdermal daily  thiamine 100 milliGRAM(s) Oral daily    MEDICATIONS  (PRN):  acetaminophen   Tablet .. 650 milliGRAM(s) Oral every 6 hours PRN Mild Pain (1 - 3)  ALPRAZolam 0.5 milliGRAM(s) Oral three times a day PRN anxiety  HYDROmorphone  Injectable 1 milliGRAM(s) IV Push every 4 hours PRN Moderate Pain (4 - 6)  ondansetron Injectable 4 milliGRAM(s) IV Push every 6 hours PRN Nausea and/or Vomiting            Vital Signs Last 24 Hrs  T(C): 36.3 (28 Sep 2021 07:46), Max: 36.8 (27 Sep 2021 22:19)  T(F): 97.4 (28 Sep 2021 07:46), Max: 98.2 (27 Sep 2021 22:19)  HR: 68 (28 Sep 2021 07:46) (62 - 68)  BP: 123/64 (28 Sep 2021 07:46) (123/64 - 127/77)  BP(mean): --  RR: 18 (28 Sep 2021 07:46) (17 - 18)  SpO2: 100% (28 Sep 2021 07:46) (100% - 100%)            INTERPRETATION OF TELEMETRY:    ECG:        LABS:                        11.1   9.56  )-----------( 399      ( 27 Sep 2021 08:46 )             33.4     09-28    133<L>  |  99  |  6<L>  ----------------------------<  103<H>  3.6   |  28  |  0.78    Ca    9.2      28 Sep 2021 09:42  Phos  3.4     09-28  Mg     2.0     09-28    TPro  7.5  /  Alb  3.2<L>  /  TBili  0.3  /  DBili  x   /  AST  28  /  ALT  49  /  AlkPhos  79  09-28            I&O's Summary    BNP  RADIOLOGY & ADDITIONAL STUDIES:

## 2021-09-28 NOTE — PROGRESS NOTE ADULT - SUBJECTIVE AND OBJECTIVE BOX
Subjective:    pat better, lying in bed, no respiratory complaint.    MEDICATIONS  (STANDING):  aspirin enteric coated 81 milliGRAM(s) Oral daily  atorvastatin 80 milliGRAM(s) Oral at bedtime  buPROPion XL (24-Hour) Oral Tab/Cap - Peds 150 milliGRAM(s) Oral daily  clopidogrel Tablet 75 milliGRAM(s) Oral daily  enoxaparin Injectable 40 milliGRAM(s) SubCutaneous daily  folic acid 1 milliGRAM(s) Oral daily  influenza   Vaccine 0.5 milliLiter(s) IntraMuscular once  isosorbide   mononitrate ER Tablet (IMDUR) 30 milliGRAM(s) Oral daily  multivitamin 1 Tablet(s) Oral daily  nicotine -  14 mG/24Hr(s) Patch 1 patch Transdermal daily  thiamine 100 milliGRAM(s) Oral daily    MEDICATIONS  (PRN):  acetaminophen   Tablet .. 650 milliGRAM(s) Oral every 6 hours PRN Mild Pain (1 - 3)  ALPRAZolam 0.5 milliGRAM(s) Oral three times a day PRN anxiety  HYDROmorphone  Injectable 1 milliGRAM(s) IV Push every 4 hours PRN Moderate Pain (4 - 6)  ondansetron Injectable 4 milliGRAM(s) IV Push every 6 hours PRN Nausea and/or Vomiting      Allergies    No Known Allergies    Intolerances        Vital Signs Last 24 Hrs  T(C): 36.3 (28 Sep 2021 07:46), Max: 36.8 (27 Sep 2021 22:19)  T(F): 97.4 (28 Sep 2021 07:46), Max: 98.2 (27 Sep 2021 22:19)  HR: 68 (28 Sep 2021 07:46) (62 - 69)  BP: 123/64 (28 Sep 2021 07:46) (109/83 - 127/77)  BP(mean): --  RR: 18 (28 Sep 2021 07:46) (17 - 18)  SpO2: 100% (28 Sep 2021 07:46) (100% - 100%)      PHYSICAL EXAMINATION:    NECK:  Supple. No lymphadenopathy. Jugular venous pressure not elevated. Carotids equal.   HEART:   The cardiac impulse has a normal quality. Reg., Nl S1 and S2.  There are no murmurs, rubs or gallops noted  CHEST:  Chest is clear to auscultation. Normal respiratory effort.  ABDOMEN:  Soft and nontender.   EXTREMITIES:  There is no edema.       LABS:                        11.1   9.56  )-----------( 399      ( 27 Sep 2021 08:46 )             33.4     09-28    133<L>  |  99  |  6<L>  ----------------------------<  103<H>  3.6   |  28  |  0.78    Ca    9.2      28 Sep 2021 09:42  Phos  3.4     09-28  Mg     2.0     09-28    TPro  7.5  /  Alb  3.2<L>  /  TBili  0.3  /  DBili  x   /  AST  28  /  ALT  49  /  AlkPhos  79  09-28

## 2021-09-28 NOTE — PROGRESS NOTE ADULT - ASSESSMENT
55/M admitted with     Problem/Plan - 1:  ·  Problem: Alcohol abuse.   ·  Plan: completed CIWA protocol with ativan  Pt offered counseling and advised follow up with alcohol dependence program such as AA  case management to be consulted to offer programs inpatient vs outpatient for polysubstance abuse.    Problem/Plan - 2:  ·  Problem: Substance induced mood disorder.   ·  Plan: continue Wellbutrin  unclear if pain seeking or true cardiac chest pain as pt may not be  a reliable historian    Problem/Plan - 3:  ·  Problem: NSTEMI (non-ST elevation myocardial infarction).   ·  Plan: seen by cardiology and underwent LHC, +arteriosclerotic small D2 branch no intervention due to risk of pt non-compliance  plavix loaded continue daily dose plavix  continue ASA, statin  no betablocker in setting of cocaine positive tox  9/26 add nitrates as pt has ongoing chest pain, unclear if there is pain seeking behavior as pt with admitted history of opiate dependence.    Problem/Plan - 4:  ·  Problem: NSVT (nonsustained ventricular tachycardia).   ·  Plan: 2/2 to NSTEMI, no recent recurrence.    Problem/Plan - 5:  ·  Problem: Acute pancreatitis.   ·  Plan: secondary to alcohol abuse  prn dilaudid  c/o abd pain  check CMP , lipase;   would do CT abdo/ pelvis with po and iv contrast if renal function is normal  improving pancreatitis but annd pain present  GI consult requested; f/u    Problem/Plan - 6:  ·  Problem: Cocaine abuse.   ·  Plan: pt adamant that he doesn't regularly use cocaine but not clear if he is reliable in history  pt could benefit from rehab 2/2 multi substance abuse  case management referral as above.    DVT PPX: lovenox    dispo: f/u GI consult; d/c planning thereafter

## 2021-09-28 NOTE — PROVIDER CONTACT NOTE (OTHER) - DATE AND TIME:
28-Sep-2021 12:26
18-Sep-2021 22:39
18-Sep-2021 23:46
21-Sep-2021 17:30
26-Sep-2021 23:40
24-Sep-2021 23:50

## 2021-09-28 NOTE — PROGRESS NOTE ADULT - SUBJECTIVE AND OBJECTIVE BOX
Patient is a 55y old  Male who presents with a chief complaint of Abdominal pain (25 Sep 2021 13:07)    Pt continues to c/o chest pain and requesting morphine.  Denies N/V, still also w/ epigastric pain.    Pt adamant that he doesn't "abuse cocaine" and that he was with a girl smoking marijuana that he only "occasionally" does as well, and didn't know that the marijuana had cocaine in it.    Denies SOB    9/27: c/o abd pain    9/28: abd pain present  CT abdo done today; improving pancreatitis  GI eval  c/o anxiety; xanax started        PHYSICAL EXAM:    Vital Signs Last 24 Hrs  T(C): 36.3 (28 Sep 2021 07:46), Max: 36.8 (27 Sep 2021 22:19)  T(F): 97.4 (28 Sep 2021 07:46), Max: 98.2 (27 Sep 2021 22:19)  HR: 68 (28 Sep 2021 07:46) (62 - 68)  BP: 123/64 (28 Sep 2021 07:46) (123/64 - 127/77)  BP(mean): --  RR: 18 (28 Sep 2021 07:46) (17 - 18)  SpO2: 100% (28 Sep 2021 07:46) (100% - 100%)    Constitutional: Well appearing  HEENT: Atraumatic, [place]ITA, Normal, No congestion  Respiratory: Breath Sounds normal, no rhonchi/wheeze  Cardiovascular: N S1S2; BERT present  Gastrointestinal: Abdomen soft, non tender, Bowel Sounds present  Extremities: No edema, peripheral pulses present  Neurological: AAO x 3, no gross focal motor deficits  Skin: Non cellulitic, no rash, ulcers  Lymph Nodes: No lymphadenopathy noted  Back: No CVA tenderness   Musculoskeletal: non tender  Breasts: Deferred  Genitourinary: deferred  Rectal: Deferred             09-28    133<L>  |  99  |  6<L>  ----------------------------<  103<H>  3.6   |  28  |  0.78    Ca    9.2      28 Sep 2021 09:42  Phos  3.4     09-28  Mg     2.0     09-28    TPro  7.5  /  Alb  3.2<L>  /  TBili  0.3  /  DBili  x   /  AST  28  /  ALT  49  /  AlkPhos  79  09-28    CBC Full  -  ( 27 Sep 2021 08:46 )  WBC Count : 9.56 K/uL  RBC Count : 4.10 M/uL  Hemoglobin : 11.1 g/dL  Hematocrit : 33.4 %  Platelet Count - Automated : 399 K/uL  Mean Cell Volume : 81.5 fl  Mean Cell Hemoglobin : 27.1 pg  Mean Cell Hemoglobin Concentration : 33.2 gm/dL  Auto Neutrophil # : x  Auto Lymphocyte # : x  Auto Monocyte # : x  Auto Eosinophil # : x  Auto Basophil # : x  Auto Neutrophil % : x  Auto Lymphocyte % : x  Auto Monocyte % : x  Auto Eosinophil % : x  Auto Basophil % : x               11.1   9.56  )-----------( 399      ( 27 Sep 2021 08:46 )             33.4       CBC Full  -  ( 27 Sep 2021 08:46 )  WBC Count : 9.56 K/uL  RBC Count : 4.10 M/uL  Hemoglobin : 11.1 g/dL  Hematocrit : 33.4 %  Platelet Count - Automated : 399 K/uL  Mean Cell Volume : 81.5 fl  Mean Cell Hemoglobin : 27.1 pg  Mean Cell Hemoglobin Concentration : 33.2 gm/dL  Auto Neutrophil # : x  Auto Lymphocyte # : x  Auto Monocyte # : x  Auto Eosinophil # : x  Auto Basophil # : x  Auto Neutrophil % : x  Auto Lymphocyte % : x  Auto Monocyte % : x  Auto Eosinophil % : x  Auto Basophil % : x        MEDICATIONS  (STANDING):  aspirin enteric coated 81 milliGRAM(s) Oral daily  atorvastatin 80 milliGRAM(s) Oral at bedtime  buPROPion XL (24-Hour) Oral Tab/Cap - Peds 150 milliGRAM(s) Oral daily  clopidogrel Tablet 75 milliGRAM(s) Oral daily  enoxaparin Injectable 40 milliGRAM(s) SubCutaneous daily  folic acid 1 milliGRAM(s) Oral daily  influenza   Vaccine 0.5 milliLiter(s) IntraMuscular once  isosorbide   mononitrate ER Tablet (IMDUR) 30 milliGRAM(s) Oral daily  multivitamin 1 Tablet(s) Oral daily  nicotine -  14 mG/24Hr(s) Patch 1 patch Transdermal daily

## 2021-09-29 PROCEDURE — 99232 SBSQ HOSP IP/OBS MODERATE 35: CPT

## 2021-09-29 PROCEDURE — 99233 SBSQ HOSP IP/OBS HIGH 50: CPT

## 2021-09-29 RX ORDER — OXYCODONE HYDROCHLORIDE 5 MG/1
5 TABLET ORAL EVERY 4 HOURS
Refills: 0 | Status: DISCONTINUED | OUTPATIENT
Start: 2021-09-29 | End: 2021-09-30

## 2021-09-29 RX ORDER — TRAMADOL HYDROCHLORIDE 50 MG/1
50 TABLET ORAL EVERY 6 HOURS
Refills: 0 | Status: DISCONTINUED | OUTPATIENT
Start: 2021-09-29 | End: 2021-10-01

## 2021-09-29 RX ADMIN — Medication 1 TABLET(S): at 10:43

## 2021-09-29 RX ADMIN — HYDROMORPHONE HYDROCHLORIDE 1 MILLIGRAM(S): 2 INJECTION INTRAMUSCULAR; INTRAVENOUS; SUBCUTANEOUS at 06:41

## 2021-09-29 RX ADMIN — OXYCODONE HYDROCHLORIDE 5 MILLIGRAM(S): 5 TABLET ORAL at 16:39

## 2021-09-29 RX ADMIN — OXYCODONE HYDROCHLORIDE 5 MILLIGRAM(S): 5 TABLET ORAL at 17:33

## 2021-09-29 RX ADMIN — HYDROMORPHONE HYDROCHLORIDE 1 MILLIGRAM(S): 2 INJECTION INTRAMUSCULAR; INTRAVENOUS; SUBCUTANEOUS at 10:41

## 2021-09-29 RX ADMIN — TRAMADOL HYDROCHLORIDE 50 MILLIGRAM(S): 50 TABLET ORAL at 13:41

## 2021-09-29 RX ADMIN — Medication 81 MILLIGRAM(S): at 10:43

## 2021-09-29 RX ADMIN — Medication 0.5 MILLIGRAM(S): at 13:41

## 2021-09-29 RX ADMIN — HYDROMORPHONE HYDROCHLORIDE 1 MILLIGRAM(S): 2 INJECTION INTRAMUSCULAR; INTRAVENOUS; SUBCUTANEOUS at 02:34

## 2021-09-29 RX ADMIN — BUPROPION HYDROCHLORIDE 150 MILLIGRAM(S): 150 TABLET, EXTENDED RELEASE ORAL at 10:43

## 2021-09-29 RX ADMIN — ISOSORBIDE MONONITRATE 30 MILLIGRAM(S): 60 TABLET, EXTENDED RELEASE ORAL at 10:43

## 2021-09-29 RX ADMIN — Medication 1 MILLIGRAM(S): at 10:44

## 2021-09-29 RX ADMIN — ATORVASTATIN CALCIUM 80 MILLIGRAM(S): 80 TABLET, FILM COATED ORAL at 21:04

## 2021-09-29 RX ADMIN — OXYCODONE HYDROCHLORIDE 5 MILLIGRAM(S): 5 TABLET ORAL at 21:04

## 2021-09-29 RX ADMIN — Medication 100 MILLIGRAM(S): at 10:43

## 2021-09-29 RX ADMIN — CLOPIDOGREL BISULFATE 75 MILLIGRAM(S): 75 TABLET, FILM COATED ORAL at 10:44

## 2021-09-29 RX ADMIN — HYDROMORPHONE HYDROCHLORIDE 1 MILLIGRAM(S): 2 INJECTION INTRAMUSCULAR; INTRAVENOUS; SUBCUTANEOUS at 02:45

## 2021-09-29 RX ADMIN — OXYCODONE HYDROCHLORIDE 5 MILLIGRAM(S): 5 TABLET ORAL at 22:04

## 2021-09-29 NOTE — PROGRESS NOTE ADULT - SUBJECTIVE AND OBJECTIVE BOX
Patient is a 55y old  Male who presents with a chief complaint of Abdominal pain (25 Sep 2021 13:07)    Pt continues to c/o chest pain and requesting morphine.  Denies N/V, still also w/ epigastric pain.    Pt adamant that he doesn't "abuse cocaine" and that he was with a girl smoking marijuana that he only "occasionally" does as well, and didn't know that the marijuana had cocaine in it.    Denies SOB    9/27: c/o abd pain    9/28: abd pain present  CT abdo done today; improving pancreatitis  GI eval  c/o anxiety; xanax started        PHYSICAL EXAM:    T(C): 36.8 (09-29-21 @ 16:43), Max: 36.8 (09-29-21 @ 16:43)  HR: 72 (09-29-21 @ 16:43) (66 - 72)  BP: 104/81 (09-29-21 @ 16:43) (104/81 - 136/76)  RR: 18 (09-29-21 @ 16:43) (16 - 18)  SpO2: 100% (09-29-21 @ 16:43) (100% - 100%)    Constitutional: Well appearing  HEENT: Atraumatic, [place]ITA, Normal, No congestion  Respiratory: Breath Sounds normal, no rhonchi/wheeze  Cardiovascular: N S1S2; BERT present  Gastrointestinal: Abdomen soft, non tender, Bowel Sounds present  Extremities: No edema, peripheral pulses present  Neurological: AAO x 3, no gross focal motor deficits  Skin: Non cellulitic, no rash, ulcers  Lymph Nodes: No lymphadenopathy noted  Back: No CVA tenderness   Musculoskeletal: non tender  Breasts: Deferred  Genitourinary: deferred  Rectal: Deferred      09-28    133<L>  |  99  |  6<L>  ----------------------------<  103<H>  3.6   |  28  |  0.78    Ca    9.2      28 Sep 2021 09:42  Phos  3.4     09-28  Mg     2.0     09-28    TPro  7.5  /  Alb  3.2<L>  /  TBili  0.3  /  DBili  x   /  AST  28  /  ALT  49  /  AlkPhos  79  09-28    COVID-19 PCR: NotDetec (25 Sep 2021 19:59)  COVID-19 PCR: NotDetec (18 Sep 2021 18:27)    CAPILLARY BLOOD GLUCOSE                   09-28    133<L>  |  99  |  6<L>  ----------------------------<  103<H>  3.6   |  28  |  0.78    Ca    9.2      28 Sep 2021 09:42  Phos  3.4     09-28  Mg     2.0     09-28    TPro  7.5  /  Alb  3.2<L>  /  TBili  0.3  /  DBili  x   /  AST  28  /  ALT  49  /  AlkPhos  79  09-28    CBC Full  -  ( 27 Sep 2021 08:46 )  WBC Count : 9.56 K/uL  RBC Count : 4.10 M/uL  Hemoglobin : 11.1 g/dL  Hematocrit : 33.4 %  Platelet Count - Automated : 399 K/uL  Mean Cell Volume : 81.5 fl  Mean Cell Hemoglobin : 27.1 pg  Mean Cell Hemoglobin Concentration : 33.2 gm/dL  Auto Neutrophil # : x  Auto Lymphocyte # : x  Auto Monocyte # : x  Auto Eosinophil # : x  Auto Basophil # : x  Auto Neutrophil % : x  Auto Lymphocyte % : x  Auto Monocyte % : x  Auto Eosinophil % : x  Auto Basophil % : x               11.1   9.56  )-----------( 399      ( 27 Sep 2021 08:46 )             33.4       CBC Full  -  ( 27 Sep 2021 08:46 )  WBC Count : 9.56 K/uL  RBC Count : 4.10 M/uL  Hemoglobin : 11.1 g/dL  Hematocrit : 33.4 %  Platelet Count - Automated : 399 K/uL  Mean Cell Volume : 81.5 fl  Mean Cell Hemoglobin : 27.1 pg  Mean Cell Hemoglobin Concentration : 33.2 gm/dL  Auto Neutrophil # : x  Auto Lymphocyte # : x  Auto Monocyte # : x  Auto Eosinophil # : x  Auto Basophil # : x  Auto Neutrophil % : x  Auto Lymphocyte % : x  Auto Monocyte % : x  Auto Eosinophil % : x  Auto Basophil % : x        MEDICATIONS  (STANDING):  aspirin enteric coated 81 milliGRAM(s) Oral daily  atorvastatin 80 milliGRAM(s) Oral at bedtime  buPROPion XL (24-Hour) Oral Tab/Cap - Peds 150 milliGRAM(s) Oral daily  clopidogrel Tablet 75 milliGRAM(s) Oral daily  enoxaparin Injectable 40 milliGRAM(s) SubCutaneous daily  folic acid 1 milliGRAM(s) Oral daily  influenza   Vaccine 0.5 milliLiter(s) IntraMuscular once  isosorbide   mononitrate ER Tablet (IMDUR) 30 milliGRAM(s) Oral daily  multivitamin 1 Tablet(s) Oral daily  nicotine -  14 mG/24Hr(s) Patch 1 patch Transdermal daily       Patient is a 55y old  Male who presents with a chief complaint of Abdominal pain (25 Sep 2021 13:07)    Pt continues to c/o chest pain and requesting morphine.  Denies N/V, still also w/ epigastric pain.    Pt adamant that he doesn't "abuse cocaine" and that he was with a girl smoking marijuana that he only "occasionally" does as well, and didn't know that the marijuana had cocaine in it.    Denies SOB    9/27: c/o abd pain    9/28: abd pain present  CT abdo done today; improving pancreatitis  GI eval  c/o anxiety; xanax started    9/29: Pain improved but requesting pain medicatino ordered around the clock. very anxious.         PHYSICAL EXAM:    T(C): 36.5 (09-30-21 @ 21:07), Max: 36.5 (09-30-21 @ 21:07)  HR: 65 (09-30-21 @ 21:07) (59 - 70)  BP: 126/68 (09-30-21 @ 21:07) (113/78 - 140/69)  RR: 16 (09-30-21 @ 21:07) (16 - 18)  SpO2: 100% (09-30-21 @ 21:07) (99% - 100%)    Constitutional: Well appearing  HEENT: Atraumatic, SANJUANITA, Normal, No congestion  Respiratory: Breath Sounds normal, no rhonchi/wheeze  Cardiovascular: N S1S2; BERT present  Gastrointestinal: Abdomen soft, non tender, Bowel Sounds present  Extremities: No edema, peripheral pulses present  Neurological: AAO x 3, no gross focal motor deficits  Skin: Non cellulitic, no rash, ulcers  Lymph Nodes: No lymphadenopathy noted  Back: No CVA tenderness   Musculoskeletal: non tender  Breasts: Deferred  Genitourinary: deferred  Rectal: Deferred      09-28    133<L>  |  99  |  6<L>  ----------------------------<  103<H>  3.6   |  28  |  0.78    Ca    9.2      28 Sep 2021 09:42  Phos  3.4     09-28  Mg     2.0     09-28    TPro  7.5  /  Alb  3.2<L>  /  TBili  0.3  /  DBili  x   /  AST  28  /  ALT  49  /  AlkPhos  79  09-28    COVID-19 PCR: NotDetec (25 Sep 2021 19:59)  COVID-19 PCR: NotDetec (18 Sep 2021 18:27)    CAPILLARY BLOOD GLUCOSE                   09-28    133<L>  |  99  |  6<L>  ----------------------------<  103<H>  3.6   |  28  |  0.78    Ca    9.2      28 Sep 2021 09:42  Phos  3.4     09-28  Mg     2.0     09-28    TPro  7.5  /  Alb  3.2<L>  /  TBili  0.3  /  DBili  x   /  AST  28  /  ALT  49  /  AlkPhos  79  09-28    CBC Full  -  ( 27 Sep 2021 08:46 )  WBC Count : 9.56 K/uL  RBC Count : 4.10 M/uL  Hemoglobin : 11.1 g/dL  Hematocrit : 33.4 %  Platelet Count - Automated : 399 K/uL  Mean Cell Volume : 81.5 fl  Mean Cell Hemoglobin : 27.1 pg  Mean Cell Hemoglobin Concentration : 33.2 gm/dL  Auto Neutrophil # : x  Auto Lymphocyte # : x  Auto Monocyte # : x  Auto Eosinophil # : x  Auto Basophil # : x  Auto Neutrophil % : x  Auto Lymphocyte % : x  Auto Monocyte % : x  Auto Eosinophil % : x  Auto Basophil % : x               11.1   9.56  )-----------( 399      ( 27 Sep 2021 08:46 )             33.4       CBC Full  -  ( 27 Sep 2021 08:46 )  WBC Count : 9.56 K/uL  RBC Count : 4.10 M/uL  Hemoglobin : 11.1 g/dL  Hematocrit : 33.4 %  Platelet Count - Automated : 399 K/uL  Mean Cell Volume : 81.5 fl  Mean Cell Hemoglobin : 27.1 pg  Mean Cell Hemoglobin Concentration : 33.2 gm/dL  Auto Neutrophil # : x  Auto Lymphocyte # : x  Auto Monocyte # : x  Auto Eosinophil # : x  Auto Basophil # : x  Auto Neutrophil % : x  Auto Lymphocyte % : x  Auto Monocyte % : x  Auto Eosinophil % : x  Auto Basophil % : x        MEDICATIONS  (STANDING):  aspirin enteric coated 81 milliGRAM(s) Oral daily  atorvastatin 80 milliGRAM(s) Oral at bedtime  buPROPion XL (24-Hour) Oral Tab/Cap - Peds 150 milliGRAM(s) Oral daily  clopidogrel Tablet 75 milliGRAM(s) Oral daily  enoxaparin Injectable 40 milliGRAM(s) SubCutaneous daily  folic acid 1 milliGRAM(s) Oral daily  influenza   Vaccine 0.5 milliLiter(s) IntraMuscular once  isosorbide   mononitrate ER Tablet (IMDUR) 30 milliGRAM(s) Oral daily  multivitamin 1 Tablet(s) Oral daily  nicotine -  14 mG/24Hr(s) Patch 1 patch Transdermal daily

## 2021-09-29 NOTE — PROGRESS NOTE BEHAVIORAL HEALTH - SUMMARY
Pt is a 55 yowM with hx significant od depression and EtOH abuse, one SA 20 years ago with hospitalization in [facility] at hat time, and brief outpatient f/u.   Pt denies  being psych after that. He was admitted with upper abdominal pain, he has a hx of alcoholic pancreatitis years ago. No past abdominal surgical Hx. He is   + EtOH abuser, has 3-4 drinks QD. Occasional+Marijuana use also.  Pt states he is depressed and sometimes has insomnia, appetite is poor. He is NPO at this time. Energy is low. Pt denies current  hopelessness or SI. No HI, AH, VH, PI.    PT has plans for future and wants help. Would like to see  for EtOH once d/vincenzo.   Pt is wiling to try antidepressant.    9.29.2021 - Patient presenting with mood incongruent symptoms, complaining of significant anxiety however not appearing to be anxious. Patient has tangential thoughts at times, but redirectable, many times voluntarily. Patient is not manic / psychotic. Patient denying depressive symptoms. Patient wanting on-going psychotropic management. Of note, patient out of psychiatric treatment for ~ 15 years and has been stable, without need for in-patient hospitalization. Patient to benefit from out-patient psychiatric treatment I.e. referral to [facility].     PLAN  1. Wellbutrin  mg daily   2. Trazodone 50 mg at bedtime  3. Melatonin 3 mg at bedtime  4. Patient symptoms not indicating imminent risk for harm to self; not warranting involuntary in-patient hospitalization: patient safe for discharge with referral to [facility] with on going psychotropic management.
Pt is a 55 yowM with hx significant od depression and EtOH abuse, one SA 20 years ago with hospitalization in [facility] at hat time, and brief outpatient f/u.   Pt denies  being psych after that. He was admitted with upper abdominal pain, he has a hx of alcoholic pancreatitis years ago. No past abdominal surgical Hx. He is   + EtOH abuser, has 3-4 drinks QD. Occasional+Marijuana use also.  Pt states he is depressed and sometimes has insomnia, appetite is poor. He is NPO at this time. Energy is low. Pt denies current  hopelessness or SI. No HI, AH, VH, PI.    PT has plans for future and wants help. Would like to see  for EtOH once d/vincenzo.   Pt is wiling to try antidepressant.    Pt does not need inpatient psych level of care.   He is wiling to try antidepressant:  started  Wellbutrin  mg in the AM.     Pt would benefit from rehab or alteratively counseling and meds management  in community  e/g,. refer to [facility].

## 2021-09-29 NOTE — PROGRESS NOTE ADULT - SUBJECTIVE AND OBJECTIVE BOX
GI consult    HPI:  56 yo Male presented with upper abdominal pain, gradually worsening over the past week. Patient states he has also has associated nausea, vomiting and occasional loose stools. Abdominal pain is mainly in the epigastric area, no radiation of the pain. States symptoms seem similar to prior alcoholic pancreatitis years ago. No past abdominal surgical Hx. He is a +Chronic Drinker, had 3-4 drinks today. Occasional+Marijuana use also. Denies any chest pain, sob, dizziness or headache.  (18 Sep 2021 23:35)    Pt found to have pancreatitis, states was on 2 week alcohol binge. Abdominal pain improving. Main complaint is constant left chest pain. No n/v currently (has chronic n/v) and cordell reg diet. Lipase decreased from ~3000 to 600. CT shows improving interstitial pancreatitis. LFTs unrevealing.       PAST MEDICAL & SURGICAL HISTORY:  Mastocytosis    Pancreatitis    Opioid dependence    No significant past surgical history        Home Medications:      MEDICATIONS  (STANDING):  aspirin enteric coated 81 milliGRAM(s) Oral daily  atorvastatin 80 milliGRAM(s) Oral at bedtime  buPROPion XL (24-Hour) Oral Tab/Cap - Peds 150 milliGRAM(s) Oral daily  clopidogrel Tablet 75 milliGRAM(s) Oral daily  enoxaparin Injectable 40 milliGRAM(s) SubCutaneous daily  folic acid 1 milliGRAM(s) Oral daily  influenza   Vaccine 0.5 milliLiter(s) IntraMuscular once  isosorbide   mononitrate ER Tablet (IMDUR) 30 milliGRAM(s) Oral daily  multivitamin 1 Tablet(s) Oral daily  nicotine -  14 mG/24Hr(s) Patch 1 patch Transdermal daily  thiamine 100 milliGRAM(s) Oral daily    MEDICATIONS  (PRN):  acetaminophen   Tablet .. 650 milliGRAM(s) Oral every 6 hours PRN Mild Pain (1 - 3)  ALPRAZolam 0.5 milliGRAM(s) Oral three times a day PRN anxiety  HYDROmorphone  Injectable 1 milliGRAM(s) IV Push every 4 hours PRN Moderate Pain (4 - 6)  ondansetron Injectable 4 milliGRAM(s) IV Push every 6 hours PRN Nausea and/or Vomiting      Allergies    No Known Allergies    Intolerances        SOCIAL HISTORY: polysubstance abuse    FAMILY HISTORY:  No pertinent family history in first degree relatives        ROS  As above  Otherwise unremarkable, all systems reviewed    PE:  Vital Signs Last 24 Hrs  T(C): 36.6 (29 Sep 2021 07:24), Max: 36.7 (28 Sep 2021 20:12)  T(F): 97.8 (29 Sep 2021 07:24), Max: 98 (28 Sep 2021 20:12)  HR: 70 (29 Sep 2021 07:24) (66 - 70)  BP: 131/91 (29 Sep 2021 07:24) (131/91 - 136/76)  BP(mean): --  RR: 18 (29 Sep 2021 07:24) (16 - 18)  SpO2: 100% (29 Sep 2021 07:24) (100% - 100%)    Constitutional: NAD, well-developed, A+Ox3  Anicteric   Respiratory: CTABL, breathing comfortably  Cardiovascular: S1 and S2, RRR  Gastrointestinal: +BS, soft, mildly tender epigastrium, non distended, no mass  Extremities: warm, well perfused, no edema  Psychiatric: Normal mood, abnormal affect  Neuro: moves all extremities, grossly intact  Skin: No rashes or lesions    LABS:    09-28    133<L>  |  99  |  6<L>  ----------------------------<  103<H>  3.6   |  28  |  0.78    Ca    9.2      28 Sep 2021 09:42  Phos  3.4     09-28  Mg     2.0     09-28    TPro  7.5  /  Alb  3.2<L>  /  TBili  0.3  /  DBili  x   /  AST  28  /  ALT  49  /  AlkPhos  79  09-28      LIVER FUNCTIONS - ( 28 Sep 2021 09:42 )  Alb: 3.2 g/dL / Pro: 7.5 gm/dL / ALK PHOS: 79 U/L / ALT: 49 U/L / AST: 28 U/L / GGT: x             RADIOLOGY & ADDITIONAL STUDIES:      EXAM:  CT ABDOMEN AND PELVIS IC                            PROCEDURE DATE:  09/28/2021          INTERPRETATION:  CLINICAL INFORMATION: Pancreatitis with persistent abdominal pain    COMPARISON: CT abdomen pelvis 9/18/2021    CONTRAST/COMPLICATIONS:  IV Contrast: Omnipaque 350  90 cc administered   10 cc discarded  Oral Contrast: Water  Complications: None reported at time of study completion    PROCEDURE:  CT of the Abdomen and Pelvis was performed.  Arterial and Portal Venous phases were acquired.  Sagittal and coronal reformats were performed.    FINDINGS:  LOWER CHEST: Clear.    LIVER: Diffuse steatosis.  BILE DUCTS: Nondilated.  GALLBLADDER: Normal.  SPLEEN: Normal.  PANCREAS: Persistent but improving inflammatory changes in association with interstitial edematous pancreatitis. No duct dilatation, collection, or mass.  ADRENALS: Normal.  KIDNEYS/URETERS: Bilateral nonobstructing renal calculi.    BLADDER: Underdistended limiting evaluation.  REPRODUCTIVE ORGANS: Enlarged prostate.    BOWEL: No bowel-related abnormality.  PERITONEUM: No free air or ascites.  VESSELS: Normal caliber aorta.  RETROPERITONEUM/LYMPH NODES: No adenopathy.  ABDOMINAL WALL: Normal.  BONES: No acute bony abnormality.    IMPRESSION:  *  Persistent but improving inflammatory changes in association with interstitial edematous pancreatitis. No duct dilatation, collection, or mass.      --- End of Report ---            NARGIS NAYLOR MD; Attending Radiologist  This document has been electronically signed. Sep 28 2021  2:12PM

## 2021-09-29 NOTE — PROGRESS NOTE BEHAVIORAL HEALTH - NSBHCHARTREVIEWVS_PSY_A_CORE FT
Per Dr Leon, no refills given d/t negative HSV swab.  
Vital Signs Last 24 Hrs  T(C): 36.6 (29 Sep 2021 07:24), Max: 36.7 (28 Sep 2021 20:12)  T(F): 97.8 (29 Sep 2021 07:24), Max: 98 (28 Sep 2021 20:12)  HR: 70 (29 Sep 2021 07:24) (66 - 70)  BP: 131/91 (29 Sep 2021 07:24) (131/91 - 136/76)  BP(mean): --  RR: 18 (29 Sep 2021 07:24) (16 - 18)  SpO2: 100% (29 Sep 2021 07:24) (100% - 100%)
Vital Signs Last 24 Hrs  T(C): 36.7 (22 Sep 2021 12:00), Max: 37.4 (22 Sep 2021 07:00)  T(F): 98 (22 Sep 2021 12:00), Max: 99.3 (22 Sep 2021 07:00)  HR: 89 (22 Sep 2021 14:00) (57 - 104)  BP: 90/73 (22 Sep 2021 14:00) (90/73 - 137/82)  BP(mean): 77 (22 Sep 2021 14:00) (66 - 101)  RR: 17 (22 Sep 2021 14:00) (9 - 28)  SpO2: 97% (22 Sep 2021 12:00) (96% - 100%)

## 2021-09-29 NOTE — PROGRESS NOTE BEHAVIORAL HEALTH - NSBHCONSULTFOLLOWAFTERCARE_PSY_A_CORE FT
Pt would benefit from rehab or alteratively counseling and meds management  in community  e/g,. refer to [facility].
Pt would benefit from rehab or alteratively counseling and meds management  in community  e/g,. refer to [facility].

## 2021-09-29 NOTE — PROGRESS NOTE ADULT - ASSESSMENT
55M polysubstance abuse, EtOH abuse a/w alcoholic pancreatitis and MI.    Improving pancreatitis.  Avoid EtOH  Low fat diet  No specific intervention needed.  d/w pt in detail.

## 2021-09-29 NOTE — PROGRESS NOTE ADULT - SUBJECTIVE AND OBJECTIVE BOX
Subjective:    pat better, difficulty sleepy, no respiratory complaint.    MEDICATIONS  (STANDING):  aspirin enteric coated 81 milliGRAM(s) Oral daily  atorvastatin 80 milliGRAM(s) Oral at bedtime  buPROPion XL (24-Hour) Oral Tab/Cap - Peds 150 milliGRAM(s) Oral daily  clopidogrel Tablet 75 milliGRAM(s) Oral daily  enoxaparin Injectable 40 milliGRAM(s) SubCutaneous daily  folic acid 1 milliGRAM(s) Oral daily  influenza   Vaccine 0.5 milliLiter(s) IntraMuscular once  isosorbide   mononitrate ER Tablet (IMDUR) 30 milliGRAM(s) Oral daily  multivitamin 1 Tablet(s) Oral daily  nicotine -  14 mG/24Hr(s) Patch 1 patch Transdermal daily  thiamine 100 milliGRAM(s) Oral daily    MEDICATIONS  (PRN):  acetaminophen   Tablet .. 650 milliGRAM(s) Oral every 6 hours PRN Mild Pain (1 - 3)  ALPRAZolam 0.5 milliGRAM(s) Oral three times a day PRN anxiety  ondansetron Injectable 4 milliGRAM(s) IV Push every 6 hours PRN Nausea and/or Vomiting  oxyCODONE    IR 5 milliGRAM(s) Oral every 4 hours PRN Severe Pain (7 - 10)  traMADol 50 milliGRAM(s) Oral every 6 hours PRN Moderate Pain (4 - 6)      Allergies    No Known Allergies    Intolerances        Vital Signs Last 24 Hrs  T(C): 36.8 (29 Sep 2021 16:43), Max: 36.8 (29 Sep 2021 16:43)  T(F): 98.2 (29 Sep 2021 16:43), Max: 98.2 (29 Sep 2021 16:43)  HR: 72 (29 Sep 2021 16:43) (66 - 72)  BP: 104/81 (29 Sep 2021 16:43) (104/81 - 136/76)  BP(mean): --  RR: 18 (29 Sep 2021 16:43) (16 - 18)  SpO2: 100% (29 Sep 2021 16:43) (100% - 100%)      PHYSICAL EXAMINATION:    NECK:  Supple. No lymphadenopathy. Jugular venous pressure not elevated. Carotids equal.   HEART:   The cardiac impulse has a normal quality. Reg., Nl S1 and S2.  There are no murmurs, rubs or gallops noted  CHEST:  Chest crackles to auscultation. Normal respiratory effort.  ABDOMEN:  Soft and nontender.   EXTREMITIES:  There is no edema.       LABS:    09-28    133<L>  |  99  |  6<L>  ----------------------------<  103<H>  3.6   |  28  |  0.78    Ca    9.2      28 Sep 2021 09:42  Phos  3.4     09-28  Mg     2.0     09-28    TPro  7.5  /  Alb  3.2<L>  /  TBili  0.3  /  DBili  x   /  AST  28  /  ALT  49  /  AlkPhos  79  09-28

## 2021-09-29 NOTE — PROGRESS NOTE BEHAVIORAL HEALTH - NSBHFUPINTERVALHXFT_PSY_A_CORE
PT reports feeling7 better today, he slept last night. Appetite  is good. Feeling  depressed. Denies SI, HI, aH, VH, PI. Linear, cooperative and fully oriented.
Patient is alert and oriented to person, time, place and situation. Patient is calm and cooperative, pleasant; organized. Patient is largely tangential however able to self redirect during conversation. Patient has no marked presence of thought disorder. Reports insomnia in last 3 days. Patient is not pressured, with no flight of ideas, no grandiosity. Patient is not irritable, with no mood lability or mood dysregulation. Patient has no agitation or aggression. Patient is in good behavioral and impulse control. Denies depressed mood or anhedonia, hopelessness or suicidal ideation. Denies psychotic symptoms. Patient complaining of anxiety, stating he will be living alone after discharge from hospital secondary to breakup from girlfriend. Patient however not appearing to be in panic. Denies drug abuse however was abusing alcohol sometime prior to admission. Denies other acute stressors at home/ Patient is unemployed but sells things online. Denies other complaints / needs at this time.

## 2021-09-29 NOTE — PROGRESS NOTE BEHAVIORAL HEALTH - RISK ASSESSMENT
Low acute risk: Pt is using EtOH and is depressed, but does not want to die and has no SI.   Increased long term risk: hx of SA 20 years ago, depression nad EtOH abuse
Low acute risk: Pt is using EtOH and is depressed, but does not want to die and has no SI.   Increased long term risk: hx of SA 20 years ago, depression nad EtOH abuse

## 2021-09-29 NOTE — PROGRESS NOTE ADULT - ASSESSMENT
55/M admitted with     Problem/Plan - 1:  ·  Problem: Alcohol abuse.   ·  Plan: completed CIWA protocol with ativan  Pt offered counseling and advised follow up with alcohol dependence program such as AA  case management to be consulted to offer programs inpatient vs outpatient for polysubstance abuse.    Problem/Plan - 2:  ·  Problem: Substance induced mood disorder.   ·  Plan: continue Wellbutrin  unclear if pain seeking or true cardiac chest pain as pt may not be  a reliable historian    Problem/Plan - 3:  ·  Problem: NSTEMI (non-ST elevation myocardial infarction).   ·  Plan: seen by cardiology and underwent LHC, +arteriosclerotic small D2 branch no intervention due to risk of pt non-compliance  plavix loaded continue daily dose plavix  continue ASA, statin  no betablocker in setting of cocaine positive tox  9/26 add nitrates as pt has ongoing chest pain, unclear if there is pain seeking behavior as pt with admitted history of opiate dependence.    Problem/Plan - 4:  ·  Problem: NSVT (nonsustained ventricular tachycardia).   ·  Plan: 2/2 to NSTEMI, no recent recurrence.    Problem/Plan - 5:  ·  Problem: Acute pancreatitis.   ·  Plan: secondary to alcohol abuse  prn dilaudid  c/o abd pain  check CMP , lipase;   would do CT abdo/ pelvis with po and iv contrast if renal function is normal  improving pancreatitis but annd pain present  GI consult requested; f/u    Problem/Plan - 6:  ·  Problem: Cocaine abuse.   ·  Plan: pt adamant that he doesn't regularly use cocaine but not clear if he is reliable in history  pt could benefit from rehab 2/2 multi substance abuse  case management referral as above.    DVT PPX: lovenox    dispo: f/u GI consult; d/c planning thereafter 55/M admitted with     Problem/Plan - 1:  ·  Problem: Alcohol abuse.   ·  Plan: completed CIWA protocol with ativan  Pt offered counseling and advised follow up with alcohol dependence program such as AA  case management to be consulted to offer programs inpatient vs outpatient for polysubstance abuse.    Problem/Plan - 2:  ·  Problem: Substance induced mood disorder.   ·  Plan: continue Wellbutrin  unclear if pain seeking or true cardiac chest pain as pt may not be  a reliable historian    Problem/Plan - 3:  ·  Problem: NSTEMI (non-ST elevation myocardial infarction).   ·  Plan: seen by cardiology and underwent LHC, +arteriosclerotic small D2 branch no intervention due to risk of pt non-compliance  plavix loaded continue daily dose plavix  continue ASA, statin  no betablocker in setting of cocaine positive tox  9/26 add nitrates as pt has ongoing chest pain, unclear if there is pain seeking behavior as pt with admitted history of opiate dependence.    Problem/Plan - 4:  ·  Problem: NSVT (nonsustained ventricular tachycardia).   ·  Plan: 2/2 to NSTEMI, no recent recurrence.    Problem/Plan - 5:  ·  Problem: Acute pancreatitis.   ·  Plan: secondary to alcohol abuse  prn dilaudid  c/o abd pain  check CMP , lipase;   would do CT abdo/ pelvis with po and iv contrast if renal function is normal  improving pancreatitis but annd pain present  GI consult requested; f/u    Problem/Plan - 6:  ·  Problem: Cocaine abuse.   ·  Plan: pt adamant that he doesn't regularly use cocaine but not clear if he is reliable in history  pt could benefit from rehab 2/2 multi substance abuse  case management referral as above.    DVT PPX: lovenox    Disposition: Gettin large amounts of IV opioids and benzos. Discontinued 9/29. Given the nature of 11 days of high dose administration and frequent IV opioids, likely to go into withdrawl if were to discontinued. DC all IV medications and start PO and down titrate. Unable to provide prescriptions at discharge in the setting of patient's known history and relapse of substance abuse and etOH abuse.  Continue to monitor. Heart and abdominal status stable.

## 2021-09-30 LAB
ALBUMIN SERPL ELPH-MCNC: 3.3 G/DL — SIGNIFICANT CHANGE UP (ref 3.3–5)
ALP SERPL-CCNC: 73 U/L — SIGNIFICANT CHANGE UP (ref 40–120)
ALT FLD-CCNC: 45 U/L — SIGNIFICANT CHANGE UP (ref 12–78)
ANION GAP SERPL CALC-SCNC: 6 MMOL/L — SIGNIFICANT CHANGE UP (ref 5–17)
AST SERPL-CCNC: 24 U/L — SIGNIFICANT CHANGE UP (ref 15–37)
BASOPHILS # BLD AUTO: 0.05 K/UL — SIGNIFICANT CHANGE UP (ref 0–0.2)
BASOPHILS NFR BLD AUTO: 0.6 % — SIGNIFICANT CHANGE UP (ref 0–2)
BILIRUB SERPL-MCNC: 0.4 MG/DL — SIGNIFICANT CHANGE UP (ref 0.2–1.2)
BUN SERPL-MCNC: 5 MG/DL — LOW (ref 7–23)
CALCIUM SERPL-MCNC: 9.6 MG/DL — SIGNIFICANT CHANGE UP (ref 8.5–10.1)
CHLORIDE SERPL-SCNC: 101 MMOL/L — SIGNIFICANT CHANGE UP (ref 96–108)
CO2 SERPL-SCNC: 26 MMOL/L — SIGNIFICANT CHANGE UP (ref 22–31)
CREAT SERPL-MCNC: 0.72 MG/DL — SIGNIFICANT CHANGE UP (ref 0.5–1.3)
EOSINOPHIL # BLD AUTO: 0.03 K/UL — SIGNIFICANT CHANGE UP (ref 0–0.5)
EOSINOPHIL NFR BLD AUTO: 0.4 % — SIGNIFICANT CHANGE UP (ref 0–6)
GLUCOSE SERPL-MCNC: 94 MG/DL — SIGNIFICANT CHANGE UP (ref 70–99)
HCT VFR BLD CALC: 35.7 % — LOW (ref 39–50)
HGB BLD-MCNC: 11.6 G/DL — LOW (ref 13–17)
IMM GRANULOCYTES NFR BLD AUTO: 0.5 % — SIGNIFICANT CHANGE UP (ref 0–1.5)
LYMPHOCYTES # BLD AUTO: 1.45 K/UL — SIGNIFICANT CHANGE UP (ref 1–3.3)
LYMPHOCYTES # BLD AUTO: 17.8 % — SIGNIFICANT CHANGE UP (ref 13–44)
MAGNESIUM SERPL-MCNC: 2.1 MG/DL — SIGNIFICANT CHANGE UP (ref 1.6–2.6)
MCHC RBC-ENTMCNC: 27 PG — SIGNIFICANT CHANGE UP (ref 27–34)
MCHC RBC-ENTMCNC: 32.5 GM/DL — SIGNIFICANT CHANGE UP (ref 32–36)
MCV RBC AUTO: 83 FL — SIGNIFICANT CHANGE UP (ref 80–100)
MONOCYTES # BLD AUTO: 0.75 K/UL — SIGNIFICANT CHANGE UP (ref 0–0.9)
MONOCYTES NFR BLD AUTO: 9.2 % — SIGNIFICANT CHANGE UP (ref 2–14)
NEUTROPHILS # BLD AUTO: 5.83 K/UL — SIGNIFICANT CHANGE UP (ref 1.8–7.4)
NEUTROPHILS NFR BLD AUTO: 71.5 % — SIGNIFICANT CHANGE UP (ref 43–77)
PLATELET # BLD AUTO: 516 K/UL — HIGH (ref 150–400)
POTASSIUM SERPL-MCNC: 3.5 MMOL/L — SIGNIFICANT CHANGE UP (ref 3.5–5.3)
POTASSIUM SERPL-SCNC: 3.5 MMOL/L — SIGNIFICANT CHANGE UP (ref 3.5–5.3)
PROT SERPL-MCNC: 7.3 GM/DL — SIGNIFICANT CHANGE UP (ref 6–8.3)
RBC # BLD: 4.3 M/UL — SIGNIFICANT CHANGE UP (ref 4.2–5.8)
RBC # FLD: 15.4 % — HIGH (ref 10.3–14.5)
SODIUM SERPL-SCNC: 133 MMOL/L — LOW (ref 135–145)
WBC # BLD: 8.15 K/UL — SIGNIFICANT CHANGE UP (ref 3.8–10.5)
WBC # FLD AUTO: 8.15 K/UL — SIGNIFICANT CHANGE UP (ref 3.8–10.5)

## 2021-09-30 PROCEDURE — 99232 SBSQ HOSP IP/OBS MODERATE 35: CPT

## 2021-09-30 RX ORDER — OXYCODONE HYDROCHLORIDE 5 MG/1
5 TABLET ORAL EVERY 6 HOURS
Refills: 0 | Status: DISCONTINUED | OUTPATIENT
Start: 2021-09-30 | End: 2021-10-01

## 2021-09-30 RX ORDER — ALPRAZOLAM 0.25 MG
0.5 TABLET ORAL EVERY 12 HOURS
Refills: 0 | Status: DISCONTINUED | OUTPATIENT
Start: 2021-09-30 | End: 2021-10-01

## 2021-09-30 RX ADMIN — BUPROPION HYDROCHLORIDE 150 MILLIGRAM(S): 150 TABLET, EXTENDED RELEASE ORAL at 08:01

## 2021-09-30 RX ADMIN — TRAMADOL HYDROCHLORIDE 50 MILLIGRAM(S): 50 TABLET ORAL at 08:00

## 2021-09-30 RX ADMIN — Medication 0.5 MILLIGRAM(S): at 13:40

## 2021-09-30 RX ADMIN — TRAMADOL HYDROCHLORIDE 50 MILLIGRAM(S): 50 TABLET ORAL at 01:29

## 2021-09-30 RX ADMIN — TRAMADOL HYDROCHLORIDE 50 MILLIGRAM(S): 50 TABLET ORAL at 02:29

## 2021-09-30 RX ADMIN — OXYCODONE HYDROCHLORIDE 5 MILLIGRAM(S): 5 TABLET ORAL at 04:31

## 2021-09-30 RX ADMIN — ATORVASTATIN CALCIUM 80 MILLIGRAM(S): 80 TABLET, FILM COATED ORAL at 21:12

## 2021-09-30 RX ADMIN — Medication 0.5 MILLIGRAM(S): at 23:40

## 2021-09-30 RX ADMIN — Medication 1 TABLET(S): at 08:02

## 2021-09-30 RX ADMIN — Medication 81 MILLIGRAM(S): at 08:00

## 2021-09-30 RX ADMIN — OXYCODONE HYDROCHLORIDE 5 MILLIGRAM(S): 5 TABLET ORAL at 17:46

## 2021-09-30 RX ADMIN — TRAMADOL HYDROCHLORIDE 50 MILLIGRAM(S): 50 TABLET ORAL at 21:12

## 2021-09-30 RX ADMIN — Medication 0.5 MILLIGRAM(S): at 00:27

## 2021-09-30 RX ADMIN — OXYCODONE HYDROCHLORIDE 5 MILLIGRAM(S): 5 TABLET ORAL at 03:31

## 2021-09-30 RX ADMIN — ISOSORBIDE MONONITRATE 30 MILLIGRAM(S): 60 TABLET, EXTENDED RELEASE ORAL at 08:01

## 2021-09-30 RX ADMIN — ENOXAPARIN SODIUM 40 MILLIGRAM(S): 100 INJECTION SUBCUTANEOUS at 08:01

## 2021-09-30 RX ADMIN — CLOPIDOGREL BISULFATE 75 MILLIGRAM(S): 75 TABLET, FILM COATED ORAL at 08:00

## 2021-09-30 RX ADMIN — OXYCODONE HYDROCHLORIDE 5 MILLIGRAM(S): 5 TABLET ORAL at 11:30

## 2021-09-30 RX ADMIN — Medication 1 MILLIGRAM(S): at 08:00

## 2021-09-30 RX ADMIN — TRAMADOL HYDROCHLORIDE 50 MILLIGRAM(S): 50 TABLET ORAL at 15:01

## 2021-09-30 RX ADMIN — Medication 100 MILLIGRAM(S): at 07:59

## 2021-09-30 RX ADMIN — TRAMADOL HYDROCHLORIDE 50 MILLIGRAM(S): 50 TABLET ORAL at 22:12

## 2021-09-30 NOTE — PROGRESS NOTE ADULT - SUBJECTIVE AND OBJECTIVE BOX
Patient is a 55y old  Male who presents with a chief complaint of Abdominal pain (25 Sep 2021 13:07)    Pt continues to c/o chest pain and requesting morphine.  Denies N/V, still also w/ epigastric pain.    Pt adamant that he doesn't "abuse cocaine" and that he was with a girl smoking marijuana that he only "occasionally" does as well, and didn't know that the marijuana had cocaine in it.    Denies SOB    9/27: c/o abd pain    9/28: abd pain present  CT abdo done today; improving pancreatitis  GI eval  c/o anxiety; xanax started    9/29: Pain improved but requesting pain medicatino ordered around the clock. very anxious.   9/30: patient reported verbally abusive to staff over several days. Discussed with patient and his sister and stated that this will not be tolerated and is unacceptable. Patient verbalized understanding. Very sleepy today. Otherwise reports no complaints.       PHYSICAL EXAM:    T(C): 36.5 (09-30-21 @ 21:07), Max: 36.5 (09-30-21 @ 21:07)  HR: 65 (09-30-21 @ 21:07) (59 - 70)  BP: 126/68 (09-30-21 @ 21:07) (113/78 - 140/69)  RR: 16 (09-30-21 @ 21:07) (16 - 18)  SpO2: 100% (09-30-21 @ 21:07) (99% - 100%)    Constitutional: Well appearing  HEENT: Atraumatic, [place], Normal, No congestion  Respiratory: Breath Sounds normal, no rhonchi/wheeze  Cardiovascular: N S1S2; BERT present  Gastrointestinal: Abdomen soft, non tender, Bowel Sounds present  Extremities: No edema, peripheral pulses present  Neurological: AAO x 3, no gross focal motor deficits  Skin: Non cellulitic, no rash, ulcers  Lymph Nodes: No lymphadenopathy noted  Back: No CVA tenderness   Musculoskeletal: non tender  Breasts: Deferred  Genitourinary: deferred  Rectal: Deferred                          11.6   8.15  )-----------( 516      ( 30 Sep 2021 12:08 )             35.7     09-30    133<L>  |  101  |  5<L>  ----------------------------<  94  3.5   |  26  |  0.72    Ca    9.6      30 Sep 2021 12:08  Mg     2.1     09-30    TPro  7.3  /  Alb  3.3  /  TBili  0.4  /  DBili  x   /  AST  24  /  ALT  45  /  AlkPhos  73  09-30    COVID-19 PCR: NotDetec (25 Sep 2021 19:59)  COVID-19 PCR: NotDetec (18 Sep 2021 18:27)    CAPILLARY BLOOD GLUCOSE          09-28    133<L>  |  99  |  6<L>  ----------------------------<  103<H>  3.6   |  28  |  0.78    Ca    9.2      28 Sep 2021 09:42  Phos  3.4     09-28  Mg     2.0     09-28    TPro  7.5  /  Alb  3.2<L>  /  TBili  0.3  /  DBili  x   /  AST  28  /  ALT  49  /  AlkPhos  79  09-28    COVID-19 PCR: NotDete (25 Sep 2021 19:59)  COVID-19 PCR: NotDetec (18 Sep 2021 18:27)    CAPILLARY BLOOD GLUCOSE                   09-28    133<L>  |  99  |  6<L>  ----------------------------<  103<H>  3.6   |  28  |  0.78    Ca    9.2      28 Sep 2021 09:42  Phos  3.4     09-28  Mg     2.0     09-28    TPro  7.5  /  Alb  3.2<L>  /  TBili  0.3  /  DBili  x   /  AST  28  /  ALT  49  /  AlkPhos  79  09-28    CBC Full  -  ( 27 Sep 2021 08:46 )  WBC Count : 9.56 K/uL  RBC Count : 4.10 M/uL  Hemoglobin : 11.1 g/dL  Hematocrit : 33.4 %  Platelet Count - Automated : 399 K/uL  Mean Cell Volume : 81.5 fl  Mean Cell Hemoglobin : 27.1 pg  Mean Cell Hemoglobin Concentration : 33.2 gm/dL  Auto Neutrophil # : x  Auto Lymphocyte # : x  Auto Monocyte # : x  Auto Eosinophil # : x  Auto Basophil # : x  Auto Neutrophil % : x  Auto Lymphocyte % : x  Auto Monocyte % : x  Auto Eosinophil % : x  Auto Basophil % : x               11.1   9.56  )-----------( 399      ( 27 Sep 2021 08:46 )             33.4       CBC Full  -  ( 27 Sep 2021 08:46 )  WBC Count : 9.56 K/uL  RBC Count : 4.10 M/uL  Hemoglobin : 11.1 g/dL  Hematocrit : 33.4 %  Platelet Count - Automated : 399 K/uL  Mean Cell Volume : 81.5 fl  Mean Cell Hemoglobin : 27.1 pg  Mean Cell Hemoglobin Concentration : 33.2 gm/dL  Auto Neutrophil # : x  Auto Lymphocyte # : x  Auto Monocyte # : x  Auto Eosinophil # : x  Auto Basophil # : x  Auto Neutrophil % : x  Auto Lymphocyte % : x  Auto Monocyte % : x  Auto Eosinophil % : x  Auto Basophil % : x        MEDICATIONS  (STANDING):  aspirin enteric coated 81 milliGRAM(s) Oral daily  atorvastatin 80 milliGRAM(s) Oral at bedtime  buPROPion XL (24-Hour) Oral Tab/Cap - Peds 150 milliGRAM(s) Oral daily  clopidogrel Tablet 75 milliGRAM(s) Oral daily  enoxaparin Injectable 40 milliGRAM(s) SubCutaneous daily  folic acid 1 milliGRAM(s) Oral daily  influenza   Vaccine 0.5 milliLiter(s) IntraMuscular once  isosorbide   mononitrate ER Tablet (IMDUR) 30 milliGRAM(s) Oral daily  multivitamin 1 Tablet(s) Oral daily  nicotine -  14 mG/24Hr(s) Patch 1 patch Transdermal daily    < from: CT Abdomen and Pelvis w/ IV Cont (09.28.21 @ 12:45) >  IMPRESSION:  *  Persistent but improving inflammatory changes in association with interstitial edematous pancreatitis. No duct dilatation, collection, or mass.    < end of copied text >

## 2021-09-30 NOTE — PROGRESS NOTE ADULT - NUTRITIONAL ASSESSMENT
This patient has been assessed with a concern for Malnutrition and has been determined to have a diagnosis/diagnoses of Moderate protein-calorie malnutrition.    This patient is being managed with:   Diet DASH/TLC-  Sodium & Cholesterol Restricted     Special Instructions for Nursing:  Sodium & Cholesterol Restricted  Entered: Sep 24 2021 10:54AM    

## 2021-09-30 NOTE — PROGRESS NOTE ADULT - ASSESSMENT
55/M admitted with     Problem/Plan - 1:  ·  Problem: Alcohol abuse.   ·  Plan: completed CIWA protocol with ativan  Pt offered counseling and advised follow up with alcohol dependence program such as AA  case management to be consulted to offer programs inpatient vs outpatient for polysubstance abuse.    Problem/Plan - 2:  ·  Problem: Substance induced mood disorder.   ·  Plan: continue Wellbutrin  unclear if pain seeking or true cardiac chest pain as pt may not be  a reliable historian    Problem/Plan - 3:  ·  Problem: NSTEMI (non-ST elevation myocardial infarction).   ·  Plan: seen by cardiology and underwent LHC, +arteriosclerotic small D2 branch no intervention due to risk of pt non-compliance  plavix loaded continue daily dose plavix  continue ASA, statin  no betablocker in setting of cocaine positive tox  9/26 add nitrates as pt has ongoing chest pain, unclear if there is pain seeking behavior as pt with admitted history of opiate dependence.    Problem/Plan - 4:  ·  Problem: NSVT (nonsustained ventricular tachycardia).   ·  Plan: 2/2 to NSTEMI, no recent recurrence.    Problem/Plan - 5:  ·  Problem: Acute pancreatitis.   ·  Plan: secondary to alcohol abuse  prn dilaudid  c/o abd pain  check CMP , lipase;   would do CT abdo/ pelvis with po and iv contrast if renal function is normal  improving pancreatitis but annd pain present  GI consult requested; f/u    Problem/Plan - 6:  ·  Problem: Cocaine abuse.   ·  Plan: pt adamant that he doesn't regularly use cocaine but not clear if he is reliable in history  pt could benefit from rehab 2/2 multi substance abuse  case management referral as above.    DVT PPX: lovenox    Disposition: Gettin large amounts of IV opioids and benzos. Discontinued 9/29. Given the nature of 11 days of high dose administration and frequent IV opioids, likely to go into withdrawl if were to discontinued. DC all IV medications and start PO and down titrate. Unable to provide prescriptions at discharge in the setting of patient's known history and relapse of substance abuse and etOH abuse.  Continue to monitor. Heart and abdominal status stable.     9/30: Down titrating control substances more. Patient appears to be tolerating. Had extensive discussion with sister. States that she is will to supervise administration of benzo at home and I advised her that it is still a high risk situation and options are to follow up closely with outpatient rehab/psych or he volunatarily admits himself to inpatient rehab. Asked SW to reach out to sister to provider list of options and resources. Possible discharge in 1-2 days if patient is able to tolerating taper and discontinuation of control substances without any significant issues.

## 2021-10-01 ENCOUNTER — TRANSCRIPTION ENCOUNTER (OUTPATIENT)
Age: 56
End: 2021-10-01

## 2021-10-01 VITALS
RESPIRATION RATE: 18 BRPM | OXYGEN SATURATION: 98 % | SYSTOLIC BLOOD PRESSURE: 109 MMHG | HEART RATE: 72 BPM | DIASTOLIC BLOOD PRESSURE: 63 MMHG | TEMPERATURE: 97 F

## 2021-10-01 LAB — SARS-COV-2 RNA SPEC QL NAA+PROBE: SIGNIFICANT CHANGE UP

## 2021-10-01 PROCEDURE — 99232 SBSQ HOSP IP/OBS MODERATE 35: CPT

## 2021-10-01 RX ORDER — ALPRAZOLAM 0.25 MG
0.25 TABLET ORAL
Qty: 2.25 | Refills: 0
Start: 2021-10-01 | End: 2021-10-03

## 2021-10-01 RX ORDER — ATORVASTATIN CALCIUM 80 MG/1
1 TABLET, FILM COATED ORAL
Qty: 14 | Refills: 0
Start: 2021-10-01 | End: 2021-10-14

## 2021-10-01 RX ORDER — CLOPIDOGREL BISULFATE 75 MG/1
1 TABLET, FILM COATED ORAL
Qty: 14 | Refills: 0
Start: 2021-10-01 | End: 2021-10-14

## 2021-10-01 RX ORDER — THIAMINE MONONITRATE (VIT B1) 100 MG
1 TABLET ORAL
Qty: 14 | Refills: 0
Start: 2021-10-01 | End: 2021-10-14

## 2021-10-01 RX ORDER — FOLIC ACID 0.8 MG
1 TABLET ORAL
Qty: 14 | Refills: 0
Start: 2021-10-01 | End: 2021-10-14

## 2021-10-01 RX ORDER — NICOTINE POLACRILEX 2 MG
1 GUM BUCCAL
Qty: 14 | Refills: 0
Start: 2021-10-01 | End: 2021-10-14

## 2021-10-01 RX ORDER — ISOSORBIDE MONONITRATE 60 MG/1
1 TABLET, EXTENDED RELEASE ORAL
Qty: 14 | Refills: 0
Start: 2021-10-01 | End: 2021-10-14

## 2021-10-01 RX ORDER — ACETAMINOPHEN 500 MG
2 TABLET ORAL
Qty: 0 | Refills: 0 | DISCHARGE
Start: 2021-10-01

## 2021-10-01 RX ORDER — ASPIRIN/CALCIUM CARB/MAGNESIUM 324 MG
1 TABLET ORAL
Qty: 0 | Refills: 0 | DISCHARGE
Start: 2021-10-01

## 2021-10-01 RX ORDER — BUPROPION HYDROCHLORIDE 150 MG/1
1 TABLET, EXTENDED RELEASE ORAL
Qty: 14 | Refills: 0
Start: 2021-10-01 | End: 2021-10-14

## 2021-10-01 RX ADMIN — OXYCODONE HYDROCHLORIDE 5 MILLIGRAM(S): 5 TABLET ORAL at 02:58

## 2021-10-01 RX ADMIN — OXYCODONE HYDROCHLORIDE 5 MILLIGRAM(S): 5 TABLET ORAL at 17:58

## 2021-10-01 RX ADMIN — BUPROPION HYDROCHLORIDE 150 MILLIGRAM(S): 150 TABLET, EXTENDED RELEASE ORAL at 09:05

## 2021-10-01 RX ADMIN — Medication 100 MILLIGRAM(S): at 09:05

## 2021-10-01 RX ADMIN — Medication 0.5 MILLIGRAM(S): at 13:33

## 2021-10-01 RX ADMIN — CLOPIDOGREL BISULFATE 75 MILLIGRAM(S): 75 TABLET, FILM COATED ORAL at 09:05

## 2021-10-01 RX ADMIN — OXYCODONE HYDROCHLORIDE 5 MILLIGRAM(S): 5 TABLET ORAL at 09:05

## 2021-10-01 RX ADMIN — ISOSORBIDE MONONITRATE 30 MILLIGRAM(S): 60 TABLET, EXTENDED RELEASE ORAL at 09:05

## 2021-10-01 RX ADMIN — TRAMADOL HYDROCHLORIDE 50 MILLIGRAM(S): 50 TABLET ORAL at 05:26

## 2021-10-01 RX ADMIN — OXYCODONE HYDROCHLORIDE 5 MILLIGRAM(S): 5 TABLET ORAL at 09:45

## 2021-10-01 RX ADMIN — Medication 1 MILLIGRAM(S): at 09:05

## 2021-10-01 RX ADMIN — Medication 81 MILLIGRAM(S): at 09:05

## 2021-10-01 RX ADMIN — ENOXAPARIN SODIUM 40 MILLIGRAM(S): 100 INJECTION SUBCUTANEOUS at 09:06

## 2021-10-01 RX ADMIN — OXYCODONE HYDROCHLORIDE 5 MILLIGRAM(S): 5 TABLET ORAL at 03:58

## 2021-10-01 RX ADMIN — Medication 1 TABLET(S): at 09:05

## 2021-10-01 NOTE — PROGRESS NOTE ADULT - PROVIDER SPECIALTY LIST ADULT
Cardiology
Cardiology
Hospitalist
Hospitalist
Pulmonology
Gastroenterology
Hospitalist
Cardiology
Cardiology
Hospitalist
Hospitalist
Pulmonology
Cardiology
Hospitalist
Cardiology
Hospitalist
Hospitalist

## 2021-10-01 NOTE — DISCHARGE NOTE NURSING/CASE MANAGEMENT/SOCIAL WORK - NSDCPEEMAIL_GEN_ALL_CORE
[facility] email [email or web address]

## 2021-10-01 NOTE — DISCHARGE NOTE PROVIDER - DETAILS OF MALNUTRITION DIAGNOSIS/DIAGNOSES
This patient has been assessed with a concern for Malnutrition and was treated during this hospitalization for the following Nutrition diagnosis/diagnoses:     -  09/23/2021: Moderate protein-calorie malnutrition

## 2021-10-01 NOTE — DISCHARGE NOTE PROVIDER - HOSPITAL COURSE
disposition:  - patient was offered inpatient rehab to aggressively address his polysubstance abuse disorder.   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  - encouraged patient to follow up with outpatient rehab programs as described by CM.  - otherwise, patient will leave the hospital today and proceed back to living on the premiss of his sister's home.  - I further notified the patient that he will be leaving without opioid analgesics due to his high risk of relapsing.  - alternative pain management strategies reviewed.  - I did agree to Rx Xanax 0.25mg po tid x 3 days supply (iSTOP Ref #:  [id number]) as this seems to be helping him manage his psychosocial issues on the short term.  patient had been made aware of Xanax dependency potential.  - advised close follow up with his family physician in the community to further orchestrate his complex medical care.    communication.  - 5E RN manager.  - 5E RN.  - 5E CM.    Problem/Plan - 1:  ·  Problem: Alcohol abuse.   ·  Plan: completed CIWA protocol with ativan  Pt offered counseling and advised follow up with alcohol dependence program such as AA  case management to be consulted to offer programs inpatient vs outpatient for polysubstance abuse.    Problem/Plan - 2:  ·  Problem: Substance induced mood disorder.   ·  Plan: continue Wellbutrin  unclear if pain seeking or true cardiac chest pain as pt may not be  a reliable historian    Problem/Plan - 3:  ·  Problem: NSTEMI (non-ST elevation myocardial infarction).   ·  Plan: seen by cardiology and underwent LHC, +arteriosclerotic small D2 branch no intervention due to risk of pt non-compliance  plavix loaded continue daily dose plavix  continue ASA, statin  no betablocker in setting of cocaine positive tox  9/26 add nitrates as pt has ongoing chest pain, unclear if there is pain seeking behavior as pt with admitted history of opiate dependence.    Problem/Plan - 4:  ·  Problem: NSVT (nonsustained ventricular tachycardia).   ·  Plan: 2/2 to NSTEMI, no recent recurrence.    Problem/Plan - 5:  ·  Problem: Acute pancreatitis.   ·  Plan: secondary to alcohol abuse  prn dilaudid  c/o abd pain  check CMP , lipase;   would do CT abdo/ pelvis with po and iv contrast if renal function is normal  improving pancreatitis but annd pain present  GI consult requested; f/u    Problem/Plan - 6:  ·  Problem: Cocaine abuse.   ·  Plan: pt adamant that he doesn't regularly use cocaine but not clear if he is reliable in history  pt could benefit from rehab 2/2 multi substance abuse  case management referral as above.    DVT PPX: lovenox    Disposition: Gettin large amounts of IV opioids and benzos. Discontinued 9/29. Given the nature of 11 days of high dose administration and frequent IV opioids, likely to go into withdrawl if were to discontinued. DC all IV medications and start PO and down titrate. Unable to provide prescriptions at discharge in the setting of patient's known history and relapse of substance abuse and etOH abuse.  Continue to monitor. Heart and abdominal status stable.     9/30: Down titrating control substances more. Patient appears to be tolerating. Had extensive discussion with sister. States that she is will to supervise administration of benzo at home and I advised her that it is still a high risk situation and options are to follow up closely with outpatient rehab/psych or he volunatarily admits himself to inpatient rehab. Asked SW to reach out to sister to provider list of options and resources. Possible discharge in 1-2 days if patient is able to tolerating taper and discontinuation of control substances without any significant issues. disposition:  - patient was offered inpatient rehab to aggressively address his polysubstance abuse disorder.   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  - encouraged patient to follow up with outpatient rehab programs as described by CM.  - otherwise, patient will leave the hospital today and proceed back to living on the premiss of his sister's home.  - I further notified the patient that he will be leaving without opioid analgesics due to his high risk of relapsing.  - alternative pain management strategies reviewed.  - I did agree to Rx Xanax 0.25mg po tid x 3 days supply (iSTOP Ref #:  [id number]) as this seems to be helping him manage his psychosocial issues on the short term.  patient had been made aware of Xanax dependency potential.  - advised close follow up with his family physician in the community to further orchestrate his complex medical care.    communication.  - 5E RN manager.  - 5E RN.  - 5E CM.    Problem/Plan - 1:  ·  Problem: Alcohol abuse.   ·  Plan: completed CIWA protocol with ativan  Pt offered counseling and advised follow up with alcohol dependence program such as AA  case management to be consulted to offer programs inpatient vs outpatient for polysubstance abuse.    Problem/Plan - 2:  ·  Problem: Substance induced mood disorder.   ·  Plan: continue Wellbutrin  unclear if pain seeking or true cardiac chest pain as pt may not be  a reliable historian    Problem/Plan - 3:  ·  Problem: NSTEMI (non-ST elevation myocardial infarction).   ·  Plan: seen by cardiology and underwent LHC, +arteriosclerotic small D2 branch no intervention due to risk of pt non-compliance  plavix loaded continue daily dose plavix  continue ASA, statin  no betablocker in setting of cocaine positive tox  9/26 add nitrates as pt has ongoing chest pain, unclear if there is pain seeking behavior as pt with admitted history of opiate dependence.    Problem/Plan - 4:  ·  Problem: NSVT (nonsustained ventricular tachycardia).   ·  Plan: 2/2 to NSTEMI, no recent recurrence.    Problem/Plan - 5:  ·  Problem: Acute pancreatitis.   ·  Plan: secondary to alcohol abuse  prn dilaudid  c/o abd pain  check CMP , lipase;   would do CT abdo/ pelvis with po and iv contrast if renal function is normal  improving pancreatitis but annd pain present  GI consult requested; f/u    Problem/Plan - 6:  ·  Problem: Cocaine abuse.   ·  Plan: pt adamant that he doesn't regularly use cocaine but not clear if he is reliable in history  pt could benefit from rehab 2/2 multi substance abuse  case management referral as above.    DVT PPX: lovenox    Disposition: Gettin large amounts of IV opioids and benzos. Discontinued 9/29. Given the nature of 11 days of high dose administration and frequent IV opioids, likely to go into withdrawl if were to discontinued. DC all IV medications and start PO and down titrate. Unable to provide prescriptions at discharge in the setting of patient's known history and relapse of substance abuse and etOH abuse.  Continue to monitor. Heart and abdominal status stable.     9/30: Down titrating control substances more. Patient appears to be tolerating. Had extensive discussion with sister. States that she is will to supervise administration of benzo at home and I advised her that it is still a high risk situation and options are to follow up closely with outpatient rehab/psych or he volunatarily admits himself to inpatient rehab. Asked SW to reach out to sister to provider list of options and resources. Possible discharge in 1-2 days if patient is able to tolerating taper and discontinuation of control substances without any significant issues.

## 2021-10-01 NOTE — DISCHARGE NOTE NURSING/CASE MANAGEMENT/SOCIAL WORK - PATIENT PORTAL LINK FT
You can access the FollowMyHealth Patient Portal offered by [facility] by registering at the following website: [email or web address]. By joining [employer]’s FollowMyHealth portal, you will also be able to view your health information using other applications (apps) compatible with our system.

## 2021-10-01 NOTE — DISCHARGE NOTE PROVIDER - NSDCMRMEDTOKEN_GEN_ALL_CORE_FT
acetaminophen 325 mg oral tablet: 2 tab(s) orally every 6 hours, As needed, Mild Pain (1 - 3)  ALPRAZolam 0.5 mg oral tablet: 0.25 tab(s) orally 3 times a day, As Needed -Anxiety/Agitation MDD:0.75  aspirin 81 mg oral delayed release tablet: 1 tab(s) orally once a day  atorvastatin 80 mg oral tablet: 1 tab(s) orally once a day (at bedtime)  buPROPion 150 mg/24 hours (XL) oral tablet, extended release: 1 tab(s) orally once a day  clopidogrel 75 mg oral tablet: 1 tab(s) orally once a day  folic acid 1 mg oral tablet: 1 tab(s) orally once a day  isosorbide mononitrate 30 mg oral tablet, extended release: 1 tab(s) orally once a day  Multiple Vitamins oral tablet: 1 tab(s) orally once a day  nicotine 14 mg/24 hr transdermal film, extended release: 1 patch transdermal once a day   thiamine 100 mg oral tablet: 1 tab(s) orally once a day

## 2021-10-01 NOTE — PROGRESS NOTE ADULT - SUBJECTIVE AND OBJECTIVE BOX
CC:  Patient is a 55y old  Male who presents with a chief complaint of Abdominal pain (30 Sep 2021 17:00)    SUBJECTIVE:     -no new complaints or issues at current time.    ROS:  all other review of systems are negative unless indicated above.    acetaminophen   Tablet .. 650 milliGRAM(s) Oral every 6 hours PRN  ALPRAZolam 0.5 milliGRAM(s) Oral every 12 hours PRN  aspirin enteric coated 81 milliGRAM(s) Oral daily  atorvastatin 80 milliGRAM(s) Oral at bedtime  buPROPion XL (24-Hour) Oral Tab/Cap - Peds 150 milliGRAM(s) Oral daily  clopidogrel Tablet 75 milliGRAM(s) Oral daily  enoxaparin Injectable 40 milliGRAM(s) SubCutaneous daily  folic acid 1 milliGRAM(s) Oral daily  influenza   Vaccine 0.5 milliLiter(s) IntraMuscular once  isosorbide   mononitrate ER Tablet (IMDUR) 30 milliGRAM(s) Oral daily  multivitamin 1 Tablet(s) Oral daily  nicotine -  14 mG/24Hr(s) Patch 1 patch Transdermal daily  ondansetron Injectable 4 milliGRAM(s) IV Push every 6 hours PRN  oxyCODONE    IR 5 milliGRAM(s) Oral every 6 hours PRN  thiamine 100 milliGRAM(s) Oral daily  traMADol 50 milliGRAM(s) Oral every 6 hours PRN    T(C): 36.2 (10-01-21 @ 08:15), Max: 36.5 (09-30-21 @ 21:07)  HR: 55 (10-01-21 @ 08:15) (55 - 68)  BP: 147/79 (10-01-21 @ 08:15) (113/78 - 147/79)  RR: 18 (10-01-21 @ 08:15) (16 - 18)  SpO2: 100% (10-01-21 @ 08:15) (99% - 100%)    Constitutional: NAD.   HEENT: PERRL, EOMI, MMM.  Neck: Soft and supple, No carotid bruit, No JVD  Respiratory: Breath sounds are clear bilaterally, No wheezing, rales or rhonchi  Cardiovascular: S1 and S2, regular rate and rhythm, no murmur, rub or gallop.  Gastrointestinal: Bowel Sounds present, soft, nontender, nondistended, no guarding, no rebound, no mass.  Extremities: No peripheral edema  Vascular: 2+ peripheral pulses  Neurological: A/O x , no focal deficits  Musculoskeletal: 5/5 strength b/l upper and lower extremities  Skin:  no visible rashes.                         11.6   8.15  )-----------( 516      ( 30 Sep 2021 12:08 )             35.7       09-30    133<L>  |  101  |  5<L>  ----------------------------<  94  3.5   |  26  |  0.72    Ca    9.6      30 Sep 2021 12:08  Mg     2.1     09-30    TPro  7.3  /  Alb  3.3  /  TBili  0.4  /  DBili  x   /  AST  24  /  ALT  45  /  AlkPhos  73  09-30

## 2021-10-01 NOTE — PROGRESS NOTE ADULT - ASSESSMENT
disposition:  - patient was offered inpatient rehab to aggressively address his polysubstance abuse disorder.   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  - encouraged patient to follow up with outpatient rehab programs as described by CM.  - otherwise, patient will leave the hospital today and proceed back to living on the premiss of his sister's home.  - I further notified the patient that he will be leaving without opioid analgesics due to his high risk of relapsing.  - alternative pain management strategies reviewed.  - I did agree to Rx Xanax 0.25mg po tid x 3 days supply (iSTOP Ref #:  [id number]) as this seems to be helping him manage his psychosocial issues on the short term.  patient had been made aware of Xanax dependency potential.  - advised close follow up with his family physician in the community to further orchestrate his complex medical care.    communication.  - 5E RN manager.  - 5E RN.  - 5E CM.    Problem/Plan - 1:  ·  Problem: Alcohol abuse.   ·  Plan: completed CIWA protocol with ativan  Pt offered counseling and advised follow up with alcohol dependence program such as AA  case management to be consulted to offer programs inpatient vs outpatient for polysubstance abuse.    Problem/Plan - 2:  ·  Problem: Substance induced mood disorder.   ·  Plan: continue Wellbutrin  unclear if pain seeking or true cardiac chest pain as pt may not be  a reliable historian    Problem/Plan - 3:  ·  Problem: NSTEMI (non-ST elevation myocardial infarction).   ·  Plan: seen by cardiology and underwent LHC, +arteriosclerotic small D2 branch no intervention due to risk of pt non-compliance  plavix loaded continue daily dose plavix  continue ASA, statin  no betablocker in setting of cocaine positive tox  9/26 add nitrates as pt has ongoing chest pain, unclear if there is pain seeking behavior as pt with admitted history of opiate dependence.    Problem/Plan - 4:  ·  Problem: NSVT (nonsustained ventricular tachycardia).   ·  Plan: 2/2 to NSTEMI, no recent recurrence.    Problem/Plan - 5:  ·  Problem: Acute pancreatitis.   ·  Plan: secondary to alcohol abuse  prn dilaudid  c/o abd pain  check CMP , lipase;   would do CT abdo/ pelvis with po and iv contrast if renal function is normal  improving pancreatitis but annd pain present  GI consult requested; f/u    Problem/Plan - 6:  ·  Problem: Cocaine abuse.   ·  Plan: pt adamant that he doesn't regularly use cocaine but not clear if he is reliable in history  pt could benefit from rehab 2/2 multi substance abuse  case management referral as above.    DVT PPX: lovenox    Disposition: Gettin large amounts of IV opioids and benzos. Discontinued 9/29. Given the nature of 11 days of high dose administration and frequent IV opioids, likely to go into withdrawl if were to discontinued. DC all IV medications and start PO and down titrate. Unable to provide prescriptions at discharge in the setting of patient's known history and relapse of substance abuse and etOH abuse.  Continue to monitor. Heart and abdominal status stable.     9/30: Down titrating control substances more. Patient appears to be tolerating. Had extensive discussion with sister. States that she is will to supervise administration of benzo at home and I advised her that it is still a high risk situation and options are to follow up closely with outpatient rehab/psych or he volunatarily admits himself to inpatient rehab. Asked SW to reach out to sister to provider list of options and resources. Possible discharge in 1-2 days if patient is able to tolerating taper and discontinuation of control substances without any significant issues.

## 2021-10-01 NOTE — DISCHARGE NOTE NURSING/CASE MANAGEMENT/SOCIAL WORK - NSDCPEWEB_GEN_ALL_CORE
[facility] for Tobacco Control website --- [email or web address] website --- [email or web address]

## 2021-10-01 NOTE — DISCHARGE NOTE PROVIDER - NSDCCPCAREPLAN_GEN_ALL_CORE_FT
PRINCIPAL DISCHARGE DIAGNOSIS  Diagnosis: Alcoholic pancreatitis  Assessment and Plan of Treatment:       SECONDARY DISCHARGE DIAGNOSES  Diagnosis: NSTEMI (non-ST elevation myocardial infarction)  Assessment and Plan of Treatment:

## 2021-10-06 DIAGNOSIS — F11.20 OPIOID DEPENDENCE, UNCOMPLICATED: ICD-10-CM

## 2021-10-06 DIAGNOSIS — F17.290 NICOTINE DEPENDENCE, OTHER TOBACCO PRODUCT, UNCOMPLICATED: ICD-10-CM

## 2021-10-06 DIAGNOSIS — K85.20 ALCOHOL INDUCED ACUTE PANCREATITIS WITHOUT NECROSIS OR INFECTION: ICD-10-CM

## 2021-10-06 DIAGNOSIS — F32.9 MAJOR DEPRESSIVE DISORDER, SINGLE EPISODE, UNSPECIFIED: ICD-10-CM

## 2021-10-06 DIAGNOSIS — F12.20 CANNABIS DEPENDENCE, UNCOMPLICATED: ICD-10-CM

## 2021-10-06 DIAGNOSIS — F19.94 OTHER PSYCHOACTIVE SUBSTANCE USE, UNSPECIFIED WITH PSYCHOACTIVE SUBSTANCE-INDUCED MOOD DISORDER: ICD-10-CM

## 2021-10-06 DIAGNOSIS — F10.239 ALCOHOL DEPENDENCE WITH WITHDRAWAL, UNSPECIFIED: ICD-10-CM

## 2021-10-06 DIAGNOSIS — E44.0 MODERATE PROTEIN-CALORIE MALNUTRITION: ICD-10-CM

## 2021-10-06 DIAGNOSIS — F13.20 SEDATIVE, HYPNOTIC OR ANXIOLYTIC DEPENDENCE, UNCOMPLICATED: ICD-10-CM

## 2021-10-06 DIAGNOSIS — I47.2 VENTRICULAR TACHYCARDIA: ICD-10-CM

## 2021-10-06 DIAGNOSIS — I21.4 NON-ST ELEVATION (NSTEMI) MYOCARDIAL INFARCTION: ICD-10-CM

## 2021-10-06 DIAGNOSIS — D69.6 THROMBOCYTOPENIA, UNSPECIFIED: ICD-10-CM

## 2021-10-06 DIAGNOSIS — Z76.5 MALINGERER [CONSCIOUS SIMULATION]: ICD-10-CM

## 2021-10-06 DIAGNOSIS — I25.10 ATHEROSCLEROTIC HEART DISEASE OF NATIVE CORONARY ARTERY WITHOUT ANGINA PECTORIS: ICD-10-CM

## 2021-10-06 DIAGNOSIS — Z79.02 LONG TERM (CURRENT) USE OF ANTITHROMBOTICS/ANTIPLATELETS: ICD-10-CM

## 2023-06-12 NOTE — BEHAVIORAL HEALTH ASSESSMENT NOTE - HYGIENE
Fair Consent: The patient's consent was obtained including but not limited to risks of crusting, scabbing, blistering, scarring, darker or lighter pigmentary change, recurrence, incomplete removal and infection. Medical Necessity Information: It is in your best interest to select a reason for this procedure from the list below. All of these items fulfill various CMS LCD requirements except the new and changing color options. Show Topical Anesthesia Variable?: Yes Post-Care Instructions: I reviewed with the patient in detail post-care instructions. Patient is to wear sunprotection, and avoid picking at any of the treated lesions. Pt may apply Vaseline to crusted or scabbing areas. Add 52 Modifier (Optional): no Application Tool (Optional): Cry-AC Detail Level: Detailed Medical Necessity Clause: This procedure was medically necessary because the lesions that were treated were: Spray Paint Text: The liquid nitrogen was applied to the skin utilizing a spray paint frosting technique.
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
## 2024-02-19 NOTE — BEHAVIORAL HEALTH ASSESSMENT NOTE - NSBHREFERLPTEAMNAME_PSY_A_CORE_FT
Jailyn Espinosa Constitutional:  (-) fever, (-) chills, (-) lethargy  Eyes:  (-) eye pain (-) visual changes  ENMT: (-) nasal discharge, (-) sore throat. (-) neck pain or stiffness  Cardiac: (+) chest pain (-) palpitations  Respiratory:  (+) cough (+) respiratory distress.   GI:  (-) nausea (-) vomiting (-) diarrhea (-) abdominal pain.  :  (-) dysuria (-) frequency (-) burning.  MS:  (-) back pain (-) joint pain.  Neuro:  (-) headache (-) numbness (-) tingling (-) focal weakness  Skin:  (-) rash  Except as documented in the HPI,  all other systems are negative

## 2024-04-03 NOTE — ED ADULT NURSE NOTE - ISOLATION TYPE:
April 3, 2024       Shemar Dave MD  [address] [place] [id number]  Via Fax: [phone]      Patient: Unique Kebede   YOB: 1995   Date of Visit: 4/3/2024       Dear Dr. Dave:    I saw your patient, Unique Kebede, for an evaluation. Below are my notes for this visit with her.    If you have questions, please do not hesitate to call me.      Sincerely,        Becca Packer MD        CC: No Recipients    Becca Packer MD  4/3/2024 11:09 AM  Signed    24  RE: Unique Kebede  : 1995  GREGORY: 2024  Gest age: 29w2d   : Nicky        Dear Dr. Shemar Dave MD,    We had the pleasure of meeting Unique Kebede for a f/u MFM US/consultation. As you know, Unique Kebede is a 28 year old  at 29w2d IUP. We spent the majority of her visit discussing pregnancy management of the following issues:    MATERNAL:   Anemia  Epilepsy  Chronic hypertension  Obesity  Bipolar  Migraines     FETAL:   None      Patient reports: +fetal movement. No vaginal bleeding, vaginal discharge, loss of fluid, rupture of membranes, ha/vision changes/ruq pain, dysuria, uterine contractions, chest pain,shortness of breath.     No recent seizure - reports compliance with Keppra, reports no recent Keppra level    Reports home bp's wnl - no bp log avail for review    Review of Systems:  Constitutional: Denies fatigue, excessive weakness, fever, or chills  HEENT: Denies sore throat, visual changes, or sinus drainage  Cardiovascular: Denies chest pain or palpitations  Respiratory: Denies shortness of breath, cough, or wheezing  Breast: Denies pain, mass or nipple discharge  Gastrointestinal: Denies nausea, vomiting, diarrhea, or constipation  Female genitourinary: Denies abnormal vaginal discharge, irritation, burning, or malodor. Denies dysuria, frequency, or urgency  Musculoskeletal: Denies joint pain or swelling  Integumentary: Denies rash, erythema, or irritation  Neurologic: Denies headache  or dizziness  Psychiatric: Denies depression or suicidal or homicidal ideations  ROS is otherwise negative.         PAST OBSTETRICAL HISTORY:     - female, ft , epilepsy/chtn (keppra/lamictal), IOL at 37wks bc of worsening epilepsy, 5.11lbs w/o nicu admission   - sab at 12wks, no d&C   - sab at 15wks, , path - male fetus, placenta with mild/acute chorio  Current preg w/o IVF    PAST MEDICAL HISTORY: Epilepsy and CHTN - per pt, both were diagnosed at age 13yrs  Epilepsy: pt reports grand mal seizures and absence seizures, managed by neurology and aware of preg, reports last sz 6months ago, reports compliance with keppra  chtn - reports stable w/o antihypertensive  Anemia, obesity - bmi 38, migraines, bipolar was with psych last year - currently doing well w/o meds    MEDICATIONS: pnv's, Keppra      Visit Vitals  /83   Pulse 83   LMP 2023      EXAM:    General: No acute distress, a&ox3  HEENT: Within normal limits  Lungs: Nonlabored respirations  Skin: No rashes, skin warm and dry, no erythematous areas  Abdomen: Soft, gravid, no g/r, nontender, no fundal tenderness    Labs: see emr/pnr  23 - UDS +MJ, hgb 10.1, plt 341, O+, abs neg, placental path - mild/acute choro, male fetus ~14/15wks, hgb 9.6, plt 273, LAC neg, MCKAY neg, [id number] neg  23 - hgb 10.2, plt 471 high, bun/cr 10.0.4, ast/alt 25/11, levetiracetam level 40.4 wnl  10/16/23 - AT3 121 nl, Prot C 120 nl, Prot S nl, FVL neg, PGM neg,   10/24/23 - hgb 10.2, plt 346, bun/cr 9/0.57, ast/alt 17/18, O+  11/15/23 - CF neg, SMA neg, NIPT LR, Tsh 0.604, hgba1c 5.7%, o+, abs neg, hgb 10.5, plt 347  11/21/12 early 1hr 112  24 - levetiracetam level 43 high, afp1 neg,   24 - hgb 11.4, plt 298, bun/cr 7/0.43, ast/alt 24 - hgb electrophoresis - normal hgb present, hgb 10.1, plt 312, ur pcr 138  24 - mat EKG - NSR  3/12/24 - UDS neg, FFN neg,   3/23/24 - hgb 11.0 low, plt 318, 1hr 133      US: please  see separate report  Findings:  - live IUP  - vertex  - AGA fetus - efw 17%, ac 14%  - normal amniotic fluid  - anterior placenta  - bpp       Impression: 28 year old  at 29w2d IUP with Anemia, Epilepsy, Chronic hypertension, Obesity, Bipolar, Migraines  - fm/ptl/preeclamptic precautions provided to pt    - Anemia:  Reviewed risks, enc optimization of mat blood counts antenatally, enc inc po Fe, repeat cbc in 4wks and if hgb <10 would start IV Fe  Hgb low but improved - cont po Fe    - Epilepsy, on keppra, managed by Neurology:  Reviewed risks, cont comanagement with Neuro - cont keppra per their management (check levels and adjust med accordingly)    - Chronic hypertension, without antihypertensive:  Reviewed mat/fetal/nn risks of worsening htn vs superimposed preeclampsia  Cont home bp monitoring bid and prn - notify with elevated bp's/pih sx's  Reviewed USPSTF recs re: LDA - enc compliance   Reports home bp's wnl (<140/90) - no bp log avail for review    - Obesity, bmi 38:  Reviewed risks, enc healthy nutrition, exercise as tolerated, reviewed total goal wt gain 11-20lbs in preg     - Bipolar, stable w/o med tx, currently not managed by Psychiatry:  Reviewed risks, stable at this time w/o med, monitor pt's mood closely, low threshold to consult psych/start med tx as needed    - Migraines:  Reviewed risks, stable at this time      Recommendations:    - monitor for worsening chtn vs superimposed preeclampsia  - cont LDA until delivery   - enc optimization of mat blood counts antenatally, enc inc po Fe, repeat cbc in 4wks and if hgb <10 would start IV Fe  - home bp monitoring bid and prn, reviewed goal bp's <140/90, call P.OB ASAP with bp's >160/105 or pih symptoms  - monitor migraines - consult neuro as needed  - enc healthy nutrition, exercise as tolerated, reviewed total goal wt gain 11-20lbs   - P.OB to assess for TRAE, with concern consult pulm/sleep med  - continue comanagement with neuro - neuro to  check keppra levels monthly and adjust as needed  - folic acid supplementation  - check baseline mat echocardiogram (CHTN, longstanding)  - continue PO Fe  - monitor pt's mood closely antepartum and postpartum periods, low threshold to consult psych/start med tx as needed  - repeat fetal growth US with bpp scheduled in 4wks, serial thereafter  - weekly bpp scheduled from 32wks until delivery   - weekly nst (ideally 3-4 days separate from MFM BPP) from 32wks until delivery - P.Ob to schedule  - ACOG recs delivery for isolated chtn w/o antihypertensive at 38.0-39.6wks, sooner if obstetrically indicated  - At delivery, neonates should receive Vitamin K 1mg IM at birth   - with concern for TRAE, rec continuous pulse oximetry monitoring for 24 hours postpartum if patient receives narcotics for pain control  - after delivery enc diet, exercise, wt loss, behavior modification to reduce bmi      All questions answered for pt - pt expresses understanding and is in agreement with plan.    Thank you for allowing me to partake in this patient's care.   Please do not hesitate to contact me if I can be of further assistance.     Sincerely,     Becca Packer MD      Note to patient: The 21st Century Cures Act makes medical notes like these available to patients in the interest of transparency. However, be advised this is a medical document. It is intended as peer to peer communication. It is written in medical language and may contain abbreviations or verbiage that are unfamiliar. It may appear blunt or direct. Medical documents are intended to carry relevant information, facts as evident, and the clinical opinion of the practitioner.   None

## 2024-07-26 NOTE — BEHAVIORAL HEALTH ASSESSMENT NOTE - NSBHREFER_PSY_A_CORE
If your child received fluoride varnish today, here are some general guidelines for the rest of the day.    Your child can eat and drink right away after varnish is applied but should AVOID hot liquids or sticky/crunchy foods for 24 hours.    Don't brush or floss your teeth for the next 4-6 hours and resume regular brushing, flossing and dental checkups after this initial time period.    Patient Education    [employer] HANDOUT- PARENT  2 YEAR VISIT  Here are some suggestions from [employer] experts that may be of value to your family.     HOW YOUR FAMILY IS DOING  Take time for yourself and your partner.  Stay in touch with friends.  Make time for family activities. Spend time with each child.  Teach your child not to hit, bite, or hurt other people. Be a role model.  If you feel unsafe in your home or have been hurt by someone, let us know. Hotlines and community resources can also provide confidential help.  Don t smoke or use e-cigarettes. Keep your home and car smoke-free. Tobacco-free spaces keep children healthy.  Don t use alcohol or drugs.  Accept help from family and friends.  If you are worried about your living or food situation, reach out for help. Community agencies and programs such as WIC and SNAP can provide information and assistance.    YOUR CHILD S BEHAVIOR  Praise your child when he does what you ask him to do.  Listen to and respect your child. Expect others to as well.  Help your child talk about his feelings.  Watch how he responds to new people or situations.  Read, talk, sing, and explore together. These activities are the best ways to help toddlers learn.  Limit TV, tablet, or smartphone use to no more than 1 hour of high-quality programs each day.  It is better for toddlers to play than to watch TV.  Encourage your child to play for up to 60 minutes a day.  Avoid TV during meals. Talk together instead.    TALKING AND YOUR CHILD  Use clear, simple language with your child. Don t use  baby talk.  Talk slowly and remember that it may take a while for your child to respond. Your child should be able to follow simple instructions.  Read to your child every day. Your child may love hearing the same story over and over.  Talk about and describe pictures in books.  Talk about the things you see and hear when you are together.  Ask your child to point to things as you read.  Stop a story to let your child make an animal sound or finish a part of the story.    TOILET TRAINING  Begin toilet training when your child is ready. Signs of being ready for toilet training include  Staying dry for 2 hours  Knowing if she is wet or dry  Can pull pants down and up  Wanting to learn  Can tell you if she is going to have a bowel movement  Plan for toilet breaks often. Children use the toilet as many as 10 times each day.  Teach your child to wash her hands after using the toilet.  Clean potty-chairs after every use.  Take the child to choose underwear when she feels ready to do so.    SAFETY  Make sure your child s car safety seat is rear facing until he reaches the highest weight or height allowed by the car safety seat s . Once your child reaches these limits, it is time to switch the seat to the forward- facing position.  Make sure the car safety seat is installed correctly in the back seat. The harness straps should be snug against your child s chest.  Children watch what you do. Everyone should wear a lap and shoulder seat belt in the car.  Never leave your child alone in your home or yard, especially near cars or machinery, without a responsible adult in charge.  When backing out of the garage or driving in the driveway, have another adult hold your child a safe distance away so he is not in the path of your car.  Have your child wear a helmet that fits properly when riding bikes and trikes.  If it is necessary to keep a gun in your home, store it unloaded and locked with the ammunition locked  separately.    WHAT TO EXPECT AT YOUR CHILD S 2  YEAR VISIT  We will talk about  Creating family routines  Supporting your talking child  Getting along with other children  Getting ready for   Keeping your child safe at home, outside, and in the car        Helpful Resources: National Domestic Violence Hotline: 970.194.1421  Poison Help Line:  544.796.5630  inpatient Medical/Surgical team

## 2025-03-21 ENCOUNTER — EMERGENCY (EMERGENCY)
Facility: HOSPITAL | Age: 60
LOS: 0 days | Discharge: HOME | End: 2025-03-21
Attending: STUDENT IN AN ORGANIZED HEALTH CARE EDUCATION/TRAINING PROGRAM
Payer: MEDICARE

## 2025-03-21 VITALS
TEMPERATURE: 98 F | DIASTOLIC BLOOD PRESSURE: 97 MMHG | RESPIRATION RATE: 17 BRPM | OXYGEN SATURATION: 100 % | HEART RATE: 65 BPM | SYSTOLIC BLOOD PRESSURE: 170 MMHG | WEIGHT: 166.23 LBS

## 2025-03-21 VITALS
DIASTOLIC BLOOD PRESSURE: 76 MMHG | OXYGEN SATURATION: 100 % | RESPIRATION RATE: 18 BRPM | SYSTOLIC BLOOD PRESSURE: 140 MMHG | TEMPERATURE: 98 F | HEART RATE: 72 BPM

## 2025-03-21 DIAGNOSIS — R07.89 OTHER CHEST PAIN: ICD-10-CM

## 2025-03-21 DIAGNOSIS — R79.1 ABNORMAL COAGULATION PROFILE: ICD-10-CM

## 2025-03-21 LAB
ALBUMIN SERPL ELPH-MCNC: 3.9 G/DL — SIGNIFICANT CHANGE UP (ref 3.3–5)
ALP SERPL-CCNC: 85 U/L — SIGNIFICANT CHANGE UP (ref 40–120)
ALT FLD-CCNC: 18 U/L — SIGNIFICANT CHANGE UP (ref 12–78)
AMPHET UR-MCNC: NEGATIVE — SIGNIFICANT CHANGE UP
ANION GAP SERPL CALC-SCNC: 4 MMOL/L — LOW (ref 5–17)
APTT BLD: 54.2 SEC — HIGH (ref 24.5–35.6)
AST SERPL-CCNC: 42 U/L — HIGH (ref 15–37)
BARBITURATES UR SCN-MCNC: NEGATIVE — SIGNIFICANT CHANGE UP
BASOPHILS # BLD AUTO: 0.06 K/UL — SIGNIFICANT CHANGE UP (ref 0–0.2)
BASOPHILS NFR BLD AUTO: 0.7 % — SIGNIFICANT CHANGE UP (ref 0–2)
BENZODIAZ UR-MCNC: POSITIVE — SIGNIFICANT CHANGE UP
BILIRUB SERPL-MCNC: 0.7 MG/DL — SIGNIFICANT CHANGE UP (ref 0.2–1.2)
BUN SERPL-MCNC: 13 MG/DL — SIGNIFICANT CHANGE UP (ref 7–23)
CALCIUM SERPL-MCNC: 8.9 MG/DL — SIGNIFICANT CHANGE UP (ref 8.5–10.1)
CHLORIDE SERPL-SCNC: 98 MMOL/L — SIGNIFICANT CHANGE UP (ref 96–108)
CO2 SERPL-SCNC: 27 MMOL/L — SIGNIFICANT CHANGE UP (ref 22–31)
COCAINE METAB.OTHER UR-MCNC: NEGATIVE — SIGNIFICANT CHANGE UP
CREAT SERPL-MCNC: 1.07 MG/DL — SIGNIFICANT CHANGE UP (ref 0.5–1.3)
D DIMER BLD IA.RAPID-MCNC: 291 NG/ML DDU — HIGH
EGFR: 80 ML/MIN/1.73M2 — SIGNIFICANT CHANGE UP
EGFR: 80 ML/MIN/1.73M2 — SIGNIFICANT CHANGE UP
EOSINOPHIL # BLD AUTO: 0.23 K/UL — SIGNIFICANT CHANGE UP (ref 0–0.5)
EOSINOPHIL NFR BLD AUTO: 2.8 % — SIGNIFICANT CHANGE UP (ref 0–6)
ETHANOL SERPL-MCNC: <10 MG/DL — SIGNIFICANT CHANGE UP (ref 0–10)
FENTANYL UR QL SCN: NEGATIVE — SIGNIFICANT CHANGE UP
GLUCOSE SERPL-MCNC: 96 MG/DL — SIGNIFICANT CHANGE UP (ref 70–99)
HCT VFR BLD CALC: 39.6 % — SIGNIFICANT CHANGE UP (ref 39–50)
HGB BLD-MCNC: 12.7 G/DL — LOW (ref 13–17)
IMM GRANULOCYTES # BLD AUTO: 0.04 K/UL — SIGNIFICANT CHANGE UP (ref 0–0.07)
IMM GRANULOCYTES NFR BLD AUTO: 0.5 % — SIGNIFICANT CHANGE UP (ref 0–0.9)
INR BLD: 1.04 RATIO — SIGNIFICANT CHANGE UP (ref 0.85–1.16)
LYMPHOCYTES # BLD AUTO: 1.5 K/UL — SIGNIFICANT CHANGE UP (ref 1–3.3)
LYMPHOCYTES NFR BLD AUTO: 18.3 % — SIGNIFICANT CHANGE UP (ref 13–44)
MAGNESIUM SERPL-MCNC: 2 MG/DL — SIGNIFICANT CHANGE UP (ref 1.6–2.6)
MCHC RBC-ENTMCNC: 27.7 PG — SIGNIFICANT CHANGE UP (ref 27–34)
MCHC RBC-ENTMCNC: 32.1 G/DL — SIGNIFICANT CHANGE UP (ref 32–36)
MCV RBC AUTO: 86.5 FL — SIGNIFICANT CHANGE UP (ref 80–100)
METHADONE UR-MCNC: NEGATIVE — SIGNIFICANT CHANGE UP
MONOCYTES # BLD AUTO: 0.64 K/UL — SIGNIFICANT CHANGE UP (ref 0–0.9)
MONOCYTES NFR BLD AUTO: 7.8 % — SIGNIFICANT CHANGE UP (ref 2–14)
NEUTROPHILS # BLD AUTO: 5.73 K/UL — SIGNIFICANT CHANGE UP (ref 1.8–7.4)
NEUTROPHILS NFR BLD AUTO: 69.9 % — SIGNIFICANT CHANGE UP (ref 43–77)
NRBC # BLD AUTO: 0 K/UL — SIGNIFICANT CHANGE UP (ref 0–0)
NRBC # FLD: 0 K/UL — SIGNIFICANT CHANGE UP (ref 0–0)
NRBC BLD AUTO-RTO: 0 /100 WBCS — SIGNIFICANT CHANGE UP (ref 0–0)
NT-PROBNP SERPL-SCNC: 105 PG/ML — SIGNIFICANT CHANGE UP (ref 0–125)
OPIATES UR-MCNC: NEGATIVE — SIGNIFICANT CHANGE UP
PCP SPEC-MCNC: SIGNIFICANT CHANGE UP
PCP UR-MCNC: NEGATIVE — SIGNIFICANT CHANGE UP
PLATELET # BLD AUTO: 273 K/UL — SIGNIFICANT CHANGE UP (ref 150–400)
PMV BLD: 9.4 FL — SIGNIFICANT CHANGE UP (ref 7–13)
POTASSIUM SERPL-MCNC: 4.1 MMOL/L — SIGNIFICANT CHANGE UP (ref 3.5–5.3)
POTASSIUM SERPL-SCNC: 4.1 MMOL/L — SIGNIFICANT CHANGE UP (ref 3.5–5.3)
PROT SERPL-MCNC: 8.1 GM/DL — SIGNIFICANT CHANGE UP (ref 6–8.3)
PROTHROM AB SERPL-ACNC: 12 SEC — SIGNIFICANT CHANGE UP (ref 9.9–13.4)
RBC # BLD: 4.58 M/UL — SIGNIFICANT CHANGE UP (ref 4.2–5.8)
RBC # FLD: 14.2 % — SIGNIFICANT CHANGE UP (ref 10.3–14.5)
SODIUM SERPL-SCNC: 129 MMOL/L — LOW (ref 135–145)
THC UR QL: NEGATIVE — SIGNIFICANT CHANGE UP
TROPONIN I, HIGH SENSITIVITY RESULT: 5.1 NG/L — SIGNIFICANT CHANGE UP
WBC # BLD: 8.2 K/UL — SIGNIFICANT CHANGE UP (ref 3.8–10.5)
WBC # FLD AUTO: 8.2 K/UL — SIGNIFICANT CHANGE UP (ref 3.8–10.5)

## 2025-03-21 PROCEDURE — 99285 EMERGENCY DEPT VISIT HI MDM: CPT | Mod: 25

## 2025-03-21 PROCEDURE — 71275 CT ANGIOGRAPHY CHEST: CPT

## 2025-03-21 PROCEDURE — 93010 ELECTROCARDIOGRAM REPORT: CPT

## 2025-03-21 PROCEDURE — 85025 COMPLETE CBC W/AUTO DIFF WBC: CPT

## 2025-03-21 PROCEDURE — 83880 ASSAY OF NATRIURETIC PEPTIDE: CPT

## 2025-03-21 PROCEDURE — 80307 DRUG TEST PRSMV CHEM ANLYZR: CPT

## 2025-03-21 PROCEDURE — 71045 X-RAY EXAM CHEST 1 VIEW: CPT | Mod: 26

## 2025-03-21 PROCEDURE — 99285 EMERGENCY DEPT VISIT HI MDM: CPT

## 2025-03-21 PROCEDURE — 80053 COMPREHEN METABOLIC PANEL: CPT

## 2025-03-21 PROCEDURE — 36415 COLL VENOUS BLD VENIPUNCTURE: CPT

## 2025-03-21 PROCEDURE — 85379 FIBRIN DEGRADATION QUANT: CPT

## 2025-03-21 PROCEDURE — 83735 ASSAY OF MAGNESIUM: CPT

## 2025-03-21 PROCEDURE — 85610 PROTHROMBIN TIME: CPT

## 2025-03-21 PROCEDURE — 93005 ELECTROCARDIOGRAM TRACING: CPT

## 2025-03-21 PROCEDURE — 71045 X-RAY EXAM CHEST 1 VIEW: CPT

## 2025-03-21 PROCEDURE — 85730 THROMBOPLASTIN TIME PARTIAL: CPT

## 2025-03-21 PROCEDURE — 71275 CT ANGIOGRAPHY CHEST: CPT | Mod: 26

## 2025-03-21 PROCEDURE — 84484 ASSAY OF TROPONIN QUANT: CPT

## 2025-03-21 PROCEDURE — 36000 PLACE NEEDLE IN VEIN: CPT | Mod: XU

## 2025-03-21 RX ORDER — ASPIRIN 325 MG
324 TABLET ORAL ONCE
Refills: 0 | Status: COMPLETED | OUTPATIENT
Start: 2025-03-21 | End: 2025-03-21

## 2025-03-21 RX ADMIN — Medication 324 MILLIGRAM(S): at 20:51

## 2025-03-21 NOTE — ED PROVIDER NOTE - PATIENT PORTAL LINK FT
You can access the FollowMyHealth Patient Portal offered by [facility] by registering at the following website: [email or web address]. By joining [employer]’s FollowMyHealth portal, you will also be able to view your health information using other applications (apps) compatible with our system.

## 2025-03-21 NOTE — ED ADULT NURSE NOTE - NSFALLHARMRISKINTERV_ED_ALL_ED
Assistance OOB with selected safe patient handling equipment if applicable/Assistance with ambulation/Communicate risk of Fall with Harm to all staff, patient, and family/Monitor gait and stability/Monitor for mental status changes and reorient to person, place, and time, as needed/Move patient closer to nursing station/within visual sight of ED staff/Provide visual cue: red socks, yellow wristband, yellow gown, etc/Reinforce activity limits and safety measures with patient and family/Toileting schedule using arm’s reach rule for commode and bathroom/Use of alarms - bed, stretcher, chair and/or video monitoring/Bed in lowest position, wheels locked, appropriate side rails in place/Call bell, personal items and telephone in reach/Instruct patient to call for assistance before getting out of bed/chair/stretcher/Non-slip footwear applied when patient is off stretcher/[place] to call system/Physically safe environment - no spills, clutter or unnecessary equipment/Purposeful Proactive Rounding/Room/bathroom lighting operational, light cord in reach
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
## 2025-03-21 NOTE — ED PROVIDER NOTE - OBJECTIVE STATEMENT
59-year-old male PMH opioid dependence, alcohol abuse, pancreatitis, presents to the emergency department for chest pain.  Patient states pain started last night, was taking Advil without any relief.  Today, was arrested by [employer] and complained of persistent symptoms.  Pain is isolated to the right side of the chest, nonradiating.  Endorses occasional nonproductive cough.  He denies shortness of breath, diaphoresis, or nausea/vomiting.  Had a history of chest pain previously.  Upon chart review, patient was admitted in September 2021 for NSTEMI possibly secondary to cocaine use.

## 2025-03-21 NOTE — ED ADULT TRIAGE NOTE - CHIEF COMPLAINT QUOTE
Patient presents to the ER with [employer] [id number] with complaints of chest pain radiating to shoulder and occasional cough. Denies N/V, syncope, shortness of breath, dizziness. Patient currently under arrest.

## 2025-03-21 NOTE — ED PROVIDER NOTE - CLINICAL SUMMARY MEDICAL DECISION MAKING FREE TEXT BOX
59-year-old male presenting with right-sided chest pain, currently incarcerated by [employer].  EKG with left axis deviation, no ST changes.  Chest pain history appears atypical, does have some risk factors.  Plan for cardiac eval including cardiac monitoring, serial troponins, chest x-ray, aspirin load, reassess for dispo.

## 2025-03-21 NOTE — ED ADULT NURSE NOTE - CHIEF COMPLAINT QUOTE
Patient presents to the ER with [employer] [id number] with complaints of chest pain radiating to shoulder and occasional cough. Denies N/V, syncope, shortness of breath, dizziness. Patient currently under arrest.

## 2025-03-21 NOTE — ED PROVIDER NOTE - HIV OFFER
Opt out 77 yo M, hx of seizures, presents with altered mental status which occurred PTA witnessed by family.  Patient also c/o diffuse HA.  Per family, patient was slow to speak and confused.  Notes speech was clear.  Family also states patient has been less physically active since yesterday.  Denies cough, CP, SOB, fevers.  Patient reports taking abx for right elbow cellulitis after a biopsy several months ago.  States the abx was changed 2 weeks ago because it was not effective.

## 2025-03-21 NOTE — ED PROVIDER NOTE - NSFOLLOWUPINSTRUCTIONS_ED_ALL_ED_FT
Patient is medically stable for discharge or incarceration.    -------------    Chest Pain    Chest pain can be caused by many different conditions which may or may not be dangerous. Causes include heartburn, lung infections, heart attack, blood clot in lungs, skin infections, strain or damage to muscle, cartilage, or bones, etc. In addition to a history and physical examination, an electrocardiogram (ECG) or other lab tests may have been performed to determine the cause of your chest pain. Follow up with your primary care provider or with a cardiologist as instructed.     SEEK IMMEDIATE MEDICAL CARE IF YOU HAVE ANY OF THE FOLLOWING SYMPTOMS: worsening chest pain, coughing up blood, unexplained back/neck/jaw pain, severe abdominal pain, dizziness or lightheadedness, fainting, shortness of breath, sweaty or clammy skin, vomiting, or racing heart beat. These symptoms may represent a serious problem that is an emergency. Do not wait to see if the symptoms will go away. Get medical help right away. Call 911 and do not drive yourself to the hospital.

## 2025-03-21 NOTE — ED PROVIDER NOTE - PROGRESS NOTE DETAILS
All labs and imaging personally reviewed.  CTA performed due to elevated D-dimer, no PE.  There are few ill-defined opacities in the right upper lobe, patient without pneumonia type symptoms, WBC normal.  Will advise patient of results and observe.  Discussed return precautions and all questions answered. Pt in agreement w/ plan. Patient demonstrates decision making capacity, NAD, VSS. Stable for d/c.

## 2025-03-21 NOTE — ED PROVIDER NOTE - PHYSICAL EXAMINATION
GENERAL: A&Ox4, non-toxic appearing, no acute distress  HEENT: NCAT, EOMI, oral mucosa moist, normal conjunctiva  RESP: no respiratory distress, CTAB, no wheezes/rhonchi/rales, speaking in full sentences  CV: RRR, no murmurs/rubs/gallops, no chest wall tenderness  ABDOMEN: soft, non-tender, non-distended, no guarding, no rebound tenderness  MSK: no visible deformities  NEURO: no focal sensory or motor deficits, CN 2-12 grossly intact  SKIN: warm, normal color, well perfused, no rash  PSYCH: normal affect

## 2025-03-22 PROBLEM — K85.90 ACUTE PANCREATITIS WITHOUT NECROSIS OR INFECTION, UNSPECIFIED: Chronic | Status: ACTIVE | Noted: 2021-09-18

## 2025-03-22 PROBLEM — F11.20 OPIOID DEPENDENCE, UNCOMPLICATED: Chronic | Status: ACTIVE | Noted: 2021-09-18
